# Patient Record
Sex: FEMALE | Race: BLACK OR AFRICAN AMERICAN | Employment: FULL TIME | URBAN - METROPOLITAN AREA
[De-identification: names, ages, dates, MRNs, and addresses within clinical notes are randomized per-mention and may not be internally consistent; named-entity substitution may affect disease eponyms.]

---

## 2017-02-27 ENCOUNTER — TELEPHONE (OUTPATIENT)
Dept: INTERNAL MEDICINE CLINIC | Age: 47
End: 2017-02-27

## 2017-02-27 DIAGNOSIS — S90.32XA CONTUSION OF LEFT FOOT, INITIAL ENCOUNTER: Primary | ICD-10-CM

## 2017-02-27 NOTE — TELEPHONE ENCOUNTER
Pt believes she has a broken toe and needs to know how to treat or should she be seen? Asking for call back today?

## 2017-02-27 NOTE — TELEPHONE ENCOUNTER
Called and spoke with pt, who stated she stubbed her 4th toe on left foot in her house and heard a crack. Pt advised to visit urgent care if pain becomes intolerable, it being close to office losing time and MD in a room with a pt; however she was also advised to elevate foot and apply ice for no more than 20 min at a time. Will relay info to Dr. Michael Bay and have pt call in the morning to update and see what tx/imaging  Dr. Michael Bay deems necessary.

## 2017-02-27 NOTE — TELEPHONE ENCOUNTER
Please advise patient an xray was ordered today. She can go to the office across the [place] for the xray.

## 2017-02-28 DIAGNOSIS — S90.122A CONTUSION OF FOOT INCLUDING TOES, LEFT, INITIAL ENCOUNTER: Primary | ICD-10-CM

## 2017-02-28 DIAGNOSIS — S90.32XA CONTUSION OF FOOT INCLUDING TOES, LEFT, INITIAL ENCOUNTER: Primary | ICD-10-CM

## 2017-02-28 NOTE — TELEPHONE ENCOUNTER
Called and spoke with patient informing an xray was ordered today. She can go to the office across the [place] for the xray per Dr. Jayson Klein; pt verbalized understanding and stated she will do this today.

## 2017-03-03 ENCOUNTER — OFFICE VISIT (OUTPATIENT)
Dept: INTERNAL MEDICINE CLINIC | Age: 47
End: 2017-03-03

## 2017-03-03 VITALS
OXYGEN SATURATION: 99 % | DIASTOLIC BLOOD PRESSURE: 60 MMHG | SYSTOLIC BLOOD PRESSURE: 102 MMHG | BODY MASS INDEX: 25.43 KG/M2 | HEIGHT: 65 IN | WEIGHT: 152.6 LBS | HEART RATE: 59 BPM | TEMPERATURE: 97.8 F

## 2017-03-03 DIAGNOSIS — M25.511 RIGHT SHOULDER PAIN, UNSPECIFIED CHRONICITY: Primary | ICD-10-CM

## 2017-03-03 NOTE — PROGRESS NOTES
HISTORY OF PRESENT ILLNESS  Radha Schmid is a 40 y.o. female. 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  ondansetron (ZOFRAN ODT) 4 mg disintegrating tablet Take 1 Tab by mouth every eight (8) hours as needed for Nausea. 10 Tab 0     No Known Allergies        ROS    Physical Exam   Constitutional: She appears well-developed and well-nourished. Appears stated age   Cardiovascular: Normal rate, regular rhythm, normal heart sounds and intact distal pulses. Exam reveals no gallop and no friction rub. No murmur heard. Pulmonary/Chest: Effort normal and breath sounds normal. No respiratory distress. She has no wheezes. She has no rales. She exhibits no tenderness. Abdominal: Soft. Bowel sounds are normal.   Musculoskeletal: She exhibits no edema. TTP right anterior shoulder. Pain with right shoulder abduction to 90 degress  Normal shoulder girdle strength b/l   Neurological: She is alert. Psychiatric: She has a normal mood and affect. Nursing note and vitals reviewed. ASSESSMENT and PLAN  Elin Hurst was seen today for shoulder pain. Diagnoses and all orders for this visit:    Right shoulder pain, unspecified chronicity  -     REFERRAL TO ORTHOPEDICS  -     REFERRAL TO ORTHOPEDIC SURGERY   C/w bursitis/tendonitis--take otc nsaids, rest ,ice   Needs to see ortho MD for 3 months of pain--r/o tear   Stop exercises until sees ortho MD      Follow-up Disposition:  Return if symptoms worsen or fail to improve.

## 2017-03-03 NOTE — MR AVS SNAPSHOT
Visit Information Date & Time Provider Department Dept. Phone Encounter #  
 3/3/2017 11:30 AM Fletcher Arroyo, [address] [facility] [address] [phone] [id number] Follow-up Instructions Return if symptoms worsen or fail to improve. Upcoming Health Maintenance Date Due DTaP/Tdap/Td series (1 - Tdap) 9/19/1992 INFLUENZA AGE 9 TO ADULT 8/1/2016 PAP AKA CERVICAL CYTOLOGY 5/18/2019 Allergies as of 3/3/2017  Review Complete On: 3/3/2017 By: Jacquelyn Arrington LPN No Known Allergies Current Immunizations  Never Reviewed No immunizations on file. Not reviewed this visit You Were Diagnosed With   
  
 Codes Comments Right shoulder pain, unspecified chronicity    -  Primary ICD-10-CM: M25.511 ICD-9-CM: 719.41 Vitals BP  
  
  
  
  
  
 102/60 (BP 1 Location: Left arm, BP Patient Position: Sitting) BMI and BSA Data Body Mass Index Body Surface Area  
 25.39 kg/m 2 1.78 m 2 Preferred Pharmacy Pharmacy Name Phone [facility] N [address]. [phone] Your Updated Medication List  
  
   
This list is accurate as of: 3/3/17 12:06 PM.  Always use your most recent med list.  
  
  
  
  
 ondansetron 4 mg disintegrating tablet Commonly known as:  ZOFRAN ODT Take 1 Tab by mouth every eight (8) hours as needed for Nausea. TOPICORT 0.25 % ointment Generic drug:  desoximetasone Apply  to affected area two (2) times a day. We Performed the Following REFERRAL TO ORTHOPEDIC SURGERY [REF62 Custom] Comments:  
 Please evaluate patient for shoulder pain REFERRAL TO ORTHOPEDICS [YBZ310 Custom] Comments:  
 Please evaluate patient for right shoulder injury Follow-up Instructions Return if symptoms worsen or fail to improve. Referral Information Referral ID Referred By Referred To  
  
 [id number] [place] FERRARA [address] [facility] [address] Phone: [phone] Visits Status Start Date End Date 1 New Request 3/3/17 3/3/18 If your referral has a status of pending review or denied, additional information will be sent to support the outcome of this decision. Referral ID Referred By Referred To  
 [id number] Nimesh FERR[facility] [id number], [address] [facility] Visits Status Start Date End Date 1 New Request 3/3/17 3/3/18 If your referral has a status of pending review or denied, additional information will be sent to support the outcome of this decision. Introducing [facility]! Dear [facility]: 
Thank you for requesting a Neiron account. Our records indicate that you already have an active Neiron account. You can access your account anytime at [email or web address] Did you know that you can access your hospital and ER discharge instructions at any time in Neiron? You can also review all of your test results from your hospital stay or ER visit. Additional Information If you have questions, please visit the Frequently Asked Questions section of the Neiron website at [email or web address] Remember, Neiron is NOT to be used for urgent needs. For medical emergencies, dial 911. Now available from your iPhone and Android! Please provide this summary of care documentation to your next provider. Your primary care clinician is listed as Gulshan Lr. If you have any questions after today's visit, please call [phone].

## 2017-03-13 ENCOUNTER — HOSPITAL ENCOUNTER (OUTPATIENT)
Dept: PHYSICAL THERAPY | Age: 47
Discharge: HOME | End: 2017-03-13
Payer: COMMERCIAL

## 2017-03-13 PROCEDURE — 97161 PT EVAL LOW COMPLEX 20 MIN: CPT | Performed by: PHYSICAL THERAPIST

## 2017-03-13 PROCEDURE — 97110 THERAPEUTIC EXERCISES: CPT | Performed by: PHYSICAL THERAPIST

## 2017-03-13 NOTE — PROGRESS NOTES
[facility]  [address] (MOB IV), [address] [facility] [address]  [place],  [address]  Phone: [phone] Fax: [phone]    Plan of Care/Statement of Necessity for Physical Therapy Services  2-15    Patient name: Binu Pinedo  :   Provider#: [id number]  Referral source: FRANCA Terrazas      Medical/Treatment Diagnosis: M25.511  M75.41     Prior Hospitalization: see medical history     Comorbidities: HA  Prior Level of Function: 20 min. Of exercise at least 3x/wk  Medications: Verified on Patient Summary List    Start of Care: 3/13/17      Onset Date: chronic       The Plan of Care and following information is based on the information from the initial evaluation. Assessment/ key information: The patient presents with right shoulder pain consistent with recent x-rays that were positive for a bone spur (per patient report) causing shoulder impingement with pushing and overhead activities. 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 She does have pain and weakness with rotator cuff activation and her impingement is exacerbated by tight pec musculature, causing scapular dyskinesia particularly with overhead movement. The patient will benefit from postural education, rotator cuff and scapular musculature strengthening in order to return to an active lifestyle. The patient has been thoroughly educated on no pushing or overhead activities for now, which she understood.     Evaluation Complexity History LOW Complexity : Zero comorbidities / personal factors that will impact the outcome / POC; Examination MEDIUM Complexity : 3 Standardized tests and measures addressing body structure, function, activity limitation and / or participation in recreation  ;Presentation LOW Complexity : Stable, uncomplicated  ;Clinical Decision Making MEDIUM Complexity : FOTO score of 26-74  Overall Complexity Rating: LOW     Problem List: pain affecting function, decrease ROM, decrease strength, edema affecting function, decrease ADL/ functional abilitiies, decrease activity tolerance, decrease flexibility/ joint mobility and decrease transfer abilities   Treatment Plan may include any combination of the following: Therapeutic exercise, Therapeutic activities, Neuromuscular re-education, Physical agent/modality, Manual therapy and Patient education  Patient / Family readiness to learn indicated by: asking questions, trying to perform skills and interest  Persons(s) to be included in education: patient (P)  Barriers to Learning/Limitations: None  Patient Goal (s): to get back to same level of physical activity  Patient Self Reported Health Status: excellent  Rehabilitation Potential: good    Short Term Goals: To be accomplished in 2 treatments:   1.) The patient will be independent with her HEP. Long Term Goals: To be accomplished in 16 treatments:   1.) The patient will have improved shoulder abduction to at least 150 degrees with at most 1/10 pain in order to reach overhead.   2.) The patient will be able to perform typical gym activities with at most 1/10 pain. 3.) The patient will be able to sleep an entire night without waking up from pain. Frequency / Duration: Patient to be seen 2 times per week for 16 treatments. Patient/ Caregiver education and instruction: self care, activity modification and exercises    [x]  Plan of care has been reviewed with RUSS Gleason, PT 3/13/2017 7:26 PM    ________________________________________________________________________    I certify that the above Therapy Services are being furnished while the patient is under my care. I agree with the treatment plan and certify that this therapy is necessary.     [de-identified] Signature:____________________  Date:____________Time: _________

## 2017-03-13 NOTE — PROGRESS NOTES
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 It also hurts running 3-4 miles. Recent X-rays show a bone spur. She mainly has difficulty doing pushups and repetitive overhead movement. She can't sleep on her right side. She had a cortisone shot Wednesday of last week and felt great until reaching for basketball. PLOF: very active; 20min.  Of exercise at least 3x/wk  Mechanism of Injury: chronic  Previous Treatment/Compliance: cortisone injection  PMHx/Surgical Hx: n/a  Work Hx: marketing  Living Situation: lives with family  Pt Goals: to get back to same level of activity  Barriers: pain  Motivation: good  Substance use: n/a   FABQ Score: 18/24  Cognition: A & O x 3        OBJECTIVE/EXAMINATION  Posture:  Scapular protraction bilaterally  Other Observations:  Significant pain with abduction versus flexion  Palpation: tender to palpate right pec and lat    A/PROM Shoulder ROM:  Right   Left   Flexion   160   160   Extension  nt   nt   Abduction  140, p!   155   Adduction  nt   nt   IR   Hand to t10, p!/85 t10/95   ER   Hand to nt/80  nt/95    Joint Mobility Assessment: Glenohumeral: hypomobile on right          Flexibility: tight right pec and lats    UPPER QUARTER   MUSCLE STRENGTH  KEY       R  L  0 - No Contraction   Flexion  4, p!  5  1 - Trace    Extension nt  nt  2 - Poor    Abduction 4, p!  5  3 - Fair     IR  4+  5  4 - Good    ER  4-, p!  5  5 - Normal       Neurological: Reflexes / Sensations: WFL  Special Tests: Neer Impingement: + on right  Helton-Rufino: + on right     Shoulder Abduction: +        30 min Therapeutic Exercise:  [x] See flow sheet :   Rationale: increase ROM, increase strength and improve coordination to improve the patients ability to perform daily activities.           With   [x] TE   [] TA   [] neuro   [] other: Patient Education: [x] Review HEP    [x] Progressed/Changed HEP based on:   [x] positioning   [x] body mechanics   [] transfers   [] heat/ice application    [] other:        Other Objective/Functional Measures: FOTO= 54    Pain Level (0-10 scale) post treatment: 3      ASSESSMENT:      [x]  See Plan of [address] Stephon Pettit, PT 3/13/2017  3:41 PM

## 2017-03-16 ENCOUNTER — HOSPITAL ENCOUNTER (OUTPATIENT)
Dept: PHYSICAL THERAPY | Age: 47
Discharge: HOME | End: 2017-03-16
Payer: COMMERCIAL

## 2017-03-16 PROCEDURE — 97014 ELECTRIC STIMULATION THERAPY: CPT | Performed by: PHYSICAL THERAPIST

## 2017-03-16 PROCEDURE — 97140 MANUAL THERAPY 1/> REGIONS: CPT | Performed by: PHYSICAL THERAPIST

## 2017-03-16 PROCEDURE — 97110 THERAPEUTIC EXERCISES: CPT | Performed by: PHYSICAL THERAPIST

## 2017-03-16 NOTE — PROGRESS NOTES
PT DAILY TREATMENT NOTE 2-15    Patient Name: Jordyn Cosat  WDEH:  : 1970  [x]  Patient  Verified  Payor: Rosalina Valientea / Plan: [id number] [employer] [place] HMO / Product Type: HMO /    In time:8:35am  Out time:9:35am  Total Treatment Time (min): 60  Visit #: 2     Treatment Area: [place] [address]    SUBJECTIVE  Pain Level (0-10 scale): 3  Any medication changes, allergies to medications, adverse drug reactions, diagnosis change, or new procedure performed?: [x] No    [] Yes (see summary sheet for update)  Subjective functional status/changes:   [] No changes reported  The patient reports that she has pain with one home exercise. OBJECTIVE    Modality rationale: decrease edema, decrease inflammation and decrease pain to improve the patients ability to perform daily activities. Min Type Additional Details   15 [x] Estim: []Att   [x]Unatt        []TENS instruct                  [x]IFC  []Premod   []NMES                     []Other:  []w/US   [x]w/ice   []w/heat  Position: supine  Location: shoulder    []  Traction: [] Cervical       []Lumbar                       [] Prone          []Supine                       []Intermittent   []Continuous Lbs:  [] before manual  [] after manual  []w/heat    []  Ultrasound: []Continuous   [] Pulsed at:                            []1MHz   []3MHz Location:  W/cm2:    []  Paraffin         Location:  []w/heat    []  Ice     []  Heat  []  Ice massage Position:  Location:    []  Laser  []  Other: Position:  Location:    []  Vasopneumatic Device Pressure:       [] lo [] med [] hi   Temperature:    [x] Skin assessment post-treatment:  [x]intact []redness- no adverse reaction    []redness - adverse reaction:     35 min Therapeutic Exercise:  [x] See flow sheet :   Rationale: increase ROM, increase strength and improve coordination to improve the patients ability to perform daily activities.     10 min Manual Therapy:  GH inferior mobs (grade 3/4); axillary passive release for subscap and lat; [address] oscillations and distraction; sidelying scapular mobs and subscap release   Rationale: decrease pain, increase ROM, increase tissue extensibility and decrease trigger points  to improve the patients ability to perform daily activities. With   [x] TE   [] TA   [] neuro   [] other: Patient Education: [x] Review HEP    [x] Progressed/Changed HEP based on:   [x] positioning   [x] body mechanics   [] transfers   [] heat/ice application    [] other:      Other Objective/Functional Measures: Pt. Had pain in more adducted position     Pain Level (0-10 scale) post treatment: 2    ASSESSMENT/Changes in Function:     The patient progressed with therex to tolerance today. Patient will continue to benefit from skilled PT services to modify and progress therapeutic interventions, address functional mobility deficits, address ROM deficits, address strength deficits, analyze and address soft tissue restrictions, analyze and cue movement patterns, analyze and modify body mechanics/ergonomics, assess and modify postural abnormalities, address imbalance/dizziness and instruct in home and community integration to attain remaining goals. [x]  See Plan of Care  []  See progress note/recertification  []  See Discharge Summary         Progress towards goals / Updated goals: The patient is progressing towards goals.     PLAN  [x]  Upgrade activities as tolerated     [x]  Continue plan of care  [x]  Update interventions per flow sheet       []  Discharge due to:_  []  Other:_      Gregory Henriquez PT 3/16/2017  8:37 AM

## 2017-03-20 ENCOUNTER — HOSPITAL ENCOUNTER (OUTPATIENT)
Dept: PHYSICAL THERAPY | Age: 47
Discharge: HOME | End: 2017-03-20
Payer: COMMERCIAL

## 2017-03-20 PROCEDURE — 97110 THERAPEUTIC EXERCISES: CPT | Performed by: PHYSICAL THERAPIST

## 2017-03-20 PROCEDURE — 97014 ELECTRIC STIMULATION THERAPY: CPT | Performed by: PHYSICAL THERAPIST

## 2017-03-20 NOTE — PROGRESS NOTES
PT DAILY TREATMENT NOTE 2-15    Patient Name: Jourdan Beebe  MSCA:[id number]  : 1970  [x]  Patient  Verified  Payor: Griselwilber Davonte / Plan: [id number] HMO / Product Type: HMO /    In time:10:00am  Out time:10:58am  Total Treatment Time (min): 62  Visit #: 3     Treatment Area: [place] [address]    SUBJECTIVE  Pain Level (0-10 scale): 2.5  Any medication changes, allergies to medications, adverse drug reactions, diagnosis change, or new procedure performed?: [x] No    [] Yes (see summary sheet for update)  Subjective functional status/changes:   [] No changes reported  The patient reports that it's now more of a constant dull ache, but she tried her exercises over the weekend. OBJECTIVE    Modality rationale: decrease edema, decrease inflammation and decrease pain to improve the patients ability to perform daily activities. Min Type Additional Details   15 [x] Estim: []Att   [x]Unatt        []TENS instruct                  [x]IFC  []Premod   []NMES                     []Other:  []w/US   [x]w/ice   []w/heat  Position: supine  Location: shoulder    []  Traction: [] Cervical       []Lumbar                       [] Prone          []Supine                       []Intermittent   []Continuous Lbs:  [] before manual  [] after manual  []w/heat    []  Ultrasound: []Continuous   [] Pulsed at:                            []1MHz   []3MHz Location:  W/cm2:    []  Paraffin         Location:  []w/heat    []  Ice     []  Heat  []  Ice massage Position:  Location:    []  Laser  []  Other: Position:  Location:    []  Vasopneumatic Device Pressure:       [] lo [] med [] hi   Temperature:    [x] Skin assessment post-treatment:  [x]intact []redness- no adverse reaction    []redness - adverse reaction:     40 min Therapeutic Exercise:  [x] See flow sheet :   Rationale: increase ROM, increase strength and improve coordination to improve the patients ability to perform daily activities.           With   [x] TE   [] TA   [] neuro   [] other: Patient Education: [x] Review HEP    [x] Progressed/Changed HEP based on:   [x] positioning   [x] body mechanics   [] transfers   [] heat/ice application    [] other:      Other Objective/Functional Measures: Pt. Tolerated new therex well. Pain Level (0-10 scale) post treatment: 1    ASSESSMENT/Changes in Function:     The patient is progressing with therex with limited discomfort. Patient will continue to benefit from skilled PT services to modify and progress therapeutic interventions, address functional mobility deficits, address ROM deficits, address strength deficits, analyze and address soft tissue restrictions, analyze and cue movement patterns, analyze and modify body mechanics/ergonomics, assess and modify postural abnormalities and instruct in home and community integration to attain remaining goals. [x]  See Plan of Care  []  See progress note/recertification  []  See Discharge Summary         Progress towards goals / Updated goals: The patient is slowly progressing towards goals.     PLAN  [x]  Upgrade activities as tolerated     [x]  Continue plan of care  [x]  Update interventions per flow sheet       []  Discharge due to:_  []  Other:_      Maryana Morse, PT 3/20/2017  10:05 AM

## 2017-03-23 ENCOUNTER — HOSPITAL ENCOUNTER (OUTPATIENT)
Dept: PHYSICAL THERAPY | Age: 47
Discharge: HOME | End: 2017-03-23
Payer: COMMERCIAL

## 2017-03-23 PROCEDURE — 97014 ELECTRIC STIMULATION THERAPY: CPT | Performed by: PHYSICAL THERAPIST

## 2017-03-23 PROCEDURE — 97110 THERAPEUTIC EXERCISES: CPT | Performed by: PHYSICAL THERAPIST

## 2017-03-23 NOTE — PROGRESS NOTES
PT DAILY TREATMENT NOTE 2-15    Patient Name: Li Chahal  RAIN:[id number]  : 1970  [x]  Patient  Verified  Payor: Richelle Pickett / Plan: [employer] HMO / Product Type: HMO /    In time:8:32am  Out time:9:32am  Total Treatment Time (min): 60  Visit #: 4     Treatment Area: [place] [address].    SUBJECTIVE  Pain Level (0-10 scale): 2  Any medication changes, allergies to medications, adverse drug reactions, diagnosis change, or new procedure performed?: [x] No    [] Yes (see summary sheet for update)  Subjective functional status/changes:   [] No changes reported  The patient reports that she tried to catch a falling tupperware and really flared up her shoulder and needed to take ibuprofen. OBJECTIVE    Modality rationale: decrease edema, decrease inflammation and decrease pain to improve the patients ability to perform daily activities. Min Type Additional Details   15 [x] Estim: []Att   [x]Unatt        []TENS instruct                  [x]IFC  []Premod   []NMES                     []Other:  []w/US   [x]w/ice   []w/heat  Position: supine  Location: shoulder    []  Traction: [] Cervical       []Lumbar                       [] Prone          []Supine                       []Intermittent   []Continuous Lbs:  [] before manual  [] after manual  []w/heat    []  Ultrasound: []Continuous   [] Pulsed at:                            []1MHz   []3MHz Location:  W/cm2:    []  Paraffin         Location:  []w/heat    []  Ice     []  Heat  []  Ice massage Position:  Location:    []  Laser  []  Other: Position:  Location:    []  Vasopneumatic Device Pressure:       [] lo [] med [] hi   Temperature:    [x] Skin assessment post-treatment:  [x]intact []redness- no adverse reaction    []redness - adverse reaction:     40 min Therapeutic Exercise:  [x] See flow sheet :   Rationale: increase ROM, increase strength and improve coordination to improve the patients ability to perform daily activities.           With   [x] TE   [] TA [] neuro   [] other: Patient Education: [x] Review HEP    [x] Progressed/Changed HEP based on:   [x] positioning   [x] body mechanics   [] transfers   [] heat/ice application    [] other:      Other Objective/Functional Measures: Pt. Had decreased pain with palm down. Pain Level (0-10 scale) post treatment: 2    ASSESSMENT/Changes in Function:     The patient progressed with tolerance to new therex and scapular stabilization. Patient will continue to benefit from skilled PT services to modify and progress therapeutic interventions, address functional mobility deficits, address ROM deficits, address strength deficits, analyze and address soft tissue restrictions, analyze and cue movement patterns, analyze and modify body mechanics/ergonomics, assess and modify postural abnormalities, address imbalance/dizziness and instruct in home and community integration to attain remaining goals. [x]  See Plan of Care  []  See progress note/recertification  []  See Discharge Summary         Progress towards goals / Updated goals: The patient is progressing slowly towards goals.     PLAN  [x]  Upgrade activities as tolerated     [x]  Continue plan of care  [x]  Update interventions per flow sheet       []  Discharge due to:_  []  Other:_      Sal Armas, PT 3/23/2017  8:40 AM

## 2017-03-27 ENCOUNTER — HOSPITAL ENCOUNTER (OUTPATIENT)
Dept: PHYSICAL THERAPY | Age: 47
Discharge: HOME | End: 2017-03-27
Payer: COMMERCIAL

## 2017-03-27 PROCEDURE — 97110 THERAPEUTIC EXERCISES: CPT | Performed by: PHYSICAL THERAPIST

## 2017-03-27 NOTE — PROGRESS NOTES
PT DAILY TREATMENT NOTE 2-15    Patient Name: Silvia Garces  HIUQ:  : 1970  [x]  Patient  Verified  Payor: Ruben Mcdonough / Plan: [address] [place] HMO / Product Type: HMO /    In time:10:06am  Out time:10:53am  Total Treatment Time (min): 47  Visit #: 5     Treatment Area: [address]    SUBJECTIVE  Pain Level (0-10 scale): 2  Any medication changes, allergies to medications, adverse drug reactions, diagnosis change, or new procedure performed?: [x] No    [] Yes (see summary sheet for update)  Subjective functional status/changes:   [] No changes reported  The patient reports that she had a really good day Saturday, but hurt again  and is now hyper aware of her posture. OBJECTIVE      45 min Therapeutic Exercise:  [x] See flow sheet :   Rationale: increase ROM, increase strength and improve coordination to improve the patients ability to perform daily activities. With   [x] TE   [] TA   [] neuro   [] other: Patient Education: [x] Review HEP    [x] Progressed/Changed HEP based on:   [x] positioning   [x] body mechanics   [] transfers   [] heat/ice application    [] other:      Other Objective/Functional Measures: The patient had improved scapular control     Pain Level (0-10 scale) post treatment: 1    ASSESSMENT/Changes in Function:     The patient progressed with increased resistance with therex to tolerance today. Patient will continue to benefit from skilled PT services to modify and progress therapeutic interventions, address functional mobility deficits, address ROM deficits, address strength deficits, analyze and address soft tissue restrictions, analyze and cue movement patterns, analyze and modify body mechanics/ergonomics, assess and modify postural abnormalities and instruct in home and community integration to attain remaining goals. [x]  See Plan of Care  []  See progress note/recertification  []  See Discharge Summary         Progress towards goals / Updated goals:   The patient is progressing towards goals.     PLAN  [x]  Upgrade activities as tolerated     [x]  Continue plan of care  [x]  Update interventions per flow sheet       []  Discharge due to:_  []  Other:_      Arthur Crowe PT 3/27/2017  10:19 AM

## 2017-03-30 ENCOUNTER — HOSPITAL ENCOUNTER (OUTPATIENT)
Dept: PHYSICAL THERAPY | Age: 47
Discharge: HOME | End: 2017-03-30
Payer: COMMERCIAL

## 2017-03-30 PROCEDURE — 97110 THERAPEUTIC EXERCISES: CPT | Performed by: PHYSICAL THERAPIST

## 2017-03-30 PROCEDURE — 97140 MANUAL THERAPY 1/> REGIONS: CPT | Performed by: PHYSICAL THERAPIST

## 2017-03-30 PROCEDURE — 97014 ELECTRIC STIMULATION THERAPY: CPT | Performed by: PHYSICAL THERAPIST

## 2017-03-30 NOTE — PROGRESS NOTES
PT DAILY TREATMENT NOTE 2-15    Patient Name: Simeon Barillas  [facility]:  : 1970  [x]  Patient  Verified  Payor: Emiliana Kevks / Plan: [address] [place] HMO / Product Type: HMO /    In time:8:33am  Out time:9:35am  Total Treatment Time (min): 62  Visit #: 6     Treatment Area: [place] [address]    SUBJECTIVE  Pain Level (0-10 scale): 2  Any medication changes, allergies to medications, adverse drug reactions, diagnosis change, or new procedure performed?: [x] No    [] Yes (see summary sheet for update)  Subjective functional status/changes:   [] No changes reported  The patient reports that she is really frusted as she's been achey since Monday and has had a migraine since then. OBJECTIVE    Modality rationale: decrease edema, decrease inflammation and decrease pain to improve the patients ability to perform daily activities. Min Type Additional Details   15 [x] Estim: []Att   [x]Unatt        []TENS instruct                  [x]IFC  []Premod   []NMES                     []Other:  []w/US   [x]w/ice   []w/heat  Position: supine  Location: shoulder    []  Traction: [] Cervical       []Lumbar                       [] Prone          []Supine                       []Intermittent   []Continuous Lbs:  [] before manual  [] after manual  []w/heat    []  Ultrasound: []Continuous   [] Pulsed at:                            []1MHz   []3MHz Location:  W/cm2:    []  Paraffin         Location:  []w/heat    []  Ice     []  Heat  []  Ice massage Position:  Location:    []  Laser  []  Other: Position:  Location:    []  Vasopneumatic Device Pressure:       [] lo [] med [] hi   Temperature:    [x] Skin assessment post-treatment:  [x]intact []redness- no adverse reaction    []redness - adverse reaction:     20 min Therapeutic Exercise:  [x] See flow sheet :   Rationale: increase ROM, increase strength and improve coordination to improve the patients ability to perform daily activities.     15 min Manual Therapy:  STM to UTs/SCMs/right levator scap and scalenes   Rationale: decrease pain, increase ROM, increase tissue extensibility and decrease trigger points  to improve the patients ability to perform daily activities. With   [x] TE   [] TA   [] neuro   [] other: Patient Education: [x] Review HEP    [x] Progressed/Changed HEP based on:   [x] positioning   [x] body mechanics   [] transfers   [] heat/ice application    [] other:      Other Objective/Functional Measures: Pt.'s exercises were modified to tolerance today. Pain Level (0-10 scale) post treatment: 1    ASSESSMENT/Changes in Function:     The patient is progressing slowly with therapy as she has significantly tight lat musculature on her right side and responded well after stretching, along with improved symptoms from her migraine after manual techniques. Patient will continue to benefit from skilled PT services to modify and progress therapeutic interventions, address functional mobility deficits, address ROM deficits, address strength deficits, analyze and address soft tissue restrictions, analyze and cue movement patterns, analyze and modify body mechanics/ergonomics, assess and modify postural abnormalities, address imbalance/dizziness and instruct in home and community integration to attain remaining goals. [x]  See Plan of Care  []  See progress note/recertification  []  See Discharge Summary         Progress towards goals / Updated goals: The patient is progressing towards goals.     PLAN  [x]  Upgrade activities as tolerated     [x]  Continue plan of care  [x]  Update interventions per flow sheet       []  Discharge due to:_  []  Other:_      Jannette Silva, PT 3/30/2017  8:43 AM

## 2017-04-03 ENCOUNTER — HOSPITAL ENCOUNTER (OUTPATIENT)
Dept: PHYSICAL THERAPY | Age: 47
Discharge: HOME | End: 2017-04-03
Payer: COMMERCIAL

## 2017-04-03 PROCEDURE — 97110 THERAPEUTIC EXERCISES: CPT | Performed by: PHYSICAL THERAPIST

## 2017-04-03 PROCEDURE — 97014 ELECTRIC STIMULATION THERAPY: CPT | Performed by: PHYSICAL THERAPIST

## 2017-04-03 PROCEDURE — 97140 MANUAL THERAPY 1/> REGIONS: CPT | Performed by: PHYSICAL THERAPIST

## 2017-04-03 NOTE — PROGRESS NOTES
PT DAILY TREATMENT NOTE 2-15    Patient Name: Tiana Jarquin  RQJF:[id number]  : 1970  [x]  Patient  Verified  Payor: [place] Herberthlizzie / Plan: [address] [place] HMO / Product Type: HMO /    In time:1:39pm  Out time:2:45pm  Total Treatment Time (min): 66  Visit #: 7     Treatment Area: [address]    SUBJECTIVE  Pain Level (0-10 scale): 2  Any medication changes, allergies to medications, adverse drug reactions, diagnosis change, or new procedure performed?: [x] No    [] Yes (see summary sheet for update)  Subjective functional status/changes:   [] No changes reported  The patient reports that she had some sharp pains over the past day and has been extra achy, especially in the right pec under the collar bone. OBJECTIVE    Modality rationale: decrease edema, decrease inflammation and decrease pain to improve the patients ability to perform daily activities. Min Type Additional Details   10 [x] Estim: []Att   [x]Unatt        []TENS instruct                  [x]IFC  []Premod   []NMES                     []Other:  []w/US   [x]w/ice   []w/heat  Position: supine  Location: shoulder    []  Traction: [] Cervical       []Lumbar                       [] Prone          []Supine                       []Intermittent   []Continuous Lbs:  [] before manual  [] after manual  []w/heat    []  Ultrasound: []Continuous   [] Pulsed at:                            []1MHz   []3MHz Location:  W/cm2:    []  Paraffin         Location:  []w/heat    []  Ice     []  Heat  []  Ice massage Position:  Location:    []  Laser  []  Other: Position:  Location:    []  Vasopneumatic Device Pressure:       [] lo [] med [] hi   Temperature:    [x] Skin assessment post-treatment:  [x]intact []redness- no adverse reaction    []redness - adverse reaction:     35 min Therapeutic Exercise:  [x] See flow sheet :   Rationale: increase ROM, increase strength and improve coordination to improve the patients ability to perform daily activities.     10 min Manual Therapy:  Seated right 1st rib mobs; STM to right UT in sitting; TP release to right anterior scalene with active release   Rationale: decrease pain, increase ROM, increase tissue extensibility and decrease trigger points  to improve the patients ability to perform daily activities. With   [x] TE   [] TA   [] neuro   [] other: Patient Education: [x] Review HEP    [x] Progressed/Changed HEP based on:   [x] positioning   [x] body mechanics   [] transfers   [] heat/ice application    [] other:      Other Objective/Functional Measures: Pt. Tolerated increased resistance well     Pain Level (0-10 scale) post treatment: 1    ASSESSMENT/Changes in Function:     The patient progressed with tolerance to strengthening exercises but had noticeable right anterior scalene tightness. Patient will continue to benefit from skilled PT services to modify and progress therapeutic interventions, address functional mobility deficits, address ROM deficits, address strength deficits, analyze and address soft tissue restrictions, analyze and cue movement patterns, analyze and modify body mechanics/ergonomics, assess and modify postural abnormalities and instruct in home and community integration to attain remaining goals. [x]  See Plan of Care  []  See progress note/recertification  []  See Discharge Summary         Progress towards goals / Updated goals: The patient is progressing towards goals.     PLAN  [x]  Upgrade activities as tolerated     [x]  Continue plan of care  [x]  Update interventions per flow sheet       []  Discharge due to:_  []  Other:_      Beatriz Espinal, PT 4/3/2017  2:34 PM

## 2017-04-06 ENCOUNTER — APPOINTMENT (OUTPATIENT)
Dept: PHYSICAL THERAPY | Age: 47
End: 2017-04-06
Payer: COMMERCIAL

## 2017-04-07 ENCOUNTER — HOSPITAL ENCOUNTER (OUTPATIENT)
Dept: PHYSICAL THERAPY | Age: 47
Discharge: HOME | End: 2017-04-07
Payer: COMMERCIAL

## 2017-04-07 PROCEDURE — 97110 THERAPEUTIC EXERCISES: CPT | Performed by: PHYSICAL THERAPIST

## 2017-04-07 PROCEDURE — 97140 MANUAL THERAPY 1/> REGIONS: CPT | Performed by: PHYSICAL THERAPIST

## 2017-04-07 NOTE — PROGRESS NOTES
PT DAILY TREATMENT NOTE 2-15    Patient Name: Dori Alaniz  [id number]  : 1970  [x]  Patient  Verified  Payor: Kaitlynnbob Armando / Plan: [address] [employer] / Product Type: HMO /    In time:1:05pm  Out time: 2:01pm  Total Treatment Time (min): 64  Visit #: 8     Treatment Area: [address]    SUBJECTIVE  Pain Level (0-10 scale): 0  Any medication changes, allergies to medications, adverse drug reactions, diagnosis change, or new procedure performed?: [x] No    [] Yes (see summary sheet for update)  Subjective functional status/changes:   [] No changes reported  The patient reports that she's been doing much better since last visit, just some pec discomfort and occasional lateral shoulder pain with certain/quick movements. OBJECTIVE    45 min Therapeutic Exercise:  [x] See flow sheet :   Rationale: increase ROM, increase strength and improve coordination to improve the patients ability to perform daily activities. 10 min Manual Therapy:  1st rib mob in sitting; STM to right upper trap; active right pec release by SC joint    Rationale: decrease pain, increase ROM, increase tissue extensibility and decrease trigger points  to improve the patients ability to perform daily activities. With   [x] TE   [] TA   [] neuro   [] other: Patient Education: [x] Review HEP    [x] Progressed/Changed HEP based on:   [x] positioning   [x] body mechanics   [] transfers   [] heat/ice application    [] other:      Other Objective/Functional Measures: Pt. Had pec tenderness today     Pain Level (0-10 scale) post treatment: 0    ASSESSMENT/Changes in Function:     The patient progressed with improved tolerance to therex and scapular strengthening today.  Patient will continue to benefit from skilled PT services to modify and progress therapeutic interventions, address functional mobility deficits, address ROM deficits, address strength deficits, analyze and address soft tissue restrictions, analyze and cue movement patterns, analyze and modify body mechanics/ergonomics, assess and modify postural abnormalities, address imbalance/dizziness and instruct in home and community integration to attain remaining goals. [x]  See Plan of Care  []  See progress note/recertification  []  See Discharge Summary         Progress towards goals / Updated goals: The patient is progressing towards goals.     PLAN  [x]  Upgrade activities as tolerated     [x]  Continue plan of care  [x]  Update interventions per flow sheet       []  Discharge due to:_  []  Other:_      Jaqui Carvajal, PT 4/7/2017  1:21 PM

## 2017-04-10 ENCOUNTER — APPOINTMENT (OUTPATIENT)
Dept: PHYSICAL THERAPY | Age: 47
End: 2017-04-10
Payer: COMMERCIAL

## 2017-04-13 ENCOUNTER — APPOINTMENT (OUTPATIENT)
Dept: PHYSICAL THERAPY | Age: 47
End: 2017-04-13
Payer: COMMERCIAL

## 2017-04-17 ENCOUNTER — APPOINTMENT (OUTPATIENT)
Dept: PHYSICAL THERAPY | Age: 47
End: 2017-04-17
Payer: COMMERCIAL

## 2017-04-20 ENCOUNTER — HOSPITAL ENCOUNTER (OUTPATIENT)
Dept: PHYSICAL THERAPY | Age: 47
Discharge: HOME | End: 2017-04-20
Payer: COMMERCIAL

## 2017-04-20 PROCEDURE — 97140 MANUAL THERAPY 1/> REGIONS: CPT | Performed by: PHYSICAL THERAPIST

## 2017-04-20 PROCEDURE — 97110 THERAPEUTIC EXERCISES: CPT | Performed by: PHYSICAL THERAPIST

## 2017-04-20 NOTE — PROGRESS NOTES
PT DAILY TREATMENT NOTE 2-15    Patient Name: Jes Varela  YUMN:[id number]  : 1970  [x]  Patient  Verified  Payor: Amanda Zimmerman / Plan: [address] [employer] [place] HMO / Product Type: HMO /    In time:9:35am  Out time:10:49am  Total Treatment Time (min): 74  Visit #: 10     Treatment Area: Impingement syndrome of right shoulder [M75.41]  Pain in right shoulder [M25.511]    SUBJECTIVE  Pain Level (0-10 scale): 1  Any medication changes, allergies to medications, adverse drug reactions, diagnosis change, or new procedure performed?: [x] No    [] Yes (see summary sheet for update)  Subjective functional status/changes:   [] No changes reported  The patient reports that she's still getting a \"poke\" right under to clavicle with certain movements, and she's waking up with shoulder discomfort due to positioning. OBJECTIVE    55 min Therapeutic Exercise:  [x] See flow sheet :   Rationale: increase ROM, increase strength and improve coordination to improve the patients ability to perform daily activities. 10 min Manual Therapy:  Inferior right clavicle mobs grade 3/4 during shoulder elevation in supine; 1st rib mobs grade 3/4 on right in sitting   Rationale: decrease pain, increase ROM, increase tissue extensibility and decrease trigger points  to improve the patients ability to perform daily activities. With   [x] TE   [] TA   [] neuro   [] other: Patient Education: [x] Review HEP    [x] Progressed/Changed HEP based on:   [x] positioning   [x] body mechanics   [] transfers   [] heat/ice application    [] other:      Other Objective/Functional Measures: Pt.  Has pain proximal clavicle pain with shoulder ER, resisted adduction with bent elbow, and bilateral tricep press in sitting      Pain Level (0-10 scale) post treatment: 1 (muscle soreness)    ASSESSMENT/Changes in Function:     The patient is progressing with increased strength, ROM, and tolerance to exercises, but has pain that is probable of right pec minor/pec major involvement. Patient will continue to benefit from skilled PT services to modify and progress therapeutic interventions, address functional mobility deficits, address ROM deficits, address strength deficits, analyze and address soft tissue restrictions, analyze and cue movement patterns, analyze and modify body mechanics/ergonomics, assess and modify postural abnormalities, address imbalance/dizziness and instruct in home and community integration to attain remaining goals. [x]  See Plan of Care  []  See progress note/recertification  []  See Discharge Summary         Progress towards goals / Updated goals: The patient is progressing towards goals.     PLAN  [x]  Upgrade activities as tolerated     [x]  Continue plan of care  [x]  Update interventions per flow sheet       []  Discharge due to:_  []  Other:_      Rachelle Stinson, PT 4/20/2017  11:26 AM

## 2017-04-24 ENCOUNTER — HOSPITAL ENCOUNTER (OUTPATIENT)
Dept: PHYSICAL THERAPY | Age: 47
Discharge: HOME | End: 2017-04-24
Payer: COMMERCIAL

## 2017-04-24 PROCEDURE — 97140 MANUAL THERAPY 1/> REGIONS: CPT | Performed by: PHYSICAL THERAPIST

## 2017-04-24 PROCEDURE — 97110 THERAPEUTIC EXERCISES: CPT | Performed by: PHYSICAL THERAPIST

## 2017-04-24 NOTE — PROGRESS NOTES
PT DAILY TREATMENT NOTE 2-15    Patient Name: Angela Moore  UKRO:  : 1970  [x]  Patient  Verified  Payor: Nate Champion / Plan: [employer] HMO / Product Type: HMO /    In time:12:05pm  Out time:1:00pm  Total Treatment Time (min): 55  Visit #: 11     Treatment Area: Impingement syndrome of right shoulder [M75.41]  Pain in right shoulder [M25.511]    SUBJECTIVE  Pain Level (0-10 scale): 1  Any medication changes, allergies to medications, adverse drug reactions, diagnosis change, or new procedure performed?: [x] No    [] Yes (see summary sheet for update)  Subjective functional status/changes:   [] No changes reported  The patient reports that she felt okay over the weekend, but she had to catch a falling object quickly and felt a lot of shoulder pain. OBJECTIVE    45 min Therapeutic Exercise:  [x] See flow sheet :   Rationale: increase ROM, increase strength and improve coordination to improve the patients ability to perform daily activities. 10 min Manual Therapy: Inferior right clavicle mobs grade 3/4 during shoulder elevation in supine; 1st rib mobs grade 3/4 on right in sitting             With   [x] TE   [] TA   [] neuro   [] other: Patient Education: [x] Review HEP    [x] Progressed/Changed HEP based on:   [x] positioning   [x] body mechanics   [] transfers   [] heat/ice application    [] other:      Other Objective/Functional Measures: Pt. Had discomfort with serratus punches today     Pain Level (0-10 scale) post treatment: 1    ASSESSMENT/Changes in Function:     The patient is progressing with tolerance to increased resistance but continues to have sub-clavicle and pec discomfort.  Patient will continue to benefit from skilled PT services to modify and progress therapeutic interventions, address functional mobility deficits, address ROM deficits, address strength deficits, analyze and address soft tissue restrictions, analyze and cue movement patterns, analyze and modify body mechanics/ergonomics, assess and modify postural abnormalities, address imbalance/dizziness and instruct in home and community integration to attain remaining goals. [x]  See Plan of Care  []  See progress note/recertification  []  See Discharge Summary         Progress towards goals / Updated goals: The patient is progressing towards goals.     PLAN  [x]  Upgrade activities as tolerated     [x]  Continue plan of care  [x]  Update interventions per flow sheet       []  Discharge due to:_  []  Other:_      Armida Degree, PT 4/24/2017  2:25 PM

## 2017-05-03 ENCOUNTER — HOSPITAL ENCOUNTER (OUTPATIENT)
Dept: PHYSICAL THERAPY | Age: 47
Discharge: HOME | End: 2017-05-03
Payer: COMMERCIAL

## 2017-05-03 PROCEDURE — 97110 THERAPEUTIC EXERCISES: CPT | Performed by: PHYSICAL THERAPIST

## 2017-05-03 PROCEDURE — 97140 MANUAL THERAPY 1/> REGIONS: CPT | Performed by: PHYSICAL THERAPIST

## 2017-05-03 NOTE — PROGRESS NOTES
PT DAILY TREATMENT NOTE 2-15    Patient Name: Celestino Reyes  OVZN:[id number]  : 1970  [x]  Patient  Verified  Payor: Sweetie Mota / Plan: [address] [employer] [place] HMO / Product Type: HMO /    In time:11:00am  Out time: 11:58am  Total Treatment Time (min): 58  Visit #: 12     Treatment Area: Impingement syndrome of right shoulder [M75.41]  Pain in right shoulder [M25.511]    SUBJECTIVE  Pain Level (0-10 scale): 0.25  Any medication changes, allergies to medications, adverse drug reactions, diagnosis change, or new procedure performed?: [x] No    [] Yes (see summary sheet for update)  Subjective functional status/changes:   [] No changes reported  The patient reports that it's doing better, she was able to play basketball with her son with minimal discomfort. OBJECTIVE    55 min Therapeutic Exercise:  [x] See flow sheet :   Rationale: increase ROM, increase strength and improve coordination to improve the patients ability to perform daily activities. With   [x] TE   [] TA   [] neuro   [] other: Patient Education: [x] Review HEP    [x] Progressed/Changed HEP based on:   [x] positioning   [x] body mechanics   [] transfers   [] heat/ice application    [] other:      Other Objective/Functional Measures: Patient only had pec pain with SWB T's, but not Y's     Pain Level (0-10 scale) post treatment: 1    ASSESSMENT/Changes in Function:     The patient progressed with overhead strengthening therex to tolerance and continues to progress with dynamic exercises. 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  Patient will continue to benefit from skilled PT services to modify and progress therapeutic interventions, address functional mobility deficits, address ROM deficits, address strength deficits, analyze and address soft tissue restrictions, analyze and cue movement patterns, analyze and modify body mechanics/ergonomics, assess and modify postural abnormalities, address imbalance/dizziness and instruct in home and community integration to attain remaining goals. [x]  See Plan of Care  []  See progress note/recertification  []  See Discharge Summary         Progress towards goals / Updated goals: The patient is progressing towards goals.     PLAN  [x]  Upgrade activities as tolerated     [x]  Continue plan of care  [x]  Update interventions per flow sheet       []  Discharge due to:_  []  Other:_      Thania Ceron, PT 5/3/2017  11:25 AM

## 2017-05-11 ENCOUNTER — HOSPITAL ENCOUNTER (OUTPATIENT)
Dept: PHYSICAL THERAPY | Age: 47
Discharge: HOME | End: 2017-05-11
Payer: COMMERCIAL

## 2017-05-11 PROCEDURE — 97140 MANUAL THERAPY 1/> REGIONS: CPT | Performed by: PHYSICAL THERAPIST

## 2017-05-11 PROCEDURE — 97110 THERAPEUTIC EXERCISES: CPT | Performed by: PHYSICAL THERAPIST

## 2017-05-11 NOTE — PROGRESS NOTES
[facility]  [address] [place] [address] (MOB IV), [address] [facility] [address]  [place], [address]  Phone: [phone] Fax: [phone]    Discharge Summary  2-15    Patient name: Dori Alaniz  :   Provider#: [id number]  Referral source: FRANCA Bundy      Medical/Treatment Diagnosis: Impingement syndrome of right shoulder [M75.41]  Pain in right shoulder [M25.511]     Prior Hospitalization: see medical history     Comorbidities: See Plan of Care  Prior Level of Function:See Plan of Care  Medications: Verified on Patient Summary List    Start of Care: 3/13/17      Onset Date:chronic   Visits from Start of Care: 13     Missed Visits: 0  Reporting Period : 3/13/17 to 17      ASSESSMENT/SUMMARY OF CARE: The patient initially presented with right rotator cuff impingement from a known subacromial bone spur that 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 She now has full pain free ROM (abduction was limited to 140 degrees with pain at the initial evaluation but now is 165 degrees). She only occasionally has anterior proximal pec discomfort inferior to the clavicle near the [place] joint which is aggravated with over stretching the pecs with shoulder external rotation and shoulder depression in the form of a tricep dip exercise. She is very knowledgeable of particular exercises and muscle to continue to focus on strengthening and has performed dynamic shoulder stability exercises without discomfort. 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 Short Term Goals: To be accomplished in 2 treatments:  1.) The patient will be independent with her HEP.- MET  Long Term Goals:  To be accomplished in 16 treatments:  1.) The patient will have improved shoulder abduction to at least 150 degrees with at most 1/10 pain in order to reach overhead. - MET  2.) The patient will be able to perform typical gym activities with at most 1/10 pain. - MET  3.) The patient will be able to sleep an entire night without waking up from pain. - MET      RECOMMENDATIONS:  [x]Discontinue therapy: [x]Patient has reached or is progressing toward set goals      []Patient is non-compliant or has abdicated      []Due to lack of appreciable progress towards set goals      []Other    Millie Goodman, PT 5/11/2017 11:29 AM

## 2017-05-11 NOTE — PROGRESS NOTES
PT DAILY TREATMENT NOTE 2-15    Patient Name: Tiburcio Nicole  UYYX:  : 1970  [x]  Patient  Verified  Payor: Arsh Gavinain / Plan: [id number] / Product Type: HMO /    In time:9:35am  Out time:10:30am  Total Treatment Time (min): 55  Visit #: 13     Treatment Area: Impingement syndrome of right shoulder [M75.41]  Pain in right shoulder [M25.511]    SUBJECTIVE  Pain Level (0-10 scale): 0  Any medication changes, allergies to medications, adverse drug reactions, diagnosis change, or new procedure performed?: [x] No    [] Yes (see summary sheet for update)  Subjective functional status/changes:   [] No changes reported  The patient reports that she's had a long week of activity, including running 5 miles, performing a workout with a friend, and gardening for 8 hours, and her shoulder feels fine. OBJECTIVE    45 min Therapeutic Exercise:  [x] See flow sheet :   Rationale: increase ROM, increase strength and improve coordination to improve the patients ability to perform daily activities. 10 min Manual Therapy:  Grade 3/4 inferior clavicle mobs with shoulder flexion in supine; grade 3/4 right 1st rib mob in sitting   Rationale: decrease pain, increase ROM and increase tissue extensibility  to improve the patients ability to perform daily activities. With   [x] TE   [] TA   [] neuro   [] other: Patient Education: [x] Review HEP    [x] Progressed/Changed HEP based on:   [x] positioning   [x] body mechanics   [] transfers   [] heat/ice application    [] other:      Other Objective/Functional Measures:  FOTO= 84 (54 at eval)     Pain Level (0-10 scale) post treatment: 0    ASSESSMENT/Changes in Function:     Patient has progressed very well and MET goals and will be discharged today. []  See Plan of Care  []  See progress note/recertification  [x]  See Discharge Summary         Progress towards goals / Updated goals: The patient has MET goals and will be discharged today.     PLAN  [] Upgrade activities as tolerated     []  Continue plan of care  []  Update interventions per flow sheet       [x]  Discharge due to: Pt. Has MET goals.   []  Other:_      [facility], PT 5/11/2017  11:01 AM

## 2017-05-18 ENCOUNTER — APPOINTMENT (OUTPATIENT)
Dept: PHYSICAL THERAPY | Age: 47
End: 2017-05-18
Payer: COMMERCIAL

## 2017-05-25 ENCOUNTER — OFFICE VISIT (OUTPATIENT)
Dept: INTERNAL MEDICINE CLINIC | Age: 47
End: 2017-05-25

## 2017-05-25 VITALS
RESPIRATION RATE: 16 BRPM | OXYGEN SATURATION: 98 % | SYSTOLIC BLOOD PRESSURE: 113 MMHG | WEIGHT: 151.4 LBS | BODY MASS INDEX: 25.22 KG/M2 | DIASTOLIC BLOOD PRESSURE: 72 MMHG | TEMPERATURE: 98 F | HEART RATE: 57 BPM | HEIGHT: 65 IN

## 2017-05-25 DIAGNOSIS — Z00.00 ROUTINE GENERAL MEDICAL EXAMINATION AT A HEALTH CARE FACILITY: Primary | ICD-10-CM

## 2017-05-25 NOTE — PROGRESS NOTES
HISTORY OF PRESENT ILLNESS  Miriam Alvarez is a 40 y.o. female. HPI   Pt here need forms for adoption competed--[facility] services of [facility], [address] [place] [address] well mentally and physically  Right shoulder pain has improved with PT  Has  and one son  No f/c cough , night sweats or weight loss    Patient Active Problem List    Diagnosis Date Noted    Insomnia 11/13/2015    Migraine 11/13/2015     Current Outpatient Prescriptions   Medication Sig Dispense Refill    desoximetasone (TOPICORT) 0.25 % ointment Apply  to affected area two (2) times a day.  ondansetron (ZOFRAN ODT) 4 mg disintegrating tablet Take 1 Tab by mouth every eight (8) hours as needed for Nausea.  10 Tab 0     No Known Allergies  Past Medical History:   Diagnosis Date    Headache     migraine     Past Surgical History:   Procedure Laterality Date    HX ORTHOPAEDIC      ganglion cyst removal     Family History   Problem Relation Age of Onset    Stroke Paternal Grandmother     Alzheimer Paternal Grandmother     Cancer Paternal Grandfather      lung    Hypertension Mother     Glaucoma Mother     Cancer Father     Hypertension Brother     Cancer Paternal Uncle      pancreatic     Social History   Substance Use Topics    Smoking status: Never Smoker    Smokeless tobacco: Never Used    Alcohol use 0.0 oz/week     5 - 6 Glasses of wine per week      Lab Results  Component Value Date/Time   WBC 8.6 10/23/2016 03:26 PM   HGB 12.7 10/23/2016 03:26 PM   HCT 38.4 10/23/2016 03:26 PM   PLATELET 221 28/13/9562 03:26 PM   MCV 85.0 10/23/2016 03:26 PM       Lab Results  Component Value Date/Time   GFR est AA >60 10/23/2016 03:26 PM   GFR est non-AA >60 10/23/2016 03:26 PM   Creatinine 0.70 10/23/2016 03:26 PM   BUN 9 10/23/2016 03:26 PM   Sodium 139 10/23/2016 03:26 PM   Potassium 3.4 10/23/2016 03:26 PM   Chloride 102 10/23/2016 03:26 PM   CO2 28 10/23/2016 03:26 PM         Review of Systems   Constitutional: Negative for chills, fever, malaise/fatigue and weight loss. Eyes: Negative for blurred vision and double vision. Respiratory: Negative for cough and shortness of breath. Cardiovascular: Negative for chest pain and palpitations. Gastrointestinal: Negative for abdominal pain, blood in stool, constipation, diarrhea, melena, nausea and vomiting. Genitourinary: Negative for dysuria, frequency, hematuria and urgency. Musculoskeletal: Negative for back pain, falls, joint pain and myalgias. Neurological: Negative for dizziness, tremors and headaches. Physical Exam   Constitutional: She appears well-developed and well-nourished. Appears stated age   HENT:   Mouth/Throat: Oropharynx is clear and moist.   Eyes: Pupils are equal, round, and reactive to light. Neck: Normal range of motion. Neck supple. No JVD present. No tracheal deviation present. No thyromegaly present. Cardiovascular: Normal rate, regular rhythm, normal heart sounds and intact distal pulses. Exam reveals no gallop and no friction rub. No murmur heard. Pulmonary/Chest: Effort normal and breath sounds normal. No respiratory distress. She has no wheezes. Abdominal: Soft. Bowel sounds are normal. She exhibits no distension and no mass. There is no tenderness. There is no rebound and no guarding. Musculoskeletal: She exhibits no edema. Lymphadenopathy:     She has no cervical adenopathy. Neurological: She is alert. Psychiatric: She has a normal mood and affect. Nursing note and vitals reviewed. ASSESSMENT and PLAN    CPE   Completed adoption physical forms---fit to adopt a child   TB skin test not needed  There are no diagnoses linked to this encounter.   Follow-up Disposition: Not on File

## 2017-05-25 NOTE — MR AVS SNAPSHOT
Visit Information Date & Time Provider Department Dept. Phone Encounter #  
 5/25/2017 11:00 AM Valentino Lozano, [address] [place] [address],[address] [phone] [id number] Upcoming Health Maintenance Date Due DTaP/Tdap/Td series (1 - Tdap) 9/19/1992 INFLUENZA AGE 9 TO ADULT 8/1/2017 PAP AKA CERVICAL CYTOLOGY 5/18/2019 Allergies as of 5/25/2017  Review Complete On: 5/25/2017 By: Maria M Castano No Known Allergies Current Immunizations  Never Reviewed No immunizations on file. Not reviewed this visit Vitals BP Pulse Temp Resp Height(growth percentile) Weight(growth percentile) 113/72 (BP 1 Location: Left arm, BP Patient Position: Sitting) (!) 57 98 °F (36.7 °C) (Oral) 16 5' 5\" (1.651 m) 151 lb 6.4 oz (68.7 kg) LMP SpO2 BMI OB Status Smoking Status 03/15/2017 (Approximate) 98% 25.19 kg/m2 Having regular periods Never Smoker Vitals History BMI and BSA Data Body Mass Index Body Surface Area  
 25.19 kg/m 2 1.78 m 2 Preferred Pharmacy Pharmacy Name Phone [facility] [address], [facility]. [phone] Your Updated Medication List  
  
   
This list is accurate as of: 5/25/17 11:35 AM.  Always use your most recent med list.  
  
  
  
  
 ondansetron 4 mg disintegrating tablet Commonly known as:  ZOFRAN ODT Take 1 Tab by mouth every eight (8) hours as needed for Nausea. TOPICORT 0.25 % ointment Generic drug:  desoximetasone Apply  to affected area two (2) times a day. Introducing [facility]! Dear Jessica Matias: 
Thank you for requesting a [employer]el[employer] account. Our records indicate that you already have an active Cinelan account. You can access your account anytime at [email or web address] Did you know that you can access your hospital and ER discharge instructions at any time in Cinelan?   You can also review all of your test results from your hospital stay or ER visit. Additional Information If you have questions, please visit the Frequently Asked Questions section of the LensAR website at [email or web address] Remember, LensAR is NOT to be used for urgent needs. For medical emergencies, dial 911. Now available from your iPhone and Android! Please provide this summary of care documentation to your next provider. Your primary care clinician is listed as Emiliano Faye. If you have any questions after today's visit, please call [phone].

## 2017-06-23 NOTE — TELEPHONE ENCOUNTER
Patient states she needs refill done thru [facility]. Please call if any questions.  Thank you

## 2017-06-23 NOTE — TELEPHONE ENCOUNTER
I have not seen this patient in over a year and have not prescribed this medication for the patient in the past.

## 2017-06-25 ENCOUNTER — TELEPHONE (OUTPATIENT)
Dept: INTERNAL MEDICINE CLINIC | Age: 47
End: 2017-06-25

## 2017-06-25 NOTE — TELEPHONE ENCOUNTER
Returned page Saturday June 24 at 15:00. Pt needed imitrex. Had called in end of week, and was still waiting for refill to be called in to [phone][phone]  rx called in, sumatriptan 50 mg, #30.

## 2017-06-26 NOTE — TELEPHONE ENCOUNTER
Identified patient 2 identifiers verified. Patient said  That this was a transfer script  When she moved from [place].

## 2017-06-28 RX ORDER — SUMATRIPTAN 50 MG/1
TABLET, FILM COATED ORAL
Qty: 15 TAB | Refills: 0 | Status: SHIPPED | OUTPATIENT
Start: 2017-06-28 | End: 2018-04-10 | Stop reason: SDUPTHER

## 2018-04-10 RX ORDER — SUMATRIPTAN 50 MG/1
TABLET, FILM COATED ORAL
Qty: 9 TAB | Refills: 0 | Status: SHIPPED | OUTPATIENT
Start: 2018-04-10 | End: 2018-08-14

## 2018-08-14 ENCOUNTER — TELEPHONE (OUTPATIENT)
Dept: INTERNAL MEDICINE CLINIC | Age: 48
End: 2018-08-14

## 2018-08-14 ENCOUNTER — OFFICE VISIT (OUTPATIENT)
Dept: URGENT CARE | Age: 48
End: 2018-08-14

## 2018-08-14 VITALS
OXYGEN SATURATION: 100 % | BODY MASS INDEX: 26.49 KG/M2 | HEART RATE: 50 BPM | DIASTOLIC BLOOD PRESSURE: 85 MMHG | WEIGHT: 159 LBS | TEMPERATURE: 97.2 F | SYSTOLIC BLOOD PRESSURE: 151 MMHG | RESPIRATION RATE: 16 BRPM | HEIGHT: 65 IN

## 2018-08-14 DIAGNOSIS — M26.621 ARTHRALGIA OF RIGHT TEMPOROMANDIBULAR JOINT: Primary | ICD-10-CM

## 2018-08-14 RX ORDER — CYCLOBENZAPRINE HCL 10 MG
10 TABLET ORAL
Qty: 30 TAB | Refills: 0 | Status: SHIPPED | OUTPATIENT
Start: 2018-08-14 | End: 2019-03-13 | Stop reason: ALTCHOICE

## 2018-08-14 NOTE — PATIENT INSTRUCTIONS
Temporomandibular Disorder: Care Instructions  Your Care Instructions    Temporomandibular (TM) disorders are a problem with the muscles and joints that connect your jaw to your skull. They cause pain when you open your mouth, chew, or yawn. You may feel this pain on one or both sides. TM disorders are often caused by tight jaw muscles. The tightness can be caused by clenching or grinding your teeth. This may happen when you have a lot of stress in your life. If you lower your stress, you may be able to stop clenching or grinding your teeth. This will help relax your jaw and reduce your pain. You may also be able to do some things at home to feel better. But if none of this works, your doctor may prescribe medicine to help relax your muscles and control the pain. Follow-up care is a key part of your treatment and safety. Be sure to make and go to all appointments, and call your doctor if you are having problems. It's also a good idea to know your test results and keep a list of the medicines you take. How can you care for yourself at home? · Put a warm, moist cloth or heating pad set on low on your jaw. Do this for 10 to 20 minutes at a time. Put a thin cloth between the heating pad and your skin. · Avoid hard or chewy foods that cause your jaws to work very hard. Examples include popcorn, jerky, tough meats, chewy breads, gum, and raw apples and carrots. · Choose softer foods that are easy to chew. These include eggs, yogurt, and soup. · Cut your food into small pieces. Chew slowly. · If your jaw gets too painful to chew, or if it locks, you may need to puree your food for a few days or weeks. · To relax your jaw, repeat this exercise for a few minutes every morning and evening. Watch yourself in a mirror. Gently open and close your mouth. Move your jaw straight up and down. But don't do this if it makes your pain worse.   · Get at least 30 minutes of exercise on most days of the week to relieve stress. Walking is a good choice. You also may want to do other activities, such as running, swimming, cycling, or playing tennis or team sports. · Do not:  ¨ Hold a phone between your shoulder and your jaw. ¨ Open your mouth all the way, like when you sing loudly or yawn. ¨ Clench or grind your teeth, bite your lips, or chew your fingernails. ¨ Clench things such as pens, pipes, or cigars between your teeth. When should you call for help? Call your doctor now or seek immediate medical care if:    · Your jaw is locked open or shut or it is hard to move your jaw.    Watch closely for changes in your health, and be sure to contact your doctor if:    · Your jaw pain gets worse.     · Your face is swollen.     · You do not get better as expected. Where can you learn more? Go to http://kelsey-amy.info/. Enter O529 in the search box to learn more about \"Temporomandibular Disorder: Care Instructions. \"  Current as of: May 12, 2017  Content Version: 11.7  © 5894-8978 [employer]. Care instructions adapted under license by [employer] (which disclaims liability or warranty for this information). If you have questions about a medical condition or this instruction, always ask your healthcare professional. [address] any warranty or liability for your use of this information.

## 2018-08-14 NOTE — TELEPHONE ENCOUNTER
Call completed to patient, two identifiers verified. Patient advised there are no openings in the office today. Patient offered and declined an appointment for Wednesday, August 16, 2018. Patient plans to seek treatment at Urgent Care.

## 2018-08-14 NOTE — TELEPHONE ENCOUNTER
Dr. Ronny Prince patient last seen by Dr. Moe Castrejon on 5/25/17 states she needs a call back in reference to getting an appt today for Jaw Pain. Please call.  Thank you

## 2018-08-14 NOTE — PROGRESS NOTES
Patient is a 56 y.o. female presenting with jaw pain. The history is provided by the patient. Jaw Pain   Episode onset: 8 months ago, right TMJ pain; has seen dentist who rx'ed prednisone without improvement; wears . The problem occurs constantly. The problem has been gradually worsening. Pertinent negatives include no chest pain and no shortness of breath. Treatments tried: ibuprofen, has stopped taking for a few days due to abdominal discomfort. The treatment provided no relief. Past Medical History:   Diagnosis Date    Headache(784.0)     migraine        Past Surgical History:   Procedure Laterality Date    HX ORTHOPAEDIC      ganglion cyst removal         Family History   Problem Relation Age of Onset    Stroke Paternal Grandmother     Alzheimer Paternal Grandmother     Cancer Paternal Grandfather      lung    Hypertension Mother     Glaucoma Mother     Cancer Father     Hypertension Brother     Cancer Paternal Uncle      pancreatic        Social History     Social History    Marital status:      Spouse name: N/A    Number of children: N/A    Years of education: N/A     Occupational History    Not on file. Social History Main Topics    Smoking status: Never Smoker    Smokeless tobacco: Never Used    Alcohol use 0.0 oz/week     5 - 6 Glasses of wine per week    Drug use: No    Sexual activity: Yes     Partners: Male     Birth control/ protection: Rhythm     Other Topics Concern    Not on file     Social History Narrative                ALLERGIES: Review of patient's allergies indicates no known allergies. Review of Systems   Constitutional: Negative for chills and fever. HENT: Negative for congestion, rhinorrhea, sinus pain, sinus pressure and sore throat. Respiratory: Negative for shortness of breath and wheezing. Cardiovascular: Negative for chest pain and palpitations. Musculoskeletal: Negative for myalgias. Skin: Negative for rash. Hematological: Negative for adenopathy. Vitals:    08/14/18 1137   BP: 151/85   Pulse: (!) 50   Resp: 16   Temp: 97.2 °F (36.2 °C)   SpO2: 100%   Weight: 159 lb (72.1 kg)   Height: 5' 5\" (1.651 m)       Physical Exam   Constitutional: She appears well-developed and well-nourished. No distress. HENT:   Head:       Mouth/Throat: Oropharynx is clear and moist and mucous membranes are normal. No oral lesions. No dental abscesses. No oropharyngeal exudate, posterior oropharyngeal edema, posterior oropharyngeal erythema or tonsillar abscesses. Neurological: She is alert. Skin: She is not diaphoretic. Psychiatric: She has a normal mood and affect. Her behavior is normal. Judgment and thought content normal.   Nursing note and vitals reviewed. MDM    ICD-10-CM ICD-9-CM    1. Arthralgia of right temporomandibular joint M26.621 524.62      Medications Ordered Today   Medications    cyclobenzaprine (FLEXERIL) 10 mg tablet     Sig: Take 1 Tab by mouth nightly as needed for Muscle Spasm(s). Dispense:  30 Tab     Refill:  0     TMJ exercises given  Take Aleve in 1 week prn    The patients condition was discussed with the patient and they understand. The patient is to follow up with PCP. If signs and symptoms become worse the pt is to go to the ER. The patient is to take medications as prescribed.              Procedures

## 2018-08-14 NOTE — MR AVS SNAPSHOT
[address] [place] [address] 
[phone] Patient: July Goldsmith MRN: [id number] NPR:[id number] Visit Information Date & Time Provider Department Dept. Phone Encounter #  
 8/14/2018 10:45 AM [address] [facility] [phone] [id number] Upcoming Health Maintenance Date Due DTaP/Tdap/Td series (1 - Tdap) 9/19/1992 Influenza Age 5 to Adult 8/1/2018 PAP AKA CERVICAL CYTOLOGY 5/18/2019 Allergies as of 8/14/2018  Review Complete On: 8/14/2018 By: Mira Polanco MD  
 No Known Allergies Current Immunizations  Never Reviewed No immunizations on file. Not reviewed this visit You Were Diagnosed With   
  
 Codes Comments Arthralgia of right temporomandibular joint    -  Primary ICD-10-CM: I55.755 ICD-9-CM: 524.62 Vitals BP Pulse Temp Resp Height(growth percentile) Weight(growth percentile) 151/85 (!) 50 97.2 °F (36.2 °C) 16 5' 5\" (1.651 m) 159 lb (72.1 kg) LMP SpO2 BMI OB Status Smoking Status 08/13/2018 100% 26.46 kg/m2 Having regular periods Never Smoker BMI and BSA Data Body Mass Index Body Surface Area  
 26.46 kg/m 2 1.82 m 2 Preferred Pharmacy Pharmacy Name Phone [address] Jimenez, [address] [place] [address]. [phone] Your Updated Medication List  
  
   
This list is accurate as of 8/14/18 12:03 PM.  Always use your most recent med list.  
  
  
  
  
 cyclobenzaprine 10 mg tablet Commonly known as:  FLEXERIL Take 1 Tab by mouth nightly as needed for Muscle Spasm(s). Prescriptions Sent to Pharmacy Refills  
 cyclobenzaprine (FLEXERIL) 10 mg tablet 0 Sig: Take 1 Tab by mouth nightly as needed for Muscle Spasm(s). Class: Normal  
 Pharmacy: [address] Jimenez, [address] [place] [address] RD. Ph #: [phone] Route: Oral  
  
Patient Instructions Temporomandibular Disorder: Care Instructions Your Care Instructions Temporomandibular (TM) disorders are a problem with the muscles and joints that connect your jaw to your skull. They cause pain when you open your mouth, chew, or yawn. You may feel this pain on one or both sides. TM disorders are often caused by tight jaw muscles. The tightness can be caused by clenching or grinding your teeth. This may happen when you have a lot of stress in your life. If you lower your stress, you may be able to stop clenching or grinding your teeth. This will help relax your jaw and reduce your pain. You may also be able to do some things at home to feel better. But if none of this works, your doctor may prescribe medicine to help relax your muscles and control the pain. Follow-up care is a key part of your treatment and safety. Be sure to make and go to all appointments, and call your doctor if you are having problems. It's also a good idea to know your test results and keep a list of the medicines you take. How can you care for yourself at home? · Put a warm, moist cloth or heating pad set on low on your jaw. Do this for 10 to 20 minutes at a time. Put a thin cloth between the heating pad and your skin. · Avoid hard or chewy foods that cause your jaws to work very hard. Examples include popcorn, jerky, tough meats, chewy breads, gum, and raw apples and carrots. · Choose softer foods that are easy to chew. These include eggs, yogurt, and soup. · Cut your food into small pieces. Chew slowly. · If your jaw gets too painful to chew, or if it locks, you may need to puree your food for a few days or weeks. · To relax your jaw, repeat this exercise for a few minutes every morning and evening. Watch yourself in a mirror. Gently open and close your mouth. Move your jaw straight up and down. But don't do this if it makes your pain worse.  
· Get at least 30 minutes of exercise on most days of the week to relieve stress. Walking is a good choice. You also may want to do other activities, such as running, swimming, cycling, or playing tennis or team sports. · Do not: 
¨ Hold a phone between your shoulder and your jaw. ¨ Open your mouth all the way, like when you sing loudly or yawn. ¨ Clench or grind your teeth, bite your lips, or chew your fingernails. ¨ Clench things such as pens, pipes, or cigars between your teeth. When should you call for help? Call your doctor now or seek immediate medical care if: 
  · Your jaw is locked open or shut or it is hard to move your jaw.  
 Watch closely for changes in your health, and be sure to contact your doctor if: 
  · Your jaw pain gets worse.  
  · Your face is swollen.  
  · You do not get better as expected. Where can you learn more? Go to http://kelsey-amy.info/. Enter O038 in the search box to learn more about \"Temporomandibular Disorder: Care Instructions. \" Current as of: May 12, 2017 Content Version: 11.7 © 4961-7880 ShopItToMe. Care instructions adapted under license by [employer] (which disclaims liability or warranty for this information). If you have questions about a medical condition or this instruction, always ask your healthcare professional. [address] any warranty or liability for your use of this information. Introducing [facility]! Dear Yassine Child: 
Thank you for requesting a [employer] account. Our records indicate that you already have an active [employer] account. You can access your account anytime at [email or web address] [email or web address]/Malcovery Security Did you know that you can access your hospital and ER discharge instructions at any time in [employer]? You can also review all of your test results from your hospital stay or ER visit. Additional Information If you have questions, please visit the Frequently Asked Questions section of the ScaleXtreme website at [email or web address] Remember, ScaleXtreme is NOT to be used for urgent needs. For medical emergencies, dial 911. Now available from your iPhone and Android! Please provide this summary of care documentation to your next provider. Your primary care clinician is listed as Noel Older. If you have any questions after today's visit, please call [phone].

## 2018-09-18 ENCOUNTER — TELEPHONE (OUTPATIENT)
Dept: INTERNAL MEDICINE CLINIC | Age: 48
End: 2018-09-18

## 2018-09-18 NOTE — TELEPHONE ENCOUNTER
Identified patient 2 identifiers verified.  Patient scheduled an appointment with Dr. Almazan Record on 9/24/18 at 2:15pm

## 2018-09-18 NOTE — TELEPHONE ENCOUNTER
#[phone] pt has had spells of nausea, vomiting and migraines. Needs to be seen as soon as possible. Pt not seen in over a year.   Please call

## 2018-09-24 ENCOUNTER — OFFICE VISIT (OUTPATIENT)
Dept: INTERNAL MEDICINE CLINIC | Age: 48
End: 2018-09-24

## 2018-09-24 VITALS
BODY MASS INDEX: 27.32 KG/M2 | OXYGEN SATURATION: 100 % | SYSTOLIC BLOOD PRESSURE: 125 MMHG | TEMPERATURE: 98 F | RESPIRATION RATE: 18 BRPM | WEIGHT: 164 LBS | DIASTOLIC BLOOD PRESSURE: 78 MMHG | HEIGHT: 65 IN | HEART RATE: 59 BPM

## 2018-09-24 DIAGNOSIS — Z12.31 ENCOUNTER FOR SCREENING MAMMOGRAM FOR BREAST CANCER: ICD-10-CM

## 2018-09-24 DIAGNOSIS — G43.909 MIGRAINE WITHOUT STATUS MIGRAINOSUS, NOT INTRACTABLE, UNSPECIFIED MIGRAINE TYPE: Primary | ICD-10-CM

## 2018-09-24 DIAGNOSIS — R11.2 NAUSEA AND VOMITING, INTRACTABILITY OF VOMITING NOT SPECIFIED, UNSPECIFIED VOMITING TYPE: ICD-10-CM

## 2018-09-24 RX ORDER — ONDANSETRON 4 MG/1
4 TABLET, ORALLY DISINTEGRATING ORAL
Qty: 15 TAB | Refills: 0 | Status: SHIPPED | OUTPATIENT
Start: 2018-09-24 | End: 2019-06-26 | Stop reason: SDUPTHER

## 2018-09-24 RX ORDER — SUMATRIPTAN 50 MG/1
TABLET, FILM COATED ORAL
Qty: 9 TAB | Refills: 1 | Status: SHIPPED | OUTPATIENT
Start: 2018-09-24 | End: 2019-02-22 | Stop reason: SDUPTHER

## 2018-09-24 NOTE — PROGRESS NOTES
SUBJECTIVE:  
Ms. Ivana Galvan is a 53 y.o. female who is here c/o migraines, nausea, vomiting, and mild dizziness. Pt notes a hx of migraines, typically with 3-4/year, but with recent increasing frequency (on 9/18/18, beginning of September, end of August, and one in July). Pt has not been able to associate any causes or patterns. Pt reports her appetite is moderate. Pt reports the latest episode (9/18/18) woke her up at night with occipital pain and feeling nauseous; pt reports she was vomiting throughout the day and took sumatriptan at night, and the HA went away. Pt notes this summer she tried an apple cider vinegar cleanse and experienced vomiting again. Pt also recalls a day of similar sx's after drinking two small glasses of wine. Pt reports she noticed a few years ago when she drank water from the water fountain at the [facility] airport she would experience the same sx's. Pt denies new supplements or vitamins. She notes she tried a new protein mix last week and vomited after, so she has not used it again. Pt denies any muscle pains, acute vision changes, CP, SOB, allergies, changes in her bowel movements, or association with her menstrual cycle. Pt is not using birth control and states she is not pregnant. She notes she has TMJ (R), but that is not a new development. Pt does not check her BP at home. Pt does not have zofran, but has used it in the past.  
 
PREVENTIVE: 
Mammogram: due, last 2 yrs ago Flu: declined At this time, she is otherwise doing well and has brought no other complaints to my attention today. PMH:  
Past Medical History:  
Diagnosis Date  Headache(784.0)   
 migraine  TMJ (dislocation of temporomandibular joint) PSH:  has a past surgical history that includes hx orthopaedic. All: has No Known Allergies. MEDS:  
Current Outpatient Prescriptions Medication Sig  
 ondansetron (ZOFRAN ODT) 4 mg disintegrating tablet Take 1 Tab by mouth every eight (8) hours as needed for Nausea.  SUMAtriptan (IMITREX) 50 mg tablet TAKE 1 TABLET BY MOUTH AT ONSET OF HEADACHE. MAY REPEAT ONCE AFTER 2 HOURS WITH MAX OF 100MG IN 24 HOURS  cyclobenzaprine (FLEXERIL) 10 mg tablet Take 1 Tab by mouth nightly as needed for Muscle Spasm(s). No current facility-administered medications for this visit. FH: family history includes Alzheimer in her paternal grandmother; Cancer in her father, paternal grandfather, and paternal uncle; Glaucoma in her mother; Hypertension in her brother and mother; Stroke in her paternal grandmother. SH:  reports that she has never smoked. She has never used smokeless tobacco. She reports that she drinks alcohol. She reports that she does not use illicit drugs. Review of Systems - History obtained from the patient General ROS: nausea, vomiting Psychological ROS: negative Ophthalmic ROS: negative ENT ROS: negative Respiratory ROS: no cough, shortness of breath, or wheezing Cardiovascular ROS: no chest pain or dyspnea on exertion Gastrointestinal ROS: no abdominal pain, change in bowel habits, or black or bloody stools Genito-Urinary ROS: negative Musculoskeletal ROS: negative Neurological ROS: migraines, dizziness Dermatological ROS: negative OBJECTIVE:  
Vitals:  
Visit Vitals  /78 (BP 1 Location: Left arm, BP Patient Position: Sitting)  Pulse (!) 59  Temp 98 °F (36.7 °C) (Oral)  Resp 18  Ht 5' 5\" (1.651 m)  Wt 164 lb (74.4 kg)  SpO2 100%  BMI 27.29 kg/m2 Gen: Pleasant 53 y.o.  female in NAD. HEENT: NC/AT. HEART: RRR, No M/G/R.  
LUNGS: CTAB No W/R. EXTREMITIES: Warm. No C/C/E. NEURO: Alert and oriented x 3. Cranial nerves grossly intact. No focal sensory or motor deficits noted. SKIN: Warm. Dry. No rashes or other lesions noted.  
 
ASSESSMENT/ PLAN:  
 
Diagnoses and all orders for this visit: 
 
 1. Migraine without status migrainosus, not intractable, unspecified migraine type -     SUMAtriptan (IMITREX) 50 mg tablet; TAKE 1 TABLET BY MOUTH AT ONSET OF HEADACHE. MAY REPEAT ONCE AFTER 2 HOURS WITH MAX OF 100MG  [facility] Neurology ref [id number]. Nausea and vomiting, intractability of vomiting not specified, unspecified vomiting type -     METABOLIC PANEL, COMPREHENSIVE 
-     CBC WITH AUTOMATED DIFF 
-     ondansetron (ZOFRAN ODT) 4 mg disintegrating tablet; Take 1 Tab by mouth every eight (8) hours as needed for Nausea. 3. Encounter for screening mammogram for breast cancer -     [facility] MAMMO BI SCREENING INCL CAD; Future 1. Migraine I refilled pt's sumatriptan. I referred pt to Dr. Lindsay Patel (neuro) for further assessment due to the increasing frequency. I advised pt to stick to her baseline diet, without processed food and low-fat, maintain good hydration levels, and avoid any new substances. If symptoms do not improve or worsen, pt may call back or return to office. 2. Nausea and vomiting I prescribed zofran to help reduce nausea. I ordered labs for CMP and CBC for further assessment. 3. Screening mammogram 
Pt is due for a mammogram (order given). Follow-up Disposition: 
Return if symptoms worsen or fail to improve. I have reviewed the patient's medications and risks/side effects/benefits were discussed. Diagnosis(-es) explained to patient and questions answered. Literature provided where appropriate.   
 
Written by Shayy Hernandez, as dictated by Ho De Souza MD.

## 2018-09-24 NOTE — MR AVS SNAPSHOT
[address] 
[phone] Patient: Mellissa Howell MRN: [id number] Visit Information Date & Time Provider Department Dept. Phone Encounter #  
 9/24/2018  2:15 PM Pura Cox, [address] [id number] Follow-up Instructions Return if symptoms worsen or fail to improve. Upcoming Health Maintenance Date Due DTaP/Tdap/Td series (1 - Tdap) 9/19/1992 Influenza Age 5 to Adult 9/24/2019* PAP AKA CERVICAL CYTOLOGY 5/18/2019 *Topic was postponed. The date shown is not the original due date. Allergies as of 9/24/2018  Review Complete On: 9/24/2018 By: Pura Cox MD  
 No Known Allergies Current Immunizations  Never Reviewed No immunizations on file. Not reviewed this visit You Were Diagnosed With   
  
 Codes Comments Migraine without status migrainosus, not intractable, unspecified migraine type    -  Primary ICD-10-CM: U29.816 ICD-9-CM: 346.90 Nausea and vomiting, intractability of vomiting not specified, unspecified vomiting type     ICD-10-CM: R11.2 ICD-9-CM: 787.01 Encounter for screening mammogram for breast cancer     ICD-10-CM: Z12.31 
ICD-9-CM: V76.12 Vitals BP Pulse Temp Resp Height(growth percentile) Weight(growth percentile) 125/78 (BP 1 Location: Left arm, BP Patient Position: Sitting) (!) 59 98 °F (36.7 °C) (Oral) 18 5' 5\" (1.651 m) 164 lb (74.4 kg) SpO2 BMI OB Status Smoking Status 100% 27.29 kg/m2 Having regular periods Never Smoker Vitals History BMI and BSA Data Body Mass Index Body Surface Area  
 27.29 kg/m 2 1.85 m 2 Preferred Pharmacy Pharmacy Name Phone [facility] Michael [address] [place] [address] Dr Jimenez, [address] [place] [address]. [phone] Your Updated Medication List  
  
   
 This list is accurate as of 9/24/18  2:45 PM.  Always use your most recent med list.  
  
  
  
  
 cyclobenzaprine 10 mg tablet Commonly known as:  FLEXERIL Take 1 Tab by mouth nightly as needed for Muscle Spasm(s). ondansetron 4 mg disintegrating tablet Commonly known as:  ZOFRAN ODT Take 1 Tab by mouth every eight (8) hours as needed for Nausea. SUMAtriptan 50 mg tablet Commonly known as:  IMITREX  
TAKE 1 TABLET BY MOUTH AT ONSET OF HEADACHE. MAY REPEAT ONCE AFTER 2 HOURS WITH MAX OF 100MG IN 24 HOURS Prescriptions Sent to Pharmacy Refills  
 ondansetron (ZOFRAN ODT) 4 mg disintegrating tablet 0 Sig: Take 1 Tab by mouth every eight (8) hours as needed for Nausea. Class: Normal  
 Pharmacy: [address] Dr Jimenez, [address] [place] [address] RD. Ph #: [phone] Route: Oral  
 SUMAtriptan (IMITREX) 50 mg tablet 1 Sig: TAKE 1 TABLET BY MOUTH AT ONSET OF HEADACHE. MAY REPEAT ONCE AFTER 2 HOURS WITH MAX OF 100MG IN 24 HOURS Class: Normal  
 Pharmacy: [address] Dr Jimenez, [address] [place] [address] RD. Ph #: [phone] We Performed the Following CBC WITH AUTOMATED DIFF [23589 CPT(R)] METABOLIC PANEL, COMPREHENSIVE [86171 CPT(R)] REFERRAL TO NEUROLOGY [IYN29 Custom] Follow-up Instructions Return if symptoms worsen or fail to improve. To-Do List   
 09/24/2018 Imaging:  [facility] MAMMO BI SCREENING INCL CAD Referral Information Referral ID Referred By Referred To  
  
 [id number] [address], [place]   
   [address] [place] [address] [address] [place] [address] Phone: [phone] Fax: [phone] Visits Status Start Date End Date 1 New Request 9/24/18 9/24/19 If your referral has a status of pending review or denied, additional information will be sent to support the outcome of this decision. Introducing [facility]!    
 Dear Tangela Yost: 
 Thank you for requesting a eWise account. Our records indicate that you already have an active eWise account. You can access your account anytime at [email or web address] Did you know that you can access your hospital and ER discharge instructions at any time in eWise? You can also review all of your test results from your hospital stay or ER visit. Additional Information If you have questions, please visit the Frequently Asked Questions section of the eWise website at [email or web address] Remember, eWise is NOT to be used for urgent needs. For medical emergencies, dial 911. Now available from your iPhone and Android! Please provide this summary of care documentation to your next provider. Your primary care clinician is listed as [facility]. If you have any questions after today's visit, please call [phone].

## 2018-09-25 LAB
ALBUMIN SERPL-MCNC: 4.3 G/DL (ref 3.5–5.5)
ALBUMIN/GLOB SERPL: 2 {RATIO} (ref 1.2–2.2)
ALP SERPL-CCNC: 58 IU/L (ref 39–117)
ALT SERPL-CCNC: 19 IU/L (ref 0–32)
AST SERPL-CCNC: 25 IU/L (ref 0–40)
BASOPHILS # BLD AUTO: 0 X10E3/UL (ref 0–0.2)
BASOPHILS NFR BLD AUTO: 1 %
BILIRUB SERPL-MCNC: 0.3 MG/DL (ref 0–1.2)
BUN SERPL-MCNC: 13 MG/DL (ref 6–24)
BUN/CREAT SERPL: 14 (ref 9–23)
CALCIUM SERPL-MCNC: 9.3 MG/DL (ref 8.7–10.2)
CHLORIDE SERPL-SCNC: 102 MMOL/L (ref 96–106)
CO2 SERPL-SCNC: 24 MMOL/L (ref 20–29)
CREAT SERPL-MCNC: 0.92 MG/DL (ref 0.57–1)
EOSINOPHIL # BLD AUTO: 0 X10E3/UL (ref 0–0.4)
EOSINOPHIL NFR BLD AUTO: 1 %
ERYTHROCYTE [DISTWIDTH] IN BLOOD BY AUTOMATED COUNT: 13.5 % (ref 12.3–15.4)
GLOBULIN SER CALC-MCNC: 2.1 G/DL (ref 1.5–4.5)
GLUCOSE SERPL-MCNC: 85 MG/DL (ref 65–99)
HCT VFR BLD AUTO: 38.4 % (ref 34–46.6)
HGB BLD-MCNC: 12.6 G/DL (ref 11.1–15.9)
IMM GRANULOCYTES # BLD: 0 X10E3/UL (ref 0–0.1)
IMM GRANULOCYTES NFR BLD: 0 %
LYMPHOCYTES # BLD AUTO: 1.5 X10E3/UL (ref 0.7–3.1)
LYMPHOCYTES NFR BLD AUTO: 28 %
MCH RBC QN AUTO: 27.5 PG (ref 26.6–33)
MCHC RBC AUTO-ENTMCNC: 32.8 G/DL (ref 31.5–35.7)
MCV RBC AUTO: 84 FL (ref 79–97)
MONOCYTES # BLD AUTO: 0.3 X10E3/UL (ref 0.1–0.9)
MONOCYTES NFR BLD AUTO: 6 %
NEUTROPHILS # BLD AUTO: 3.6 X10E3/UL (ref 1.4–7)
NEUTROPHILS NFR BLD AUTO: 64 %
PLATELET # BLD AUTO: 251 X10E3/UL (ref 150–379)
POTASSIUM SERPL-SCNC: 4.2 MMOL/L (ref 3.5–5.2)
PROT SERPL-MCNC: 6.4 G/DL (ref 6–8.5)
RBC # BLD AUTO: 4.58 X10E6/UL (ref 3.77–5.28)
SODIUM SERPL-SCNC: 139 MMOL/L (ref 134–144)
WBC # BLD AUTO: 5.5 X10E3/UL (ref 3.4–10.8)

## 2018-09-28 ENCOUNTER — HOSPITAL ENCOUNTER (OUTPATIENT)
Dept: MAMMOGRAPHY | Age: 48
Discharge: HOME | End: 2018-09-28
Attending: FAMILY MEDICINE
Payer: COMMERCIAL

## 2018-09-28 DIAGNOSIS — Z12.31 ENCOUNTER FOR SCREENING MAMMOGRAM FOR BREAST CANCER: ICD-10-CM

## 2018-09-28 PROCEDURE — 77067 SCR MAMMO BI INCL CAD: CPT

## 2018-10-16 ENCOUNTER — OFFICE VISIT (OUTPATIENT)
Dept: NEUROLOGY | Age: 48
End: 2018-10-16

## 2018-10-16 VITALS
SYSTOLIC BLOOD PRESSURE: 122 MMHG | OXYGEN SATURATION: 98 % | DIASTOLIC BLOOD PRESSURE: 90 MMHG | HEART RATE: 64 BPM | BODY MASS INDEX: 27.32 KG/M2 | WEIGHT: 164 LBS | HEIGHT: 65 IN

## 2018-10-16 DIAGNOSIS — G43.009 MIGRAINE WITHOUT AURA AND WITHOUT STATUS MIGRAINOSUS, NOT INTRACTABLE: Primary | ICD-10-CM

## 2018-10-16 RX ORDER — SUMATRIPTAN 20 MG/1
SPRAY NASAL
Qty: 6 CONTAINER | Refills: 2 | Status: SHIPPED | OUTPATIENT
Start: 2018-10-16 | End: 2019-03-13 | Stop reason: ALTCHOICE

## 2018-10-16 NOTE — LETTER
10/16/2018 9:51 AM 
 
Patient:    Neena Belcher YOB: 1970 Date of Visit:    10/16/2018 Dear Jamaica Delgado MD 
 
Thank you for referring Ms. Neena Belcher to me for evaluation/treatment. Below are the relevant portions of my assessment and plan of care. Neurology Note Chief Complaint Patient presents with  New Patient  
  migraines HPI/Subjective Neena Belcher is a 53 y.o. female who presented to the neurology office for Management of headaches. She has been having headaches since her teenage years and they were infrequent but since beginning of 2018 her headaches have worsened. She had the onset of headache around her right ear and retro-orbital and sometimes it can go to bilateral occipital region as well. It lasts throughout the day and sometimes it can even go into the next day. She has these episodes 2-3 times a month and the headaches are 10 out of 10 in severity. It is associated with nausea and vomiting as well. She has tried Imitrex but she throws up and does not get the medication in her system. She has recently been prescribed Zofran for the nausea. Risk Factors for headaches Smoking: Denies Coffee: 2-3 cups/day Tea: 0 cups/day Soda: 0 cans/day Water: More than 8 glasses/day Sleeps at 11 PM and wakes up at 6 AM. Medications tried Abortive therapy: 
Imitrex Preventative therapy None Current Outpatient Prescriptions Medication Sig  SUMAtriptan (IMITREX) 20 mg/actuation nasal spray 20 mg intranasal in either nostril at the onset of headache.  ondansetron (ZOFRAN ODT) 4 mg disintegrating tablet Take 1 Tab by mouth every eight (8) hours as needed for Nausea.  SUMAtriptan (IMITREX) 50 mg tablet TAKE 1 TABLET BY MOUTH AT ONSET OF HEADACHE. MAY REPEAT ONCE AFTER 2 HOURS WITH MAX OF 100MG IN 24 HOURS  cyclobenzaprine (FLEXERIL) 10 mg tablet Take 1 Tab by mouth nightly as needed for Muscle Spasm(s). No current facility-administered medications for this visit. No Known Allergies Past Medical History:  
Diagnosis Date  Headache   
 Headache(784.0)   
 migraine  Migraine 2018  TMJ (dislocation of temporomandibular joint) Past Surgical History:  
Procedure Laterality Date  HX CYST REMOVAL  2003  HX ORTHOPAEDIC    
 ganglion cyst removal  
 
Family History Problem Relation Age of Onset  Stroke Paternal Grandmother  Alzheimer Paternal Grandmother  Cancer Paternal Grandfather   
  lung  Hypertension Mother  Glaucoma Mother  Cancer Father  Hypertension Brother  Cancer Paternal Uncle   
  pancreatic Social History Substance Use Topics  Smoking status: Never Smoker  Smokeless tobacco: Never Used  Alcohol use 0.0 oz/week 5 - 6 Glasses of wine per week Comment: 1 times a week REVIEW OF SYSTEMS:  
A ten system review of constitutional, cardiovascular, respiratory, musculoskeletal, endocrine, skin, SHEENT, genitourinary, psychiatric and neurologic systems was obtained and is unremarkable with the exception of constipation, depression, frequent headache, joint pain, nausea and vomiting and stomach pain EXAMINATION:  
Visit Vitals  /90  Pulse 64  Ht 5' 5\" (1.651 m)  Wt 164 lb (74.4 kg)  SpO2 98%  BMI 27.29 kg/m2 General:  
General appearance: Pt is in no acute distress Distal pulses are preserved Fundoscopic Exam: Normal 
 
Neurological Examination:  
Mental Status: AAO x3. Speech is fluent. Follows commands, has normal fund of knowledge, attention, short term recall, comprehension and insight. Cranial Nerves: Visual fields are full. PERRL, Extraocular movements are full. Facial sensation intact. Facial movement intact. Hearing intact to conversation. Palate elevates symmetrically. Shoulder shrug symmetric. Tongue midline. Motor: Strength is 5/5 in all 4 ext. No atrophy.   
 
Tone: Normal 
 
 Sensation: Normal to light touch Reflexes: DTRs 2+ throughout. Coordination/Cerebellar: Intact to finger-nose-finger Gait: Casual gait is normal.  
 
Skin: No significant bruising or lacerations. Laboratory review:  
Results for orders placed or performed in visit on 09/24/18 METABOLIC PANEL, COMPREHENSIVE Result Value Ref Range Glucose 85 65 - 99 mg/dL BUN 13 6 - 24 mg/dL Creatinine 0.92 0.57 - 1.00 mg/dL GFR est non-AA 74 >59 mL/min/1.73 GFR est AA 86 >59 mL/min/1.73  
 BUN/Creatinine ratio 14 9 - 23 Sodium 139 134 - 144 mmol/L Potassium 4.2 3.5 - 5.2 mmol/L Chloride 102 96 - 106 mmol/L  
 CO2 24 20 - 29 mmol/L Calcium 9.3 8.7 - 10.2 mg/dL Protein, total 6.4 6.0 - 8.5 g/dL Albumin 4.3 3.5 - 5.5 g/dL GLOBULIN, TOTAL 2.1 1.5 - 4.5 g/dL A-G Ratio 2.0 1.2 - 2.2 Bilirubin, total 0.3 0.0 - 1.2 mg/dL Alk. phosphatase 58 39 - 117 IU/L  
 AST (SGOT) 25 0 - 40 IU/L  
 ALT (SGPT) 19 0 - 32 IU/L  
CBC WITH AUTOMATED DIFF Result Value Ref Range WBC 5.5 3.4 - 10.8 x10E3/uL  
 RBC 4.58 3.77 - 5.28 x10E6/uL HGB 12.6 11.1 - 15.9 g/dL HCT 38.4 34.0 - 46.6 % MCV 84 79 - 97 fL  
 MCH 27.5 26.6 - 33.0 pg  
 MCHC 32.8 31.5 - 35.7 g/dL  
 RDW 13.5 12.3 - 15.4 % PLATELET 795 476 - 129 x10E3/uL NEUTROPHILS 64 Not Estab. % Lymphocytes 28 Not Estab. % MONOCYTES 6 Not Estab. % EOSINOPHILS 1 Not Estab. % BASOPHILS 1 Not Estab. %  
 ABS. NEUTROPHILS 3.6 1.4 - 7.0 x10E3/uL Abs Lymphocytes 1.5 0.7 - 3.1 x10E3/uL  
 ABS. MONOCYTES 0.3 0.1 - 0.9 x10E3/uL  
 ABS. EOSINOPHILS 0.0 0.0 - 0.4 x10E3/uL  
 ABS. BASOPHILS 0.0 0.0 - 0.2 x10E3/uL IMMATURE GRANULOCYTES 0 Not Estab. %  
 ABS. IMM. GRANS. 0.0 0.0 - 0.1 x10E3/uL Imaging review: 
None Documentation review: 
None Assessment/Plan: Abdulkadir Rivera is a 53 y.o. female who presented to the neurology office for management of episodic migraines.   She is taking Imitrex oral but does have vomiting and throws up her medication as well. I am going to prescribe Imitrex intranasal spray. I also provided her Zomig intranasal spray to try. The patient has nonfocal examination and there is no need for imaging at this time. Follow-up in 2 months PHQ over the last two weeks 9/24/2018 Little interest or pleasure in doing things Not at all Feeling down, depressed, irritable, or hopeless Several days Total Score PHQ 2 1 Primary care to address possible depression if PHQ-9 score is more than 9. ICD-10-CM ICD-9-CM 1. Migraine without aura and without status migrainosus, not intractable G43.009 346.10 SUMAtriptan (IMITREX) 20 mg/actuation nasal spray Thank you for allowing me to participate in the care of Ms. Yusef Willis. Please feel free to contact me if you have any questions. Aliyah Samuels MD 
Neurologist 
 
CC: Fan Khan MD 
Fax: [phone] This note was created using voice recognition software. Despite editing, there may be syntax errors. If you have questions, please do not hesitate to call me. I look forward to following Ms. Yusef Willis along with you. Sincerely, Aliyah Samuels MD

## 2018-10-16 NOTE — PROGRESS NOTES
Neurology Note    Chief Complaint   Patient presents with    New Patient     migraines       HPI/Subjective  Adam Rain is a 53 y.o. female who presented to the neurology office for Management of headaches. She has been having headaches since her teenage years and they were infrequent but since beginning of 2018 her headaches have worsened. She had the onset of headache around her right ear and retro-orbital and sometimes it can go to bilateral occipital region as well. It lasts throughout the day and sometimes it can even go into the next day. She has these episodes 2-3 times a month and the headaches are 10 out of 10 in severity. It is associated with nausea and vomiting as well. She has tried Imitrex but she throws up and does not get the medication in her system. She has recently been prescribed Zofran for the nausea. Risk Factors for headaches  Smoking: Denies  Coffee: 2-3 cups/day  Tea: 0 cups/day  Soda: 0 cans/day  Water: More than 8 glasses/day  Sleeps at 11 PM and wakes up at 6 AM.     Medications tried  Abortive therapy:  Imitrex    Preventative therapy  None      Current Outpatient Prescriptions   Medication Sig    SUMAtriptan (IMITREX) 20 mg/actuation nasal spray 20 mg intranasal in either nostril at the onset of headache.  ondansetron (ZOFRAN ODT) 4 mg disintegrating tablet Take 1 Tab by mouth every eight (8) hours as needed for Nausea.  SUMAtriptan (IMITREX) 50 mg tablet TAKE 1 TABLET BY MOUTH AT ONSET OF HEADACHE. MAY REPEAT ONCE AFTER 2 HOURS WITH MAX OF 100MG IN 24 HOURS    cyclobenzaprine (FLEXERIL) 10 mg tablet Take 1 Tab by mouth nightly as needed for Muscle Spasm(s). No current facility-administered medications for this visit.       No Known Allergies  Past Medical History:   Diagnosis Date    Headache     Headache(784.0)     migraine    Migraine 2018    TMJ (dislocation of temporomandibular joint)      Past Surgical History:   Procedure Laterality Date    HX CYST REMOVAL  2003    HX ORTHOPAEDIC      ganglion cyst removal     Family History   Problem Relation Age of Onset    Stroke Paternal Grandmother     Alzheimer Paternal Grandmother     Cancer Paternal Grandfather      lung    Hypertension Mother     Glaucoma Mother     Cancer Father     Hypertension Brother     Cancer Paternal Uncle      pancreatic     Social History   Substance Use Topics    Smoking status: Never Smoker    Smokeless tobacco: Never Used    Alcohol use 0.0 oz/week     5 - 6 Glasses of wine per week      Comment: 1 times a week       REVIEW OF SYSTEMS:   A ten system review of constitutional, cardiovascular, respiratory, musculoskeletal, endocrine, skin, SHEENT, genitourinary, psychiatric and neurologic systems was obtained and is unremarkable with the exception of constipation, depression, frequent headache, joint pain, nausea and vomiting and stomach pain    EXAMINATION:   Visit Vitals    /90    Pulse 64    Ht 5' 5\" (1.651 m)    Wt 164 lb (74.4 kg)    SpO2 98%    BMI 27.29 kg/m2        General:   General appearance: Pt is in no acute distress   Distal pulses are preserved  Fundoscopic Exam: Normal    Neurological Examination:   Mental Status: AAO x3. Speech is fluent. Follows commands, has normal fund of knowledge, attention, short term recall, comprehension and insight. Cranial Nerves: Visual fields are full. PERRL, Extraocular movements are full. Facial sensation intact. Facial movement intact. Hearing intact to conversation. Palate elevates symmetrically. Shoulder shrug symmetric. Tongue midline. Motor: Strength is 5/5 in all 4 ext. No atrophy. Tone: Normal    Sensation: Normal to light touch    Reflexes: DTRs 2+ throughout. Coordination/Cerebellar: Intact to finger-nose-finger     Gait: Casual gait is normal.     Skin: No significant bruising or lacerations.     Laboratory review:   Results for orders placed or performed in visit on 06/95/88   METABOLIC PANEL, COMPREHENSIVE   Result Value Ref Range    Glucose 85 65 - 99 mg/dL    BUN 13 6 - 24 mg/dL    Creatinine 0.92 0.57 - 1.00 mg/dL    GFR est non-AA 74 >59 mL/min/1.73    GFR est AA 86 >59 mL/min/1.73    BUN/Creatinine ratio 14 9 - 23    Sodium 139 134 - 144 mmol/L    Potassium 4.2 3.5 - 5.2 mmol/L    Chloride 102 96 - 106 mmol/L    CO2 24 20 - 29 mmol/L    Calcium 9.3 8.7 - 10.2 mg/dL    Protein, total 6.4 6.0 - 8.5 g/dL    Albumin 4.3 3.5 - 5.5 g/dL    GLOBULIN, TOTAL 2.1 1.5 - 4.5 g/dL    A-G Ratio 2.0 1.2 - 2.2    Bilirubin, total 0.3 0.0 - 1.2 mg/dL    Alk. phosphatase 58 39 - 117 IU/L    AST (SGOT) 25 0 - 40 IU/L    ALT (SGPT) 19 0 - 32 IU/L   CBC WITH AUTOMATED DIFF   Result Value Ref Range    WBC 5.5 3.4 - 10.8 x10E3/uL    RBC 4.58 3.77 - 5.28 x10E6/uL    HGB 12.6 11.1 - 15.9 g/dL    HCT 38.4 34.0 - 46.6 %    MCV 84 79 - 97 fL    MCH 27.5 26.6 - 33.0 pg    MCHC 32.8 31.5 - 35.7 g/dL    RDW 13.5 12.3 - 15.4 %    PLATELET 875 482 - 950 x10E3/uL    NEUTROPHILS 64 Not Estab. %    Lymphocytes 28 Not Estab. %    MONOCYTES 6 Not Estab. %    EOSINOPHILS 1 Not Estab. %    BASOPHILS 1 Not Estab. %    ABS. NEUTROPHILS 3.6 1.4 - 7.0 x10E3/uL    Abs Lymphocytes 1.5 0.7 - 3.1 x10E3/uL    ABS. MONOCYTES 0.3 0.1 - 0.9 x10E3/uL    ABS. EOSINOPHILS 0.0 0.0 - 0.4 x10E3/uL    ABS. BASOPHILS 0.0 0.0 - 0.2 x10E3/uL    IMMATURE GRANULOCYTES 0 Not Estab. %    ABS. IMM. GRANS. 0.0 0.0 - 0.1 x10E3/uL       Imaging review:  None    Documentation review:  None    Assessment/Plan:   Jennifer Chaves is a 53 y.o. female who presented to the neurology office for management of episodic migraines. She is taking Imitrex oral but does have vomiting and throws up her medication as well. I am going to prescribe Imitrex intranasal spray. I also provided her Zomig intranasal spray to try. The patient has nonfocal examination and there is no need for imaging at this time.     Follow-up in 2 months    [facility] over the last two weeks 9/24/2018   Little interest or pleasure in doing things Not at all   Feeling down, depressed, irritable, or hopeless Several days   Total Score PHQ 2 1     Primary care to address possible depression if PHQ-9 score is more than 9. ICD-10-CM ICD-9-CM    1. Migraine without aura and without status migrainosus, not intractable G43.009 346.10 SUMAtriptan (IMITREX) 20 mg/actuation nasal spray      Thank you for allowing me to participate in the care of Ms. Hailee Champion. Please feel free to contact me if you have any questions. Jannie Neves MD  Neurologist    CC: Brit Kaiser MD  Fax: [phone]    This note was created using voice recognition software. Despite editing, there may be syntax errors.

## 2018-10-16 NOTE — PATIENT INSTRUCTIONS
1.  Sumatriptan 20 mg intranasal spray to be used in either nostril at the onset of headache  2. Follow-up in 2 months    Office Policies  · Phone calls/patient messages:  Please allow up to 24 hours for someone in the office to contact you about your call or message. Be mindful your provider may be out of the office or your message may require further review. We encourage you to use [employer] for your messages as this is a faster, more efficient way to communicate with our office  · Medication Refills:  Prescription medications require up to 48 business hours to process. We encourage you to use [employer] for your refills. For controlled medications: Please allow up to 72 business hours to process. Certain medications may require you to  a written prescription at our office. NO narcotic/controlled medications will be prescribed after 4pm Monday through Friday or on weekends  · Form/Paperwork Completion:  Please note there is a $25 fee for all paperwork completed by our providers. We ask that you allow 7-14 business days. Pre-payment is due prior to picking up/faxing the completed form. You may also download your forms to [employer] to have your doctor print off.

## 2018-10-16 NOTE — MR AVS SNAPSHOT
[address], 
[id number], [address] [facility] 
[phone] Patient: Estephania Tran MRN: [id number] XRI:[id number] Visit Information Date & Time Provider Department Dept. Phone Encounter #  
 10/16/2018  9:00 AM Griffin Mullen MD [facility] [phone] [id number] Your Appointments 11/27/2018 10:00 AM  
Follow Up with Griffin Mullen MD  
[facility]) Appt Note: follow up headache 10/16/18 [address], [address] [address]  
[facility], [address] [place] [address], [address] [place] [address] [address] [address] Upcoming Health Maintenance Date Due DTaP/Tdap/Td series (1 - Tdap) 9/19/1992 Influenza Age 5 to Adult 9/24/2019* PAP AKA CERVICAL CYTOLOGY 5/18/2019 *Topic was postponed. The date shown is not the original due date. Allergies as of 10/16/2018  Review Complete On: 9/24/2018 By: Cain Monsivais MD  
 No Known Allergies Current Immunizations  Never Reviewed No immunizations on file. Not reviewed this visit You Were Diagnosed With   
  
 Codes Comments Migraine without aura and without status migrainosus, not intractable    -  Primary ICD-10-CM: G43.009 ICD-9-CM: 346.10 Vitals BP Pulse Height(growth percentile) Weight(growth percentile) SpO2 BMI  
 122/90 64 5' 5\" (1.651 m) 164 lb (74.4 kg) 98% 27.29 kg/m2 OB Status Smoking Status Having regular periods Never Smoker Vitals History BMI and BSA Data Body Mass Index Body Surface Area  
 27.29 kg/m 2 1.85 m 2 Preferred Pharmacy Pharmacy Name Phone [address], [address] [place] [address]. [phone] Your Updated Medication List  
  
   
This list is accurate as of 10/16/18  9:47 AM.  Always use your most recent med list.  
  
  
  
  
 cyclobenzaprine 10 mg tablet Commonly known as:  FLEXERIL Take 1 Tab by mouth nightly as needed for Muscle Spasm(s). ondansetron 4 mg disintegrating tablet Commonly known as:  ZOFRAN ODT Take 1 Tab by mouth every eight (8) hours as needed for Nausea. SUMAtriptan 20 mg/actuation nasal spray Commonly known as:  IMITREX  
20 mg intranasal in either nostril at the onset of headache. SUMAtriptan 50 mg tablet Commonly known as:  IMITREX  
TAKE 1 TABLET BY MOUTH AT ONSET OF HEADACHE. MAY REPEAT ONCE AFTER 2 HOURS WITH MAX OF 100MG IN 24 HOURS Prescriptions Sent to Pharmacy Refills SUMAtriptan (IMITREX) 20 mg/actuation nasal spray 2 Si mg intranasal in either nostril at the onset of headache. Class: Normal  
 Pharmacy: [facility] [address] [place] [address], [address] [place] [address].  #: [phone] Patient Instructions 1. Sumatriptan 20 mg intranasal spray to be used in either nostril at the onset of headache 2. Follow-up in 2 months Office Policies · Phone calls/patient messages: Please allow up to 24 hours for someone in the office to contact you about your call or message. Be mindful your provider may be out of the office or your message may require further review. We encourage you to use [employer] for your messages as this is a faster, more efficient way to communicate with our office· Medication Refills: 
Prescription medications require up to 48 business hours to process. We encourage you to use [employer] for your refills. For controlled medications: Please allow up to 72 business hours to process. Certain medications may require you to  a written prescription at our office.  
NO narcotic/controlled medications will be prescribed after 4pm Monday through Friday or on weekends· Form/Paperwork Completion: 
Please note there is a $25 fee for all paperwork completed by our providers. We ask that you allow 7-14 business days. Pre-payment is due prior to picking up/faxing the completed form. You may also download your forms to [employer] to have your doctor print off. Introducing [facility]! Dear Collin Cannon: 
Thank you for requesting a [employer] account. Our records indicate that you already have an active [employer] account. You can access your account anytime at [email or web address] Did you know that you can access your hospital and ER discharge instructions at any time in [employer]? You can also review all of your test results from your hospital stay or ER visit. Additional Information If you have questions, please visit the Frequently Asked Questions section of the [employer] website at [email or web address] Remember, [employer] is NOT to be used for urgent needs. For medical emergencies, dial 911. Now available from your iPhone and Android! Please provide this summary of care documentation to your next provider. Your primary care clinician is listed as Surjit Farias. If you have any questions after today's visit, please call [phone].

## 2018-10-18 ENCOUNTER — HOSPITAL ENCOUNTER (EMERGENCY)
Age: 48
Discharge: HOME | End: 2018-10-18
Attending: EMERGENCY MEDICINE
Payer: COMMERCIAL

## 2018-10-18 ENCOUNTER — APPOINTMENT (OUTPATIENT)
Dept: GENERAL RADIOLOGY | Age: 48
End: 2018-10-18
Attending: EMERGENCY MEDICINE
Payer: COMMERCIAL

## 2018-10-18 VITALS
HEART RATE: 68 BPM | WEIGHT: 163.58 LBS | OXYGEN SATURATION: 99 % | TEMPERATURE: 97.9 F | HEIGHT: 65 IN | BODY MASS INDEX: 27.25 KG/M2 | DIASTOLIC BLOOD PRESSURE: 72 MMHG | SYSTOLIC BLOOD PRESSURE: 119 MMHG | RESPIRATION RATE: 16 BRPM

## 2018-10-18 DIAGNOSIS — K59.00 CONSTIPATION, UNSPECIFIED CONSTIPATION TYPE: ICD-10-CM

## 2018-10-18 DIAGNOSIS — R10.84 ABDOMINAL PAIN, GENERALIZED: Primary | ICD-10-CM

## 2018-10-18 LAB
ALBUMIN SERPL-MCNC: 3.7 G/DL (ref 3.5–5)
ALBUMIN/GLOB SERPL: 1 {RATIO} (ref 1.1–2.2)
ALP SERPL-CCNC: 72 U/L (ref 45–117)
ALT SERPL-CCNC: 20 U/L (ref 12–78)
ANION GAP SERPL CALC-SCNC: 4 MMOL/L (ref 5–15)
APPEARANCE UR: CLEAR
AST SERPL-CCNC: 27 U/L (ref 15–37)
BASOPHILS # BLD: 0 K/UL (ref 0–0.1)
BASOPHILS NFR BLD: 0 % (ref 0–1)
BILIRUB SERPL-MCNC: 0.5 MG/DL (ref 0.2–1)
BILIRUB UR QL: NEGATIVE
BUN SERPL-MCNC: 9 MG/DL (ref 6–20)
BUN/CREAT SERPL: 10 (ref 12–20)
CALCIUM SERPL-MCNC: 8.9 MG/DL (ref 8.5–10.1)
CHLORIDE SERPL-SCNC: 103 MMOL/L (ref 97–108)
CO2 SERPL-SCNC: 30 MMOL/L (ref 21–32)
COLOR UR: NORMAL
CREAT SERPL-MCNC: 0.86 MG/DL (ref 0.55–1.02)
DIFFERENTIAL METHOD BLD: NORMAL
EOSINOPHIL # BLD: 0.1 K/UL (ref 0–0.4)
EOSINOPHIL NFR BLD: 1 % (ref 0–7)
ERYTHROCYTE [DISTWIDTH] IN BLOOD BY AUTOMATED COUNT: 12.5 % (ref 11.5–14.5)
GLOBULIN SER CALC-MCNC: 3.6 G/DL (ref 2–4)
GLUCOSE SERPL-MCNC: 105 MG/DL (ref 65–100)
GLUCOSE UR STRIP.AUTO-MCNC: NEGATIVE MG/DL
HCT VFR BLD AUTO: 41.5 % (ref 35–47)
HGB BLD-MCNC: 13.6 G/DL (ref 11.5–16)
HGB UR QL STRIP: NEGATIVE
IMM GRANULOCYTES # BLD: 0 K/UL (ref 0–0.04)
IMM GRANULOCYTES NFR BLD AUTO: 0 % (ref 0–0.5)
KETONES UR QL STRIP.AUTO: NEGATIVE MG/DL
LEUKOCYTE ESTERASE UR QL STRIP.AUTO: NEGATIVE
LIPASE SERPL-CCNC: 127 U/L (ref 73–393)
LYMPHOCYTES # BLD: 1 K/UL (ref 0.8–3.5)
LYMPHOCYTES NFR BLD: 17 % (ref 12–49)
MCH RBC QN AUTO: 27.8 PG (ref 26–34)
MCHC RBC AUTO-ENTMCNC: 32.8 G/DL (ref 30–36.5)
MCV RBC AUTO: 84.7 FL (ref 80–99)
MONOCYTES # BLD: 0.4 K/UL (ref 0–1)
MONOCYTES NFR BLD: 6 % (ref 5–13)
NEUTS SEG # BLD: 4.4 K/UL (ref 1.8–8)
NEUTS SEG NFR BLD: 75 % (ref 32–75)
NITRITE UR QL STRIP.AUTO: NEGATIVE
NRBC # BLD: 0 K/UL (ref 0–0.01)
NRBC BLD-RTO: 0 PER 100 WBC
PH UR STRIP: 6 [PH] (ref 5–8)
PLATELET # BLD AUTO: 257 K/UL (ref 150–400)
PMV BLD AUTO: 10 FL (ref 8.9–12.9)
POTASSIUM SERPL-SCNC: 4.1 MMOL/L (ref 3.5–5.1)
PROT SERPL-MCNC: 7.3 G/DL (ref 6.4–8.2)
PROT UR STRIP-MCNC: NEGATIVE MG/DL
RBC # BLD AUTO: 4.9 M/UL (ref 3.8–5.2)
SODIUM SERPL-SCNC: 137 MMOL/L (ref 136–145)
SP GR UR REFRACTOMETRY: 1 (ref 1–1.03)
UROBILINOGEN UR QL STRIP.AUTO: 0.2 EU/DL (ref 0.2–1)
WBC # BLD AUTO: 5.9 K/UL (ref 3.6–11)

## 2018-10-18 PROCEDURE — 99283 EMERGENCY DEPT VISIT LOW MDM: CPT

## 2018-10-18 PROCEDURE — 85025 COMPLETE CBC W/AUTO DIFF WBC: CPT

## 2018-10-18 PROCEDURE — 83690 ASSAY OF LIPASE: CPT

## 2018-10-18 PROCEDURE — 80053 COMPREHEN METABOLIC PANEL: CPT

## 2018-10-18 PROCEDURE — 74018 RADEX ABDOMEN 1 VIEW: CPT

## 2018-10-18 PROCEDURE — 36415 COLL VENOUS BLD VENIPUNCTURE: CPT

## 2018-10-18 PROCEDURE — 81003 URINALYSIS AUTO W/O SCOPE: CPT

## 2018-10-18 RX ORDER — POLYETHYLENE GLYCOL 3350 17 G/17G
17 POWDER, FOR SOLUTION ORAL DAILY
Qty: 119 G | Refills: 0 | Status: SHIPPED | OUTPATIENT
Start: 2018-10-18 | End: 2019-03-13 | Stop reason: ALTCHOICE

## 2018-10-18 RX ORDER — SENNOSIDES 8.6 MG/1
1 TABLET ORAL DAILY
Qty: 15 TAB | Refills: 0 | Status: SHIPPED | OUTPATIENT
Start: 2018-10-18 | End: 2022-05-12

## 2018-10-18 NOTE — ED NOTES
Dr. Becca Garcia reviewed discharge instructions with the patient. The patient verbalized understanding. All questions and concerns were addressed. The patient declined a wheelchair and is discharged ambulatory in the care of family members with instructions and prescriptions in hand. Pt is alert and oriented x 4. Respirations are clear and unlabored.

## 2018-10-18 NOTE — ED PROVIDER NOTES
EMERGENCY DEPARTMENT HISTORY AND PHYSICAL EXAM 
 
 
Date: 10/18/2018 Patient Name: Bradford Fischer History of Presenting Illness Chief Complaint Patient presents with  Abdominal Pain  
  severe abd pain x 2 days, no n/v/d, VSS History Provided By: Patient HPI: Bradford Fischer, 53 y.o. female with no pertinent PMHx, presents ambulatory to the ED with cc of gradual onset, intermittent left side abd pain x 2 days with associated constipation. Pain is described as a sharp, cramping sensation that lasts for a few seconds at a time and occurs every few minutes. Pain is unchanged by any position or activity. Her last normal BM was last week. Pt states she was referred to the ED from the [address] [place] [address] at [facility] for evaluation due to concern for constipation and a request for an XR. Pt specifically denies a fever or chills. There are no other complaints, changes, or physical findings at this time. PCP: Karma Bridges MD 
 
Current Outpatient Medications Medication Sig Dispense Refill  polyethylene glycol (MIRALAX) 17 gram/dose powder Take 17 g by mouth daily. 1 tablespoon with 8 oz of water daily 119 g 0  
 senna (SENNA) 8.6 mg tablet Take 1 Tab by mouth daily. 15 Tab 0  
 SUMAtriptan (IMITREX) 20 mg/actuation nasal spray 20 mg intranasal in either nostril at the onset of headache. 6 Container 2  
 ondansetron (ZOFRAN ODT) 4 mg disintegrating tablet Take 1 Tab by mouth every eight (8) hours as needed for Nausea. 15 Tab 0  
 SUMAtriptan (IMITREX) 50 mg tablet TAKE 1 TABLET BY MOUTH AT ONSET OF HEADACHE. MAY REPEAT ONCE AFTER 2 HOURS WITH MAX OF 100MG IN 24 HOURS 9 Tab 1  cyclobenzaprine (FLEXERIL) 10 mg tablet Take 1 Tab by mouth nightly as needed for Muscle Spasm(s). 30 Tab 0 Past History Past Medical History: 
Past Medical History:  
Diagnosis Date  Headache   
 Headache(784.0)   
 migraine  Migraine 2018  TMJ (dislocation of temporomandibular joint) Past Surgical History: 
Past Surgical History:  
Procedure Laterality Date  HX CYST REMOVAL  2003  HX ORTHOPAEDIC    
 ganglion cyst removal  
 
 
Family History: 
Family History Problem Relation Age of Onset  Stroke Paternal Grandmother  Alzheimer Paternal Grandmother  Cancer Paternal Grandfather   
     lung  Hypertension Mother  Glaucoma Mother  Cancer Father  Hypertension Brother  Cancer Paternal Uncle   
     pancreatic Social History: 
Social History Tobacco Use  Smoking status: Never Smoker  Smokeless tobacco: Never Used Substance Use Topics  Alcohol use: Yes Alcohol/week: 0.0 oz Types: 5 - 6 Glasses of wine per week Comment: 1 times a week  Drug use: No  
 
 
Allergies: 
No Known Allergies Review of Systems Review of Systems Constitutional: Negative for chills, fatigue and fever. HENT: Negative for congestion, ear pain and rhinorrhea. Eyes: Negative for pain and visual disturbance. Respiratory: Negative for cough and shortness of breath. Cardiovascular: Negative for chest pain and leg swelling. Gastrointestinal: Positive for abdominal pain and constipation. Negative for diarrhea, nausea and vomiting. Genitourinary: Negative for dysuria and flank pain. Musculoskeletal: Negative for back pain and neck pain. Skin: Negative for rash and wound. Neurological: Negative for dizziness, syncope and headaches. Psychiatric/Behavioral: Negative for self-injury and suicidal ideas. Physical Exam  
Physical Exam  
 
GENERAL: alert and oriented, no acute distress EYES: PEERL, No injection, discharge or icterus. HENT: Mucous membranes pink and moist. 
NECK: Supple LUNGS: Airway patent. Non-labored respirations. Breath sounds clear with good air entry bilaterally. HEART: Regular rate and rhythm. No peripheral edema ABDOMEN: Non-distended and non-tender, without guarding or rebound. SKIN:  warm, dry MSK/ EXTREMITIES: Without swelling, tenderness or deformity, symmetric with normal ROM 
NEUROLOGICAL: Alert, oriented Diagnostic Study Results Labs - Recent Results (from the past 12 hour(s)) URINALYSIS W/ RFLX MICROSCOPIC Collection Time: 10/18/18 10:30 AM  
Result Value Ref Range Color YELLOW/STRAW Appearance CLEAR CLEAR Specific gravity 1.005 1.003 - 1.030    
 pH (UA) 6.0 5.0 - 8.0 Protein NEGATIVE  NEG mg/dL Glucose NEGATIVE  NEG mg/dL Ketone NEGATIVE  NEG mg/dL Bilirubin NEGATIVE  NEG Blood NEGATIVE  NEG Urobilinogen 0.2 0.2 - 1.0 EU/dL Nitrites NEGATIVE  NEG Leukocyte Esterase NEGATIVE  NEG    
CBC WITH AUTOMATED DIFF Collection Time: 10/18/18 11:02 AM  
Result Value Ref Range WBC 5.9 3.6 - 11.0 K/uL  
 RBC 4.90 3.80 - 5.20 M/uL  
 HGB 13.6 11.5 - 16.0 g/dL HCT 41.5 35.0 - 47.0 % MCV 84.7 80.0 - 99.0 FL  
 MCH 27.8 26.0 - 34.0 PG  
 MCHC 32.8 30.0 - 36.5 g/dL  
 RDW 12.5 11.5 - 14.5 % PLATELET 284 542 - 826 K/uL MPV 10.0 8.9 - 12.9 FL  
 NRBC 0.0 0  WBC ABSOLUTE NRBC 0.00 0.00 - 0.01 K/uL NEUTROPHILS 75 32 - 75 % LYMPHOCYTES 17 12 - 49 % MONOCYTES 6 5 - 13 % EOSINOPHILS 1 0 - 7 % BASOPHILS 0 0 - 1 % IMMATURE GRANULOCYTES 0 0.0 - 0.5 % ABS. NEUTROPHILS 4.4 1.8 - 8.0 K/UL  
 ABS. LYMPHOCYTES 1.0 0.8 - 3.5 K/UL  
 ABS. MONOCYTES 0.4 0.0 - 1.0 K/UL  
 ABS. EOSINOPHILS 0.1 0.0 - 0.4 K/UL  
 ABS. BASOPHILS 0.0 0.0 - 0.1 K/UL  
 ABS. IMM. GRANS. 0.0 0.00 - 0.04 K/UL  
 DF AUTOMATED METABOLIC PANEL, COMPREHENSIVE Collection Time: 10/18/18 11:02 AM  
Result Value Ref Range Sodium 137 136 - 145 mmol/L Potassium 4.1 3.5 - 5.1 mmol/L Chloride 103 97 - 108 mmol/L  
 CO2 30 21 - 32 mmol/L Anion gap 4 (L) 5 - 15 mmol/L Glucose 105 (H) 65 - 100 mg/dL BUN 9 6 - 20 MG/DL Creatinine 0.86 0.55 - 1.02 MG/DL  
 BUN/Creatinine ratio 10 (L) 12 - 20 GFR est AA >60 >60 ml/min/1.73m2 GFR est non-AA >60 >60 ml/min/1.73m2 Calcium 8.9 8.5 - 10.1 MG/DL Bilirubin, total 0.5 0.2 - 1.0 MG/DL  
 ALT (SGPT) 20 12 - 78 U/L  
 AST (SGOT) 27 15 - 37 U/L Alk. phosphatase 72 45 - 117 U/L Protein, total 7.3 6.4 - 8.2 g/dL Albumin 3.7 3.5 - 5.0 g/dL Globulin 3.6 2.0 - 4.0 g/dL A-G Ratio 1.0 (L) 1.1 - 2.2 LIPASE Collection Time: 10/18/18 11:02 AM  
Result Value Ref Range Lipase 127 73 - 393 U/L Radiologic Studies -  
XR ABD (KUB) Final Result History: Abdominal pain. An AP radiograph of the abdomen demonstrates an unremarkable bowel gas pattern. There are phleboliths in the pelvis. There are no abnormal calcifications. The  
osseous structures appear unremarkable. IMPRESSION: No acute findings. Medical Decision Making I am the first provider for this patient. I reviewed the vital signs, available nursing notes, past medical history, past surgical history, family history and social history. Vital Signs-Reviewed the patient's vital signs. Patient Vitals for the past 12 hrs: 
 Temp Pulse Resp BP SpO2  
10/18/18 1016 97.9 °F (36.6 °C) 68 16 137/86 100 % Pulse Oximetry Analysis - 100% on RA Cardiac Monitor:  
Rate: 68 bpm 
Rhythm: Normal Sinus Rhythm Records Reviewed: Nursing Notes and Old Medical Records Provider Notes (Medical Decision Making): The patient presents with a chief complaint of abdominal pain. Differential diagnosis considered includes acute appendicitis, acute cholecystitis, pancreatitis, gastritis, PUD, diverticulitis, mesenteric ischemia, abdominal aortic aneurysm, bowel obstruction, enteritis, colitis, fecal impaction, volvulus, IBS, inflammatory bowel disease, specific food intolerance, peritonitis, perforated viscous, malignancy, UTI, abscess, and abdominal pain NOS.   Pelvic source of pain was also considered including endometritis, dysmenorrhea, ovarian cyst, ovarian torsion, PID, TOA, cervicitis, vaginitis, or uterine fibroid. All labs and diagnostic studies were reviewed as above and negative. Clinical examination, history, and work-up exclude some of the more serious diagnoses as listed above. Cause of the abdominal pain was not clearly identified but may be due to constipation given her hx. Although the cause of the patients abdominal pain was not clearly identified, the examination was non-surgical and remained benign on repeat exam.  Pt is tolerating po. The patient states that Her symptoms have resolved and she feels much better. There are no other new complaints at this time There were no concerns for any acute life-threatening or surgical pathology that would warrant admission at this time. Vital signs were within acceptable limits at the time of discharge. Patient was in stable condition and felt to be reliable for outpatient management. Patient was given IV hydration and pain medications during the ED course] and had symptomatic improvement. There were no lab findings of immediate concern. The patient was advised to return to the emergency room for acutely worsening pain, persistence of pain, fevers, bloody stools, intractable vomiting or bloody emesis, inability to tolerate food/liquids, syncope, or change in mental status. Otherwise, the patient is encouraged to follow-up with a primary care physician within the week if possible. The patient understood the work-up and assessment and had no further questions. The patient was discharged home in stable clinical condition. ED Course:  
Initial assessment performed. The patients presenting problems have been discussed, and they are in agreement with the care plan formulated and outlined with them. I have encouraged them to ask questions as they arise throughout their visit. Critical Care Time:  
0 Disposition: 
DISCHARGE NOTE: 
12:40 PM 
 The patient is ready for discharge. The patients signs, symptoms, diagnosis, and instructions for discharge have been discussed and the pt has conveyed their understanding. The patient is to follow up as recommended or return to the ER should their symptoms worsen. Plan has been discussed and patient has conveyed their agreement. PLAN: 
1. Discharge Current Discharge Medication List  
  
START taking these medications Details  
polyethylene glycol (MIRALAX) 17 gram/dose powder Take 17 g by mouth daily. 1 tablespoon with 8 oz of water daily 
Qty: 119 g, Refills: 0  
  
senna (SENNA) 8.6 mg tablet Take 1 Tab by mouth daily. Qty: 15 Tab, Refills: 0  
  
  
 
2. Follow-up Information Follow up With Specialties Details Why Contact Info Jaiden Lund MD Family Practice Schedule an appointment as soon as possible for a visit in 2 days  [address] [facility]. 
[phone] Return to ED if worse Diagnosis Clinical Impression: 1. Abdominal pain, generalized 2. Constipation, unspecified constipation type Attestations: This note is prepared by Edmundo Castle, acting as Scribe for [employer]. Annabelle Neal MD. Andre Neal MD: The scribe's documentation has been prepared under my direction and personally reviewed by me in its entirety. I confirm that the note above accurately reflects all work, treatment, procedures, and medical decision making performed by me.

## 2018-11-19 ENCOUNTER — HOSPITAL ENCOUNTER (OUTPATIENT)
Dept: GENERAL RADIOLOGY | Age: 48
Discharge: HOME | End: 2018-11-19
Payer: COMMERCIAL

## 2018-11-19 DIAGNOSIS — M79.10 MUSCLE PAIN: ICD-10-CM

## 2018-11-19 PROCEDURE — 72040 X-RAY EXAM NECK SPINE 2-3 VW: CPT

## 2019-02-22 DIAGNOSIS — G43.909 MIGRAINE WITHOUT STATUS MIGRAINOSUS, NOT INTRACTABLE, UNSPECIFIED MIGRAINE TYPE: ICD-10-CM

## 2019-02-25 RX ORDER — SUMATRIPTAN 50 MG/1
TABLET, FILM COATED ORAL
Qty: 9 TAB | Refills: 0 | Status: SHIPPED | OUTPATIENT
Start: 2019-02-25 | End: 2019-06-26 | Stop reason: SDUPTHER

## 2019-03-12 ENCOUNTER — TELEPHONE (OUTPATIENT)
Dept: INTERNAL MEDICINE CLINIC | Age: 49
End: 2019-03-12

## 2019-03-12 NOTE — TELEPHONE ENCOUNTER
Identified patient 2 identifiers verified. Patient called requesting an appointment for depression because of the death of a cousin she was close to. Patient denies being suicidal or wanting to hurt anyone. Patient declined going to [facility] for evaluation,  Accepted an appointment  With Dr. Bolivar Crowe on 3/13/18 . Patient encouraged to go to ED if  Symptoms worsen. Patient agreed. Patient last seen by Dr. Bolivar Crowe 9/18. No appointments with other providers were available.

## 2019-03-13 ENCOUNTER — OFFICE VISIT (OUTPATIENT)
Dept: INTERNAL MEDICINE CLINIC | Age: 49
End: 2019-03-13

## 2019-03-13 VITALS
HEIGHT: 65 IN | WEIGHT: 165 LBS | HEART RATE: 62 BPM | TEMPERATURE: 98.1 F | OXYGEN SATURATION: 98 % | RESPIRATION RATE: 18 BRPM | BODY MASS INDEX: 27.49 KG/M2 | DIASTOLIC BLOOD PRESSURE: 72 MMHG | SYSTOLIC BLOOD PRESSURE: 117 MMHG

## 2019-03-13 DIAGNOSIS — F32.9 REACTIVE DEPRESSION: Primary | ICD-10-CM

## 2019-03-13 RX ORDER — BUPROPION HYDROCHLORIDE 150 MG/1
150 TABLET ORAL
Qty: 30 TAB | Refills: 2 | Status: SHIPPED | OUTPATIENT
Start: 2019-03-13 | End: 2019-04-12 | Stop reason: SDUPTHER

## 2019-03-13 RX ORDER — VILAZODONE HYDROCHLORIDE 20 MG/1
20 TABLET ORAL DAILY
Qty: 30 TAB | Refills: 3 | Status: SHIPPED | OUTPATIENT
Start: 2019-03-13 | End: 2020-03-16

## 2019-03-13 NOTE — PROGRESS NOTES
SUBJECTIVE:   Ms. Milan House is a 53 y.o. female who is here for follow up of routine medical issues. Mood: Pt reports she is under a lot of stress lately. She notes the recent, sudden passing of her cousin, whom she was close with growing up. Pt reports issues within her marriage. She states her and her  have been going to counseling, but she has not noticed a difference. Pt reports her  works out of [place] and spends 10 days/month at home. This schedule has placed limitations of their ability to be foster parents, which has been disappointing to her. Pt is unsure what the best path forward is. 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 At this time, she is otherwise doing well and has brought no other complaints to my attention today. For a list of the medical issues addressed today, see the assessment and plan below. PMH:   Past Medical History:   Diagnosis Date    Headache     Headache(784.0)     migraine    Migraine 2018    TMJ (dislocation of temporomandibular joint)      PSH:  has a past surgical history that includes hx orthopaedic and hx cyst removal (2003). All: has No Known Allergies. MEDS:   Current Outpatient Medications   Medication Sig    vilazodone (VIIBRYD) 20 mg tab tablet Take 1 Tab by mouth daily.  vilazodone (VIIBRYD) 10 mg (7)- 20 mg (23) DsPk Take one tablet daily.  buPROPion XL (WELLBUTRIN XL) 150 mg tablet Take 1 Tab by mouth every morning.  SUMAtriptan (IMITREX) 50 mg tablet TAKE ONE TABLET BY MOUTH AT ONSET OF HEADACHE; MAY REPEAT ONE TABLET IN 2 HOURS IF NEEDED.  MG IN 24 HOURS    senna (SENNA) 8.6 mg tablet Take 1 Tab by mouth daily.  ondansetron (ZOFRAN ODT) 4 mg disintegrating tablet Take 1 Tab by mouth every eight (8) hours as needed for Nausea. No current facility-administered medications for this visit.         FH: family history includes Alzheimer in her paternal grandmother; Cancer in her father, paternal grandfather, and paternal uncle; Glaucoma in her mother; Hypertension in her brother and mother; Stroke in her paternal grandmother. SH:  reports that  has never smoked. she has never used smokeless tobacco. She reports that she drinks alcohol. She reports that she does not use drugs. Review of Systems - History obtained from the patient  General ROS: no fever, chills, fatigue, body aches  Psychological ROS: depression no change in anxiety, SI/HI  Ophthalmic ROS: no blurred vision, myopia, double vision  ENT ROS: no dysphagia, otalgia, otorrhea, rhinorrhea, post nasal drip  Respiratory ROS: no cough, shortness of breath, or wheezing  Cardiovascular ROS: no chest pain or dyspnea on exertion  Gastrointestinal ROS: no abdominal pain, change in bowel habits, or black or bloody stools  Genito-Urinary ROS: no frequency, urgency, incontinence, dysuria, hematuria  Musculoskeletal ROS: no arthralgia, myalgia  Neurological ROS: no headaches, dizziness, lightheadedness, tremors, seizures  Dermatological ROS: no rash or lesions    OBJECTIVE:   Vitals:   Visit Vitals  /72 (BP 1 Location: Left arm, BP Patient Position: Sitting)   Pulse 62   Temp 98.1 °F (36.7 °C) (Oral)   Resp 18   Ht 5' 5\" (1.651 m)   Wt 165 lb (74.8 kg)   LMP 02/13/2019 (Within Days)   SpO2 98%   BMI 27.46 kg/m²      Gen: + stress reaction Pleasant 53 y.o.  female   HEENT: PERRLA. EOMI. OP moist and pink. Neck: Supple. No LAD. HEART: RRR, No M/G/R.      LUNGS: CTAB No W/R. EXTREMITIES: Warm. No C/C/E.    MUSCULOSKELETAL: Normal ROM, muscle strength 5/5 all groups. NEURO: Alert and oriented x 3. Cranial nerves grossly intact. No focal sensory or motor deficits noted. SKIN: Warm. Dry. No rashes or other lesions noted. ASSESSMENT/ PLAN: Diagnoses and all orders for this visit:    1. Reactive depression  -     vilazodone (VIIBRYD) 20 mg tab tablet; Take 1 Tab by mouth daily.   -     vilazodone (VIIBRYD) 10 mg (7)- 20 mg (23) DsPk; Take one tablet daily.  -     REFERRAL TO PSYCHOLOGY  -     buPROPion XL (WELLBUTRIN XL) 150 mg tablet; Take 1 Tab by mouth every morning. ICD-10-CM ICD-9-CM    1. Reactive depression F32.9 300.4 vilazodone (VIIBRYD) 20 mg tab tablet      vilazodone (VIIBRYD) 10 mg (7)- 20 mg (23) DsPk      REFERRAL TO PSYCHOLOGY      buPROPion XL (WELLBUTRIN XL) 150 mg tablet      1. Reactive depression  I prescribed viibryd. If the Wanda Mayberry is not covered by her insurance she will take the wellbutrin xl to treat pt's depression and advised her to follow up with any effects of the medications. I referred pt to psychology for further counseling and provided pt information to select a counselor from. Follow-up Disposition:  Return in about 1 month (around 4/13/2019) for follow up depression. . At least 25 minutes was spent with this patient . Greater than 50% of this  25 minute visit was spent in counseling and coordinating care regarding all of her medical problems listed in this assessment and plan. I have reviewed the patient's medications and risks/side effects/benefits were discussed. Diagnosis(-es) explained to patient and questions answered. Literature provided where appropriate.      Written by Giluia Gusman, as dictated by Racheal Westbrook MD.

## 2019-03-13 NOTE — PROGRESS NOTES
Reviewed record in preparation for visit and have obtained necessary documentation. Identified pt with two pt identifiers(name and ). Chief Complaint   Patient presents with   [facility] Depression       Health Maintenance Due   Topic Date Due    DTaP/Tdap/Td  (1 - Tdap) 1992    Pap Test  2019       Ms. Maru Randhawa has a reminder for a \"due or due soon\" health maintenance. I have asked that she discuss this further with her primary care provider for follow-up on this health maintenance. Coordination of Care Questionnaire:  :     1) Have you been to an emergency room, urgent care clinic since your last visit? Yes, [facility] Dx: Flu in 2018    Hospitalized since your last visit? no             2) Have you seen or consulted any other health care providers outside of [address] [place] [address] since your last visit? yes  (Include any pap smears or colon screenings in this section.)    3) In the event something were to happen to you and you were unable to speak on your behalf, do you have an Advance Directive/ Living Will in place stating your wishes? No     Do you have an Advance Directive on file? No    4) Are you interested in receiving information on Advance Directives? Yes     Patient is accompanied by self I have received verbal consent from Hitesh Peck to discuss any/all medical information while they are present in the room.

## 2019-04-12 ENCOUNTER — OFFICE VISIT (OUTPATIENT)
Dept: INTERNAL MEDICINE CLINIC | Age: 49
End: 2019-04-12

## 2019-04-12 VITALS
RESPIRATION RATE: 18 BRPM | SYSTOLIC BLOOD PRESSURE: 145 MMHG | DIASTOLIC BLOOD PRESSURE: 90 MMHG | WEIGHT: 162 LBS | BODY MASS INDEX: 26.03 KG/M2 | HEART RATE: 78 BPM | HEIGHT: 66 IN | TEMPERATURE: 98.2 F | OXYGEN SATURATION: 99 %

## 2019-04-12 DIAGNOSIS — F32.9 REACTIVE DEPRESSION: ICD-10-CM

## 2019-04-12 RX ORDER — BUPROPION HYDROCHLORIDE 150 MG/1
150 TABLET ORAL
Qty: 30 TAB | Refills: 2 | Status: SHIPPED | OUTPATIENT
Start: 2019-04-12 | End: 2019-06-17 | Stop reason: SDUPTHER

## 2019-04-12 NOTE — PROGRESS NOTES
SUBJECTIVE:   Ms. Radha Schmid is a 53 y.o. female who is here for follow up of routine medical issues. Mood: Pt reports since 3/13 she has been taking wellbutrin xl 150mg for reactive depression, since viibryd was not covered by her insurance. Pt reports she is feeling much better as far as control of her emotions since starting the medication. She reports she is still in a stressful situation at home as far as her relationship and finances. Pt is still searching for a counselor and is not established with one currently. At this time, she is otherwise doing well and has brought no other complaints to my attention today. For a list of the medical issues addressed today, see the assessment and plan below. PMH:   Past Medical History:   Diagnosis Date    Headache     Headache(784.0)     migraine    Migraine 2018    TMJ (dislocation of temporomandibular joint)      PSH:  has a past surgical history that includes hx orthopaedic and hx cyst removal (2003). All: has No Known Allergies. MEDS:   Current Outpatient Medications   Medication Sig    buPROPion XL (WELLBUTRIN XL) 150 mg tablet Take 1 Tab by mouth every morning.  SUMAtriptan (IMITREX) 50 mg tablet TAKE ONE TABLET BY MOUTH AT ONSET OF HEADACHE; MAY REPEAT ONE TABLET IN 2 HOURS IF NEEDED.  MG IN 24 HOURS    senna (SENNA) 8.6 mg tablet Take 1 Tab by mouth daily.  ondansetron (ZOFRAN ODT) 4 mg disintegrating tablet Take 1 Tab by mouth every eight (8) hours as needed for Nausea.  vilazodone (VIIBRYD) 20 mg tab tablet Take 1 Tab by mouth daily. No current facility-administered medications for this visit. FH: family history includes Alzheimer in her paternal grandmother; Cancer in her father, paternal grandfather, and paternal uncle; Glaucoma in her mother; Hypertension in her brother and mother; Stroke in her paternal grandmother. SH:  reports that she has never smoked.  She has never used smokeless tobacco. She reports that she drinks alcohol. She reports that she has current or past drug history. Drug: Prescription. Review of Systems - History obtained from the patient  General ROS: no fever, chills, fatigue, body aches  Psychological ROS: depression no change in anxiety, SI/HI  Ophthalmic ROS: no blurred vision, myopia, double vision  ENT ROS: no dysphagia, otalgia, otorrhea, rhinorrhea, post nasal drip  Respiratory ROS: no cough, shortness of breath, or wheezing  Cardiovascular ROS: no chest pain or dyspnea on exertion  Gastrointestinal ROS: no abdominal pain, change in bowel habits, or black or bloody stools  Genito-Urinary ROS: no frequency, urgency, incontinence, dysuria, hematuria  Musculoskeletal ROS: no arthralgia, myalgia  Neurological ROS: no headaches, dizziness, lightheadedness, tremors, seizures  Dermatological ROS: no rash or lesions    OBJECTIVE:   Vitals:   Visit Vitals  /90 (BP 1 Location: Left arm, BP Patient Position: Sitting)   Pulse 78   Temp 98.2 °F (36.8 °C)   Resp 18   Ht 5' 6\" (1.676 m)   Wt 162 lb (73.5 kg)   LMP 03/12/2019   SpO2 99%   BMI 26.15 kg/m²      Gen: Pleasant 53 y.o.  female in NAD. HEENT: PERRLA. EOMI. OP moist and pink. Neck: Supple. No LAD. HEART: RRR, No M/G/R.      LUNGS: CTAB No W/R. EXTREMITIES: Warm. No C/C/E.    MUSCULOSKELETAL: Normal ROM, muscle strength 5/5 all groups. NEURO: Alert and oriented x 3. Cranial nerves grossly intact. No focal sensory or motor deficits noted. SKIN: Warm. Dry. No rashes or other lesions noted. ASSESSMENT/ PLAN: Diagnoses and all orders for this visit:    1. Reactive depression  -     buPROPion XL (WELLBUTRIN XL) 150 mg tablet; Take 1 Tab by mouth every morning. ICD-10-CM ICD-9-CM    1. Reactive depression F32.9 300.4 buPROPion XL (WELLBUTRIN XL) 150 mg tablet      1. Reactive depression  I advised pt to continue with wellbutrin xl 150mg.  Pt's current priority is to establish care with a psychologist. Follow-up and Dispositions    · Return in about 3 months (around 7/12/2019) for folow up. I have reviewed the patient's medications and risks/side effects/benefits were discussed. Diagnosis(-es) explained to patient and questions answered. Literature provided where appropriate.      Written by Roxann Charles, as dictated by Dawna Baumann MD.

## 2019-04-12 NOTE — PROGRESS NOTES
1. Have you been to the ER,No urgent care clinic since your last visit? No  Hospitalized since your last visit? No    2. Have you seen or consulted any other health care providers outside of the [address] [place] [address] since your last visit? Include any pap smears or colon screening.    Last mammogram 12/18  Pap last year 5/18/16

## 2019-06-13 NOTE — PROGRESS NOTES
Chief Complaint   Patient presents with    Documentation     foster adotion paperwork    Medication Refill     migraine, sumatriptan Ashleigh Venegas MD

## 2019-06-17 DIAGNOSIS — F32.9 REACTIVE DEPRESSION: ICD-10-CM

## 2019-06-17 RX ORDER — BUPROPION HYDROCHLORIDE 150 MG/1
TABLET ORAL
Qty: 30 TAB | Refills: 1 | Status: SHIPPED | OUTPATIENT
Start: 2019-06-17 | End: 2019-10-08 | Stop reason: SDUPTHER

## 2019-06-26 DIAGNOSIS — R11.2 NAUSEA AND VOMITING, INTRACTABILITY OF VOMITING NOT SPECIFIED, UNSPECIFIED VOMITING TYPE: ICD-10-CM

## 2019-06-26 DIAGNOSIS — G43.909 MIGRAINE WITHOUT STATUS MIGRAINOSUS, NOT INTRACTABLE, UNSPECIFIED MIGRAINE TYPE: ICD-10-CM

## 2019-06-26 RX ORDER — ONDANSETRON 4 MG/1
TABLET, ORALLY DISINTEGRATING ORAL
Qty: 9 TAB | Refills: 0 | Status: SHIPPED | OUTPATIENT
Start: 2019-06-26 | End: 2019-12-31

## 2019-06-26 RX ORDER — SUMATRIPTAN 50 MG/1
TABLET, FILM COATED ORAL
Qty: 9 TAB | Refills: 0 | Status: SHIPPED | OUTPATIENT
Start: 2019-06-26 | End: 2019-09-03 | Stop reason: SDUPTHER

## 2019-07-26 ENCOUNTER — TELEPHONE (OUTPATIENT)
Dept: INTERNAL MEDICINE CLINIC | Age: 49
End: 2019-07-26

## 2019-07-26 NOTE — TELEPHONE ENCOUNTER
Monday, July 29, 2019 02:00 PMscheduled for right side pain. Mail box was full unable to leave a message.  [employer] message was sent to patient

## 2019-07-26 NOTE — TELEPHONE ENCOUNTER
----- Message from Chavo Arora sent at 7/26/2019 10:56 AM EDT -----  Regarding: Dr. [place]: [phone]  Pt experiencing pain in(r) side. Pt requesting to be seen next week, however, there are no available appts.     Copy/paste [place]

## 2019-07-29 ENCOUNTER — OFFICE VISIT (OUTPATIENT)
Dept: INTERNAL MEDICINE CLINIC | Age: 49
End: 2019-07-29

## 2019-07-29 VITALS
SYSTOLIC BLOOD PRESSURE: 146 MMHG | WEIGHT: 179 LBS | TEMPERATURE: 97.8 F | HEIGHT: 66 IN | HEART RATE: 69 BPM | BODY MASS INDEX: 28.77 KG/M2 | RESPIRATION RATE: 18 BRPM | DIASTOLIC BLOOD PRESSURE: 76 MMHG | OXYGEN SATURATION: 99 %

## 2019-07-29 DIAGNOSIS — M25.551 RIGHT HIP PAIN: Primary | ICD-10-CM

## 2019-07-29 DIAGNOSIS — K62.89 CHRONIC IDIOPATHIC ANAL PAIN: ICD-10-CM

## 2019-07-29 DIAGNOSIS — G89.29 CHRONIC RIGHT-SIDED LOW BACK PAIN WITH RIGHT-SIDED SCIATICA: ICD-10-CM

## 2019-07-29 DIAGNOSIS — G89.29 CHRONIC IDIOPATHIC ANAL PAIN: ICD-10-CM

## 2019-07-29 DIAGNOSIS — M54.41 ACUTE BACK PAIN WITH SCIATICA, RIGHT: ICD-10-CM

## 2019-07-29 DIAGNOSIS — M54.41 CHRONIC RIGHT-SIDED LOW BACK PAIN WITH RIGHT-SIDED SCIATICA: ICD-10-CM

## 2019-07-29 NOTE — PATIENT INSTRUCTIONS
Office Policies    Phone calls/patient messages:            Please allow up to 24 hours for someone in the office to contact you about your call or message. Be mindful your provider may be out of the office or your message may require further review. We encourage you to use [employer] for your messages as this is a faster, more efficient way to communicate with our office                         Medication Refills:            Prescription medications require 48-72 business hours to process. We encourage you to use [employer] for your refills. For controlled medications: Please allow 72 business hours to process. Certain medications may require you to  a written prescription at our office. NO narcotic/controlled medications will be prescribed after 4pm Monday through Friday or on weekends              Form/Paperwork Completion:            Please note a $25 fee may incur for all paperwork for completed by our providers. We ask that you allow 7-10 business days. Pre-payment is due prior to picking up/faxing the completed form. You may also download your forms to [employer] to have your doctor print off. Sciatica: Exercises  Introduction  Here are some examples of typical rehabilitation exercises for your condition. Start each exercise slowly. Ease off the exercise if you start to have pain. Your doctor or physical therapist will tell you when you can start these exercises and which ones will work best for you. When you are not being active, find a comfortable position for rest. Some people are comfortable on the floor or a medium-firm bed with a small pillow under their head and another under their knees. Some people prefer to lie on their side with a pillow between their knees. Don't stay in one position for too long. Take short walks (10 to 20 minutes) every 2 to 3 hours. Avoid slopes, hills, and stairs until you feel better.  Walk only distances you can manage without pain, especially leg pain.  How to do the exercises  Back stretches    1. Get down on your hands and knees on the floor. 2. Relax your head and allow it to droop. Round your back up toward the ceiling until you feel a nice stretch in your upper, middle, and lower back. Hold this stretch for as long as it feels comfortable, or about 15 to 30 seconds. 3. Return to the starting position with a flat back while you are on your hands and knees. 4. Let your back sway by pressing your stomach toward the floor. Lift your buttocks toward the ceiling. 5. Hold this position for 15 to 30 seconds. 6. Repeat 2 to 4 times. Follow-up care is a key part of your treatment and safety. Be sure to make and go to all appointments, and call your doctor if you are having problems. It's also a good idea to know your test results and keep a list of the medicines you take. Where can you learn more? Go to http://kelseyGigaFin Networksamy.info/. Enter I051 in the search box to learn more about \"Sciatica: Exercises. \"  Current as of: September 20, 2018  Content Version: 12.1  © 4943-1250 [employer]. Care instructions adapted under license by [employer] (which disclaims liability or warranty for this information). If you have questions about a medical condition or this instruction, always ask your healthcare professional. [address] any warranty or liability for your use of this information. Sciatica: Exercises  Introduction  Here are some examples of typical rehabilitation exercises for your condition. Start each exercise slowly. Ease off the exercise if you start to have pain. Your doctor or physical therapist will tell you when you can start these exercises and which ones will work best for you. When you are not being active, find a comfortable position for rest. Some people are comfortable on the floor or a medium-firm bed with a small pillow under their head and another under their knees. Some people prefer to lie on their side with a pillow between their knees. Don't stay in one position for too long. Take short walks (10 to 20 minutes) every 2 to 3 hours. Avoid slopes, hills, and stairs until you feel better. Walk only distances you can manage without pain, especially leg pain. How to do the exercises  Back stretches    7. Get down on your hands and knees on the floor. 8. Relax your head and allow it to droop. Round your back up toward the ceiling until you feel a nice stretch in your upper, middle, and lower back. Hold this stretch for as long as it feels comfortable, or about 15 to 30 seconds. 9. Return to the starting position with a flat back while you are on your hands and knees. 10. Let your back sway by pressing your stomach toward the floor. Lift your buttocks toward the ceiling. 11. Hold this position for 15 to 30 seconds. 12. Repeat 2 to 4 times. Follow-up care is a key part of your treatment and safety. Be sure to make and go to all appointments, and call your doctor if you are having problems. It's also a good idea to know your test results and keep a list of the medicines you take. Where can you learn more? Go to http://kelsey-amy.info/. Enter L656 in the search box to learn more about \"Sciatica: Exercises. \"  Current as of: September 20, 2018  Content Version: 12.1  © 1031-0027 [employer]. Care instructions adapted under license by [employer] (which disclaims liability or warranty for this information). If you have questions about a medical condition or this instruction, always ask your healthcare professional. [place] [address] any warranty or liability for your use of this information. Hip Arthritis: Exercises  Introduction  Here are some examples of exercises for you to try. The exercises may be suggested for a condition or for rehabilitation. Start each exercise slowly.  Ease off the exercises if you start to have pain. You will be told when to start these exercises and which ones will work best for you. How to do the exercises  Straight-leg raises to the outside    13. Lie on your side, with your affected hip on top. 14. Tighten the front thigh muscles of your top leg to keep your knee straight. 15. Keep your hip and your leg straight in line with the rest of your body, and keep your knee pointing forward. Do not drop your hip back. 16. Lift your top leg straight up toward the ceiling, about 12 inches off the floor. Hold for about 6 seconds, then slowly lower your leg. 17. Repeat 8 to 12 times. 18. Switch legs and repeat steps 1 through 5, even if only one hip is sore. Straight-leg raises to the inside    1. Lie on your side with your affected hip on the floor. 2. You can either prop up your other leg on a chair, or you can bend that knee and put that foot in front of your other knee. Do not drop your hip back. 3. Tighten the muscles on the front thigh of your bottom leg to straighten that knee. 4. Keep your kneecap pointing forward and your leg straight, and lift your bottom leg up toward the ceiling about 6 inches. Hold for about 6 seconds, then lower slowly. 5. Repeat 8 to 12 times. 6. Switch legs and repeat steps 1 through 5, even if only one hip is sore. Hip hike    1. Stand sideways on the bottom step of a staircase, and hold on to the banister or wall. 2. Keeping both knees straight, lift your good leg off the step and let it hang down. Then hike your good hip up to the same level as your affected hip or a little higher. 3. Repeat 8 to 12 times. 4. Switch legs and repeat steps 1 through 3, even if only one hip is sore. Bridging    1. Lie on your back with both knees bent. Your knees should be bent about 90 degrees.   2. Then push your feet into the floor, squeeze your buttocks, and lift your hips off the floor until your shoulders, hips, and knees are all in a straight line.  3. Hold for about 6 seconds as you continue to breathe normally, and then slowly lower your hips back down to the floor and rest for up to 10 seconds. 4. Repeat 8 to 12 times. Hamstring stretch (lying down)    1. Lie flat on your back with your legs straight. If you feel discomfort in your back, place a small towel roll under your lower back. 2. Holding the back of your affected leg, lift your leg straight up and toward your body until you feel a stretch at the back of your thigh. 3. Hold the stretch for at least 30 seconds. 4. Repeat 2 to 4 times. 5. Switch legs and repeat steps 1 through 4, even if only one hip is sore. Standing quadriceps stretch    1. If you are not steady on your feet, hold on to a chair, counter, or wall. You can also lie on your stomach or your side to do this exercise. 2. Bend the knee of the leg you want to stretch, and reach behind you to grab the front of your foot or ankle with the hand on the same side. For example, if you are stretching your right leg, use your right hand. 3. Keeping your knees next to each other, pull your foot toward your buttock until you feel a gentle stretch across the front of your hip and down the front of your thigh. Your knee should be pointed directly to the ground, and not out to the side. 4. Hold the stretch for at least 15 to 30 seconds. 5. Repeat 2 to 4 times. 6. Switch legs and repeat steps 1 through 5, even if only one hip is sore. Hip rotator stretch    1. Lie on your back with both knees bent and your feet flat on the floor. 2. Put the ankle of your affected leg on your opposite thigh near your knee. 3. Use your hand to gently push your knee away from your body until you feel a gentle stretch around your hip. 4. Hold the stretch for 15 to 30 seconds. 5. Repeat 2 to 4 times. 6. Repeat steps 1 through 5, but this time use your hand to gently pull your knee toward your opposite shoulder.   7. Switch legs and repeat steps 1 through 6, even if only one hip is sore. Knee-to-chest    1. Lie on your back with your knees bent and your feet flat on the floor. 2. Bring your affected leg to your chest, keeping the other foot flat on the floor (or keeping the other leg straight, whichever feels better on your lower back). 3. Keep your lower back pressed to the floor. Hold for at least 15 to 30 seconds. 4. Relax, and lower the knee to the starting position. 5. Repeat 2 to 4 times. 6. Switch legs and repeat steps 1 through 5, even if only one hip is sore. 7. To get more stretch, put your other leg flat on the floor while pulling your knee to your chest.    Clamshell    1. Lie on your side, with your affected hip on top. Keep your feet and knees together and your knees bent. 2. Raise your top knee, but keep your feet together. Do not let your hips roll back. Your legs should open up like a clamshell. 3. Hold for 6 seconds. 4. Slowly lower your knee back down. Rest for 10 seconds. 5. Repeat 8 to 12 times. 6. Switch legs and repeat steps 1 through 5, even if only one hip is sore. Follow-up care is a key part of your treatment and safety. Be sure to make and go to all appointments, and call your doctor if you are having problems. It's also a good idea to know your test results and keep a list of the medicines you take. Where can you learn more? Go to http://kelsey-amy.info/. Enter Q901 in the search box to learn more about \"Hip Arthritis: Exercises. \"  Current as of: September 20, 2018  Content Version: 12.1  © 6584-4259 Healthwise, Incorporated. Care instructions adapted under license by [employer] (which disclaims liability or warranty for this information). If you have questions about a medical condition or this instruction, always ask your healthcare professional. [address] any warranty or liability for your use of this information.

## 2019-07-29 NOTE — PROGRESS NOTES
CC: Hip Pain      HPI:    She is a 53 y.o. female who presents for evaluation of right side pain lower right back at [facility] joint area. Sometimes hip flexors are tight  Onset 2 years ago   Pain is dull  Cannot run anymore  When getting up having difficulty straightening all the way. Has seen chiropractor which helps for a little while and the pain comes back     No weakness    ROS:  Constitutional: negative for fevers, chills, anorexia and weight loss      Past Medical History:   Diagnosis Date    Headache     Headache(784.0)     migraine    Migraine 2018    TMJ (dislocation of temporomandibular joint)        Current Outpatient Medications on File Prior to Visit   Medication Sig Dispense Refill    SUMAtriptan (IMITREX) 50 mg tablet TAKE ONE TABLET BY MOUTH AT ONSET OF HEADACHE; MAY REPEAT ONE TABLET IN 2 HOURS IF NEEDED.  MG IN 24 HOURS 9 Tab 0    ondansetron (ZOFRAN ODT) 4 mg disintegrating tablet DISSOLVE ONE TABLET BY MOUTH EVERY 8 HOURS AS NEEDED FOR NAUSEA 9 Tab 0    buPROPion XL (WELLBUTRIN XL) 150 mg tablet TAKE ONE TABLET BY MOUTH EVERY MORNING 30 Tab 1    senna (SENNA) 8.6 mg tablet Take 1 Tab by mouth daily. 15 Tab 0    vilazodone (VIIBRYD) 20 mg tab tablet Take 1 Tab by mouth daily. 30 Tab 3     No current facility-administered medications on file prior to visit.         Past Surgical History:   Procedure Laterality Date    HX CYST REMOVAL  2003    HX ORTHOPAEDIC      ganglion cyst removal       Family History   Problem Relation Age of Onset    Stroke Paternal Grandmother     Alzheimer Paternal Grandmother     Cancer Paternal Grandfather         lung    Hypertension Mother     Glaucoma Mother     Cancer Father     Hypertension Brother     Cancer Paternal Uncle         pancreatic     Reviewed and no changes     Social History     Socioeconomic History    Marital status:      Spouse name: Not on file    Number of children: Not on file    Years of education: Not on file   [facility] Highest education level: Not on file   Occupational History    Occupation: marketing   Social Needs    Financial resource strain: Not on file    Food insecurity:     Worry: Not on file     Inability: Not on file    Transportation needs:     Medical: Not on file     Non-medical: Not on file   Tobacco Use    Smoking status: Never Smoker    Smokeless tobacco: Never Used   Substance and Sexual Activity    Alcohol use: Yes     Alcohol/week: 0.0 standard drinks     Types: 5 - 6 Glasses of wine per week     Comment: 1 times a week    Drug use: Yes     Types: Prescription    Sexual activity: Yes     Partners: Male     Birth control/protection: Rhythm   Lifestyle    Physical activity:     Days per week: 2 days     Minutes per session: 30 min    Stress:  To some extent   Relationships    Social connections:     Talks on phone: Not on file     Gets together: Not on file     Attends Congregation service: Not on file     Active member of club or organization: Not on file     Attends meetings of clubs or organizations: Not on file     Relationship status: Not on file    Intimate partner violence:     Fear of current or ex partner: Not on file     Emotionally abused: Not on file     Physically abused: Not on file     Forced sexual activity: Not on file   Other Topics Concern     Service Not Asked    Blood Transfusions Not Asked    Caffeine Concern Not Asked    Occupational Exposure Not Asked    Hobby Hazards Not Asked    Sleep Concern No    Stress Concern Yes    Weight Concern No    Special Diet Yes    Back Care Not Asked    Exercise Yes    Bike Helmet Not Asked    Seat Belt Yes    Self-Exams Yes   Social History Narrative    Not on file            Visit Vitals  /76 (BP 1 Location: Right arm, BP Patient Position: Sitting)   Pulse 69   Temp 97.8 °F (36.6 °C) (Oral)   Resp 18   Ht 5' 6\" (1.676 m)   Wt 179 lb (81.2 kg)   SpO2 99%   BMI 28.89 kg/m²       Physical Examination:   General - Well appearing female  HEENT - PERRL, TM no erythema/opacification, normal nasal turbinates, oropharynx no erythema or exudate, MMM  Neck - supple, no bruits, no TMG, no LAD  Pulm - clear to auscultation bilaterally  Cardio - RRR, normal S1 S2, no murmur gallops or rubs  Abd - soft, nontender, no masses, no HSM  Extrem - no edema, +2 distal pulses  Psych - normal affect, appropriate mood    Lab Results   Component Value Date/Time    WBC 5.9 10/18/2018 11:02 AM    HGB 13.6 10/18/2018 11:02 AM    HCT 41.5 10/18/2018 11:02 AM    PLATELET 793 48/98/1258 11:02 AM    MCV 84.7 10/18/2018 11:02 AM     Lab Results   Component Value Date/Time    Sodium 137 10/18/2018 11:02 AM    Potassium 4.1 10/18/2018 11:02 AM    Chloride 103 10/18/2018 11:02 AM    CO2 30 10/18/2018 11:02 AM    Anion gap 4 (L) 10/18/2018 11:02 AM    Glucose 105 (H) 10/18/2018 11:02 AM    BUN 9 10/18/2018 11:02 AM    Creatinine 0.86 10/18/2018 11:02 AM    BUN/Creatinine ratio 10 (L) 10/18/2018 11:02 AM    GFR est AA >60 10/18/2018 11:02 AM    GFR est non-AA >60 10/18/2018 11:02 AM    Calcium 8.9 10/18/2018 11:02 AM     Lab Results   Component Value Date/Time    Cholesterol, total 160 05/20/2016 08:07 AM    HDL Cholesterol 105 05/20/2016 08:07 AM    LDL, calculated 43 05/20/2016 08:07 AM    VLDL, calculated 12 05/20/2016 08:07 AM    Triglyceride 59 05/20/2016 08:07 AM     Lab Results   Component Value Date/Time    TSH 2.430 05/20/2016 08:07 AM     No results found for: PSA, Lucrecia Wong, [id number], [id number], PSALT  Lab Results   Component Value Date/Time    Hemoglobin A1c 5.6 05/20/2016 08:07 AM     No results found for: Karlos Saravia VD3RIA    Lab Results   Component Value Date/Time    ALT (SGPT) 20 10/18/2018 11:02 AM    AST (SGOT) 27 10/18/2018 11:02 AM    Alk. phosphatase 72 10/18/2018 11:02 AM    Bilirubin, total 0.5 10/18/2018 11:02 AM           Assessment/Plan:    1. Right hip pain  2.  Chronic right-sided low back pain with right-sided sciatica  Suspect symptoms are due to sciatic nerve, will obtain x rays since symptoms are present for 2 years. Referral to PT and in no improvement will do further imaging and ortho referral  - discussed weight loss also  - XR HIP RT W WO PELV MIN 4 VWS; Future  - XR SPINE LUMB 2 OR 3 V; Future  - REFERRAL TO PHYSICAL THERAPY          Follow-up and Dispositions    · Return if symptoms worsen or fail to improve.          Nelson De Leon MD

## 2019-07-29 NOTE — PROGRESS NOTES
Reviewed record in preparation for visit and have obtained necessary documentation. Identified pt with two pt identifiers(name and ). Chief Complaint   Patient presents with    Hip Pain       Health Maintenance Due   Topic Date Due    DTaP/Tdap/Td  (1 - Tdap) 1992    Pap Test  2019       Ms. Lila Blanca has a reminder for a \"due or due soon\" health maintenance. I have asked that she discuss this further with her primary care provider for follow-up on this health maintenance. Coordination of Care Questionnaire:  :     1) Have you been to an emergency room, urgent care clinic since your last visit? no   Hospitalized since your last visit? no             2) Have you seen or consulted any other health care providers outside of [address] [place] [address] since your last visit? no  (Include any pap smears or colon screenings in this section.)    3) In the event something were to happen to you and you were unable to speak on your behalf, do you have an Advance Directive/ Living Will in place stating your wishes? NO    Do you have an Advance Directive on file? no    4) Are you interested in receiving information on Advance Directives? NO    Patient is accompanied by self I have received verbal consent from Gideon Riley to discuss any/all medical information while they are present in the room.

## 2019-08-12 ENCOUNTER — HOSPITAL ENCOUNTER (OUTPATIENT)
Dept: PHYSICAL THERAPY | Age: 49
Discharge: HOME | End: 2019-08-12
Payer: COMMERCIAL

## 2019-08-12 PROCEDURE — 97162 PT EVAL MOD COMPLEX 30 MIN: CPT | Performed by: PHYSICAL THERAPIST

## 2019-08-12 PROCEDURE — 97110 THERAPEUTIC EXERCISES: CPT | Performed by: PHYSICAL THERAPIST

## 2019-08-12 NOTE — PROGRESS NOTES
[address] [address] (MOB IV), [address] [facility]  [place],  [address]  Phone: [phone] Fax: [phone]    Plan of Care/Statement of Necessity for Physical Therapy Services  2-15    Patient name: Katie Gray  : 3/27/1885  Provider#: [id number]  Referral source: Kenna John MD      Medical/Treatment Diagnosis: Lower back pain [M54.5]  Right hip joint effusion [M25.451]     Prior Hospitalization: see medical history     Comorbidities: back pain, depression, headaches  Prior Level of Function: 20min of exercise seldom or never  Medications: Verified on Patient Summary List    Start of Care: 20      Onset Date: chronic       The Plan of Care and following information is based on the information from the initial evaluation. Assessment/ key information: The patient presents with chronic right lumbar and lateral hip pain that is consistent with hip bursitis (due to significant tenderness along the greater trochanter) and SI joint/lumbar DDD. She has decreased glute and core strength that exacerbate her symptoms. Recent xrays were negative for any significant bony abnormalities in the hip or the lumbar spine (very minimal arthritis at L5/S1). The patient will benefit from guided therapeutic interventions such as therex for strengthening and neuromuscular re-eduction, manual techniques for joint mobility and soft tissue extensibility, and modalities for pain management in order to improve functional mobility with daily activities. Evaluation Complexity History MEDIUM  Complexity : 1-2 comorbidities / personal factors will impact the outcome/ POC ; Examination MEDIUM Complexity : 3 Standardized tests and measures addressing body structure, function, activity limitation and / or participation in recreation  ;Presentation MEDIUM Complexity : Evolving with changing characteristics  ; Clinical Decision Making MEDIUM Complexity : FOTO score of 26-74  Overall Complexity Rating: MEDIUM    Problem List: pain affecting function, decrease ROM, decrease strength, edema affecting function, impaired gait/ balance, decrease ADL/ functional abilitiies, decrease activity tolerance, decrease flexibility/ joint mobility and decrease transfer abilities   Treatment Plan may include any combination of the following: Therapeutic exercise, Therapeutic activities, Neuromuscular re-education, Physical agent/modality, Gait/balance training, Manual therapy, Patient education, Self Care training, Functional mobility training, Home safety training and Stair training  Patient / Family readiness to learn indicated by: asking questions, trying to perform skills and interest  Persons(s) to be included in education: patient (P)  Barriers to Learning/Limitations: None  Patient Goal (s): Marbella Morelia relief  Patient Self Reported Health Status: good  Rehabilitation Potential: good    Short Term Goals: To be accomplished in 2 treatments:                         1.) The patient will be independent with their HEP consistently for at least one week. Long Term Goals: To be accomplished in 16 treatments:                         1.) The patient will have at most 2/10 pain with daily activities for at least one week. 2.) The patient will be able to ambulate for at least 2 miles without having to change positions due to pain. 3.) The patient will improve their FOTO score from 61 to at least 66 to show improvements in functional mobility. Frequency / Duration: Patient to be seen 2 times per week for 16 treatments. Patient/ Caregiver education and instruction: self care, activity modification and exercises    [x]  Plan of care has been reviewed with RUSS Copeland, PT 8/12/2019     ________________________________________________________________________    I certify that the above Therapy Services are being furnished while the patient is under my care.  I agree with the treatment plan and certify that this therapy is necessary.     [de-identified] Signature:____________________  Date:____________Time: _________

## 2019-08-12 NOTE — PROGRESS NOTES
PT INITIAL EVALUATION NOTE 2-15    Patient Name: Teresa Canada  ORPL:[id number]  : 1970  [x]  Patient  Verified  Payor: Sergio Raygoza / Plan: [employer] / Product Type: HMO /    In time: 10:40am  Out time: 11:35am  Total Treatment Time (min): 55  Visit #: 1     Treatment Area: Lower back pain [M54.5]  Right hip joint effusion [M25.451]    SUBJECTIVE  Pain Level (0-10 scale): 2 (2-8/10)  Any medication changes, allergies to medications, adverse drug reactions, diagnosis change, or new procedure performed?: [] No    [x] Yes (see summary sheet for update)  Subjective: The patient reports that she's been having right lumbar/SI joint pain since 2017, but it's been getting worse. It always hurts on the right side, whether it's her head/shoulder/neck/hip/back. She goes for 2 mile walks in the morning and it will usually hurt midway through. The chiropractor says she's out of alignment and corrects her, which helps short term, and yoga helps short term. It will sometimes hurt in the morning. OBJECTIVE    Posture:  Scapular protraction bilaterally  Other Observations:  Possible left anteriorly rotated innominate; possible right elevated illiac crest in standing  Gait and Functional Mobility:  Slight overpronation bilaterally  Palpation: tender to palpate right SI joint/upper glute and right lateral hip/greater trochanter        Lumbar AROM:          R  L    Flexion    90%      Extension   Limited, p!  On right SI      Side Bending   Limited bilaterally      Rotation   WFL          LOWER QUARTER   MUSCLE STRENGTH  KEY       R  L  0 - No Contraction  L1, L2 Psoas  5  5  1 - Trace   L3 Quads  5  5  2 - Poor   L4 Tib Ant  5  5  3 - Fair    L5 EHL  5  5  4 - Good   S1 Peroneals  5  5  5 - Normal   S2 Hams  5  5    Flexibility: tight hip flexors bilaterally  Mobility Assessment: hypomobile lumbar      MMT:               HIP Abd: 3+/5 on right; 4-/5 on left  Neurological: Reflexes / Sensations: nt  Special Tests: H.S. SLR: + for hip/lumbar pain   Long Sit: even supine; left longer with long sitting    25 min Therapeutic Exercise:  [x] See flow sheet :   Rationale: increase ROM, increase strength and improve coordination to improve the patients ability to perform daily activities. With   [x] TE   [] TA   [] neuro   [] other: Patient Education: [x] Review HEP    [x] Progressed/Changed HEP based on:   [x] positioning   [x] body mechanics   [] transfers   [] heat/ice application    [] other:        Other Objective/Functional Measures:  FOTO: 61    Pain Level (0-10 scale) post treatment: 2      ASSESSMENT:      [x]  See Plan of [facility], PT 8/12/2019

## 2019-08-14 ENCOUNTER — HOSPITAL ENCOUNTER (OUTPATIENT)
Dept: PHYSICAL THERAPY | Age: 49
Discharge: HOME | End: 2019-08-14
Payer: COMMERCIAL

## 2019-08-14 PROCEDURE — 97110 THERAPEUTIC EXERCISES: CPT | Performed by: PHYSICAL THERAPIST

## 2019-08-14 NOTE — PROGRESS NOTES
PT DAILY TREATMENT NOTE 2-15    Patient Name: Katie Gray  LSUB:  : 1970  [x]  Patient  Verified  Payor: David Garcia / Plan: [employer]MO / Product Type: HMO /    In time: 6:10pm  Out time: 7:10pm  Total Treatment Time (min): 60  Visit #:  2    Treatment Area: Lower back pain [M54.5]  Right hip joint effusion [M25.451]    SUBJECTIVE  Pain Level (0-10 scale): 2  Any medication changes, allergies to medications, adverse drug reactions, diagnosis change, or new procedure performed?: [x] No    [] Yes (see summary sheet for update)  Subjective functional status/changes:   [] No changes reported  The patient reports that it feels about the same. OBJECTIVE    60 min Therapeutic Exercise:  [x] See flow sheet :   Rationale: increase ROM, increase strength and improve coordination to improve the patients ability to perform daily activities. With   [x] TE   [] TA   [] neuro   [] other: Patient Education: [x] Review HEP    [x] Progressed/Changed HEP based on:   [x] positioning   [x] body mechanics   [] transfers   [] heat/ice application    [] other:      Other Objective/Functional Measures: Pt. Tolerated therex well     Pain Level (0-10 scale) post treatment: 4    ASSESSMENT/Changes in Function:   The patient progressed with new dynamic strengthening exercises as tolerated. Patient will continue to benefit from skilled PT services to modify and progress therapeutic interventions, address functional mobility deficits, address ROM deficits, address strength deficits, analyze and address soft tissue restrictions, analyze and cue movement patterns, analyze and modify body mechanics/ergonomics, assess and modify postural abnormalities, address imbalance/dizziness and instruct in home and community integration to attain remaining goals. [x]  See Plan of Care  []  See progress note/recertification  []  See Discharge Summary         Progress towards goals / Updated goals:   The patient is progressing towards goals.      PLAN  [x]  Upgrade activities as tolerated     [x]  Continue plan of care  [x]  Update interventions per flow sheet       []  Discharge due to:_  []  Other:_      Earnest Copeland, PT 8/14/2019

## 2019-08-20 ENCOUNTER — HOSPITAL ENCOUNTER (OUTPATIENT)
Dept: PHYSICAL THERAPY | Age: 49
Discharge: HOME | End: 2019-08-20
Payer: COMMERCIAL

## 2019-08-20 PROCEDURE — 97110 THERAPEUTIC EXERCISES: CPT | Performed by: PHYSICAL THERAPIST

## 2019-08-20 NOTE — PROGRESS NOTES
PT DAILY TREATMENT NOTE 2-15    Patient Name: Masood Valadez  VLBC:  : 1970  [x]  Patient  Verified  Payor: Efren Urrutia / Plan: [employer] HMO / Product Type: HMO /    In time: 7:04am  Out time: 8:06am  Total Treatment Time (min): 62  Visit #:  3    Treatment Area: Lower back pain [M54.5]  Right hip joint effusion [M25.451]    SUBJECTIVE  Pain Level (0-10 scale): 2  Any medication changes, allergies to medications, adverse drug reactions, diagnosis change, or new procedure performed?: [x] No    [] Yes (see summary sheet for update)  Subjective functional status/changes:   [] No changes reported  The patient reports that she felt okay over the weekend. OBJECTIVE    62 min Therapeutic Exercise:  [x] See flow sheet :   Rationale: increase ROM, increase strength and improve coordination to improve the patients ability to perform daily activities. With   [x] TE   [] TA   [] neuro   [] other: Patient Education: [x] Review HEP    [x] Progressed/Changed HEP based on:   [x] positioning   [x] body mechanics   [] transfers   [] heat/ice application    [] other:      Other Objective/Functional Measures: Pt. Tolerated therex well     Pain Level (0-10 scale) post treatment: 2    ASSESSMENT/Changes in Function:   The patient progressed with new dynamic strengthening as tolerated. Patient will continue to benefit from skilled PT services to modify and progress therapeutic interventions, address functional mobility deficits, address ROM deficits, address strength deficits, analyze and address soft tissue restrictions, analyze and cue movement patterns, analyze and modify body mechanics/ergonomics, assess and modify postural abnormalities, address imbalance/dizziness and instruct in home and community integration to attain remaining goals. [x]  See Plan of Care  []  See progress note/recertification  []  See Discharge Summary         Progress towards goals / Updated goals:   The patient is progressing towards goals.      PLAN  [x]  Upgrade activities as tolerated     [x]  Continue plan of care  [x]  Update interventions per flow sheet       []  Discharge due to:_  []  Other:_      Jeovany Chan, PT 8/20/2019

## 2019-08-22 ENCOUNTER — HOSPITAL ENCOUNTER (OUTPATIENT)
Dept: PHYSICAL THERAPY | Age: 49
Discharge: HOME | End: 2019-08-22
Payer: COMMERCIAL

## 2019-08-22 PROCEDURE — 97110 THERAPEUTIC EXERCISES: CPT | Performed by: PHYSICAL THERAPIST

## 2019-08-22 NOTE — PROGRESS NOTES
PT DAILY TREATMENT NOTE 2-15    Patient Name: Peyman Brock  OJKC:  : 1970  [x]  Patient  Verified  Payor: Heather Muñoz / Plan: [id number] / Product Type: HMO /    In time: 10:05am  Out time: 11:02am  Total Treatment Time (min): 62  Visit #:  4    Treatment Area: Lower back pain [M54.5]  Right hip joint effusion [M25.451]    SUBJECTIVE  Pain Level (0-10 scale): 3  Any medication changes, allergies to medications, adverse drug reactions, diagnosis change, or new procedure performed?: [x] No    [] Yes (see summary sheet for update)  Subjective functional status/changes:   [] No changes reported  The patient reports that she had left hip flexor discomfort after last visit. OBJECTIVE    57 min Therapeutic Exercise:  [x] See flow sheet :   Rationale: increase ROM, increase strength and improve coordination to improve the patients ability to perform daily activities. With   [x] TE   [] TA   [] neuro   [] other: Patient Education: [x] Review HEP    [x] Progressed/Changed HEP based on:   [x] positioning   [x] body mechanics   [] transfers   [] heat/ice application    [] other:      Other Objective/Functional Measures: Pt. Tolerated therex well     Pain Level (0-10 scale) post treatment: 3    ASSESSMENT/Changes in Function:   The patient is progressing well with dynamic hip strengthening as tolerated. Patient will continue to benefit from skilled PT services to modify and progress therapeutic interventions, address functional mobility deficits, address ROM deficits, address strength deficits, analyze and address soft tissue restrictions, analyze and cue movement patterns, analyze and modify body mechanics/ergonomics, assess and modify postural abnormalities, address imbalance/dizziness and instruct in home and community integration to attain remaining goals.      [x]  See Plan of Care  []  See progress note/recertification  []  See Discharge Summary         Progress towards goals / Updated goals:  The patient is progressing towards goals.      PLAN  [x]  Upgrade activities as tolerated     [x]  Continue plan of care  [x]  Update interventions per flow sheet       []  Discharge due to:_  []  Other:_      João Chandler, PT 8/22/2019

## 2019-08-29 ENCOUNTER — HOSPITAL ENCOUNTER (OUTPATIENT)
Dept: PHYSICAL THERAPY | Age: 49
Discharge: HOME | End: 2019-08-29
Payer: COMMERCIAL

## 2019-08-29 PROCEDURE — 97110 THERAPEUTIC EXERCISES: CPT | Performed by: PHYSICAL THERAPIST

## 2019-08-29 NOTE — PROGRESS NOTES
PT DAILY TREATMENT NOTE 2-15    Patient Name: Shena Muñoz  FOIM:[id number]  : 1970  [x]  Patient  Verified  Payor: Shawna Section / Plan: Harjit [employer] HMO / Product Type: HMO /    In time: 10:02am  Out time: 10:57am  Total Treatment Time (min): 55  Visit #:  5    Treatment Area: Lower back pain [M54.5]  Right hip joint effusion [M25.451]    SUBJECTIVE  Pain Level (0-10 scale): 1  Any medication changes, allergies to medications, adverse drug reactions, diagnosis change, or new procedure performed?: [x] No    [] Yes (see summary sheet for update)  Subjective functional status/changes:   [] No changes reported  The patient reports that she had to run for a few minutes yesterday due to it raining while she was walking, and it didn't feel bad; she can also lay on her right side now. OBJECTIVE    55 min Therapeutic Exercise:  [x] See flow sheet :   Rationale: increase ROM, increase strength and improve coordination to improve the patients ability to perform daily activities. With   [x] TE   [] TA   [] neuro   [] other: Patient Education: [x] Review HEP    [x] Progressed/Changed HEP based on:   [x] positioning   [x] body mechanics   [] transfers   [] heat/ice application    [] other:      Other Objective/Functional Measures: Pt. Tolerated therex well     Pain Level (0-10 scale) post treatment: 1    ASSESSMENT/Changes in Function:   The patient is progressed with more dynamic functional strengthening. Patient will continue to benefit from skilled PT services to modify and progress therapeutic interventions, address functional mobility deficits, address ROM deficits, address strength deficits, analyze and address soft tissue restrictions, analyze and cue movement patterns, analyze and modify body mechanics/ergonomics, assess and modify postural abnormalities, address imbalance/dizziness and instruct in home and community integration to attain remaining goals.      [x]  See Plan of Care  []  See progress note/recertification  []  See Discharge Summary         Progress towards goals / Updated goals: The patient is progressing towards goals.      PLAN  [x]  Upgrade activities as tolerated     [x]  Continue plan of care  [x]  Update interventions per flow sheet       []  Discharge due to:_  []  Other:_      Lucian Scott, PT 8/29/2019

## 2019-09-03 DIAGNOSIS — G43.909 MIGRAINE WITHOUT STATUS MIGRAINOSUS, NOT INTRACTABLE, UNSPECIFIED MIGRAINE TYPE: ICD-10-CM

## 2019-09-03 RX ORDER — SUMATRIPTAN 50 MG/1
TABLET, FILM COATED ORAL
Qty: 9 TAB | Refills: 0 | Status: SHIPPED | OUTPATIENT
Start: 2019-09-03 | End: 2019-11-21 | Stop reason: SDUPTHER

## 2019-09-05 ENCOUNTER — HOSPITAL ENCOUNTER (OUTPATIENT)
Dept: PHYSICAL THERAPY | Age: 49
Discharge: HOME | End: 2019-09-05
Payer: COMMERCIAL

## 2019-09-05 PROCEDURE — 97110 THERAPEUTIC EXERCISES: CPT | Performed by: PHYSICAL THERAPIST

## 2019-09-05 NOTE — PROGRESS NOTES
PT DAILY TREATMENT NOTE 2-15    Patient Name: Ephraim Hernandez  QAIP:[id number]  : 1970  [x]  Patient  Verified  Payor: Jossie Curry / Plan: [id number] [employer] / Product Type: HMO /    In time: 10:08am  Out time: 11:06am   Total Treatment Time (min): 58   Visit #:  6    Treatment Area: Lower back pain [M54.5]  Right hip joint effusion [M25.451]    SUBJECTIVE  Pain Level (0-10 scale): 3   Any medication changes, allergies to medications, adverse drug reactions, diagnosis change, or new procedure performed?: [x] No    [] Yes (see summary sheet for update)  Subjective functional status/changes:   [] No changes reported  The patient reports that her right hip has been tingling in the front for a few days and her neck/shoulder blades have been knotted since last time. OBJECTIVE    58  min Therapeutic Exercise:  [x] See flow sheet :   Rationale: increase ROM, increase strength and improve coordination to improve the patients ability to perform daily activities. With   [x] TE   [] TA   [] neuro   [] other: Patient Education: [x] Review HEP    [x] Progressed/Changed HEP based on:   [x] positioning   [x] body mechanics   [] transfers   [] heat/ice application    [] other:      Other Objective/Functional Measures: Pt. Tolerated therex well      Pain Level (0-10 scale) post treatment: 0     ASSESSMENT/Changes in Function:   The patient progressed with modified exercises to limited hip flexor activation and SL weightbearing. Patient will continue to benefit from skilled PT services to modify and progress therapeutic interventions, address functional mobility deficits, address ROM deficits, address strength deficits, analyze and address soft tissue restrictions, analyze and cue movement patterns, analyze and modify body mechanics/ergonomics, assess and modify postural abnormalities, address imbalance/dizziness and instruct in home and community integration to attain remaining goals.      [x]  See Plan of Care  [] See progress note/recertification  []  See Discharge Summary         Progress towards goals / Updated goals: The patient is progressing towards goals.      PLAN  [x]  Upgrade activities as tolerated     [x]  Continue plan of care  [x]  Update interventions per flow sheet       []  Discharge due to:_  []  Other:_      Troy Adams, PT 9/5/2019

## 2019-09-12 ENCOUNTER — HOSPITAL ENCOUNTER (OUTPATIENT)
Dept: PHYSICAL THERAPY | Age: 49
Discharge: HOME | End: 2019-09-12
Payer: COMMERCIAL

## 2019-09-12 PROCEDURE — 97110 THERAPEUTIC EXERCISES: CPT | Performed by: PHYSICAL THERAPIST

## 2019-09-12 NOTE — ANCILLARY DISCHARGE INSTRUCTIONS
[facility]  [address] ([place] [place]), [address] [facility],  [address]  Phone: [phone] Fax: [phone]    Discharge Summary  2-15    Patient name: Mayela Garcia  :   Provider#: [id number]  Referral source: Himanshu Segovia MD      Medical/Treatment Diagnosis: Lower back pain [M54.5]  Right hip joint effusion [M25.451]     Prior Hospitalization: see medical history     Comorbidities: See Plan of Care  Prior Level of Function:See Plan of Care  Medications: Verified on Patient Summary List    Start of Care: 19      Onset Date:chronic   Visits from Start of Care: 7     Missed Visits: 0  Reporting Period : 19 to 19      ASSESSMENT/SUMMARY OF CARE: The patient has progressed well with improved glute, lumbar, and core strength and mobility. She continues to have occasional right hip flexor discomfort on the right, but decreases pain with glute activation. She has a very dynamic and advanced strengthening HEP that she will continue to utilize to progress and maintain improvements made thus far. If she plateaus in 2-3 months, she will need a new script to return to therapy, which was discussed with the patient. Short Term Goals: To be accomplished in 2 treatments:                         0.) The patient will be independent with their HEP consistently for at least one week. - MET  Long Term Goals: To be accomplished in 16 treatments:                         6.) The patient will have at most 2/10 pain with daily activities for at least one week. - MET                         8.) The patient will be able to ambulate for at least 2 miles without having to change positions due to pain. - MET                         9.) The patient will improve their FOTO score from 61 to at least 66 to show improvements in functional mobility.- MET (70 today)        RECOMMENDATIONS:  [x]Discontinue therapy: [x]Patient has reached or is progressing toward set goals      []Patient is non-compliant or has abdicated      []Due to lack of appreciable progress towards set goals      []Other    Burgess Worrell, PT 9/12/2019

## 2019-09-12 NOTE — PROGRESS NOTES
PT DAILY TREATMENT NOTE 2-15    Patient Name: Sanjeev Vaughan  RVPJ:[id number]  : 1970  [x]  Patient  Verified  Payor: Michael Tatiana / Plan: [id number] / Product Type: HMO /    In time: 10:02am  Out time: 11:10am   Total Treatment Time (min): 68   Visit #:  7    Treatment Area: Lower back pain [M54.5]  Right hip joint effusion [M25.451]    SUBJECTIVE  Pain Level (0-10 scale): 2 (hip flexor)  Any medication changes, allergies to medications, adverse drug reactions, diagnosis change, or new procedure performed?: [x] No    [] Yes (see summary sheet for update)  Subjective functional status/changes:   [] No changes reported  The patient reports that her pain and discomfort isn't like it used to be; she doesn't feel like she constantly needs a correction/adjustment, but her hip flexor will get irritated still; in general it will loosen up quicker in the mornings. OBJECTIVE    68  min Therapeutic Exercise:  [x] See flow sheet :   Rationale: increase ROM, increase strength and improve coordination to improve the patients ability to perform daily activities. With   [x] TE   [] TA   [] neuro   [] other: Patient Education: [x] Review HEP    [x] Progressed/Changed HEP based on:   [x] positioning   [x] body mechanics   [] transfers   [] heat/ice application    [] other:      Other Objective/Functional Measures: FOTO= 70 (61 at eval)     Pain Level (0-10 scale) post treatment: 1 (hip flexor)    ASSESSMENT/Changes in Function:   The patient progressed with more advanced TRX exercises for home and will be discharged to progress independently. []  See Plan of Care  []  See progress note/recertification  [x]  See Discharge Summary          Progress towards goals / Updated goals: The patient has MET or is progressing towards most goals. PLAN  []  Upgrade activities as tolerated     []  Continue plan of care  []  Update interventions per flow sheet       [x]  Discharge due to: Pt.  Is independent with her HEP.   []  Other:_      Joie Milan, PT 9/12/2019

## 2019-09-19 ENCOUNTER — APPOINTMENT (OUTPATIENT)
Dept: PHYSICAL THERAPY | Age: 49
End: 2019-09-19
Payer: COMMERCIAL

## 2019-09-26 ENCOUNTER — APPOINTMENT (OUTPATIENT)
Dept: PHYSICAL THERAPY | Age: 49
End: 2019-09-26
Payer: COMMERCIAL

## 2019-11-21 DIAGNOSIS — G43.909 MIGRAINE WITHOUT STATUS MIGRAINOSUS, NOT INTRACTABLE, UNSPECIFIED MIGRAINE TYPE: ICD-10-CM

## 2019-11-23 RX ORDER — SUMATRIPTAN 50 MG/1
TABLET, FILM COATED ORAL
Qty: 9 TAB | Refills: 0 | Status: SHIPPED | OUTPATIENT
Start: 2019-11-23 | End: 2019-12-31

## 2019-12-31 DIAGNOSIS — R11.2 NAUSEA AND VOMITING, INTRACTABILITY OF VOMITING NOT SPECIFIED, UNSPECIFIED VOMITING TYPE: ICD-10-CM

## 2019-12-31 DIAGNOSIS — G43.909 MIGRAINE WITHOUT STATUS MIGRAINOSUS, NOT INTRACTABLE, UNSPECIFIED MIGRAINE TYPE: ICD-10-CM

## 2019-12-31 RX ORDER — SUMATRIPTAN 50 MG/1
TABLET, FILM COATED ORAL
Qty: 9 TAB | Refills: 0 | Status: SHIPPED | OUTPATIENT
Start: 2019-12-31 | End: 2020-02-21

## 2019-12-31 RX ORDER — ONDANSETRON 4 MG/1
TABLET, ORALLY DISINTEGRATING ORAL
Qty: 9 TAB | Refills: 0 | Status: SHIPPED | OUTPATIENT
Start: 2019-12-31 | End: 2020-07-30

## 2020-02-21 DIAGNOSIS — G43.909 MIGRAINE WITHOUT STATUS MIGRAINOSUS, NOT INTRACTABLE, UNSPECIFIED MIGRAINE TYPE: ICD-10-CM

## 2020-02-21 RX ORDER — SUMATRIPTAN 50 MG/1
TABLET, FILM COATED ORAL
Qty: 9 TAB | Refills: 0 | Status: SHIPPED | OUTPATIENT
Start: 2020-02-21 | End: 2020-04-07

## 2020-03-16 ENCOUNTER — TELEPHONE (OUTPATIENT)
Dept: INTERNAL MEDICINE CLINIC | Age: 50
End: 2020-03-16

## 2020-03-16 NOTE — TELEPHONE ENCOUNTER
Called, spoke with Pt. Two pt identifiers confirmed. Pt informed she is only taking Wellbutrin and not Viibryd. Pt verbalized understanding of information discussed w/ no further questions at this time.

## 2020-03-16 NOTE — TELEPHONE ENCOUNTER
----- Message from Michelle Isbell sent at 3/16/2020 11:26 AM EDT -----  Regarding: Dr. Carmen Givens Message/Vendor Calls    Caller's first and last name: Patient      Reason for call: Returning a phone call from [facility] required yes/no and why:Yes       Best contact number(s): [phone]      Details to clarify the request:      Michelle sIbell      Copy/paste envera

## 2020-04-07 DIAGNOSIS — G43.909 MIGRAINE WITHOUT STATUS MIGRAINOSUS, NOT INTRACTABLE, UNSPECIFIED MIGRAINE TYPE: ICD-10-CM

## 2020-04-07 RX ORDER — SUMATRIPTAN 50 MG/1
TABLET, FILM COATED ORAL
Qty: 9 TAB | Refills: 0 | Status: SHIPPED | OUTPATIENT
Start: 2020-04-07 | End: 2020-05-29

## 2020-05-29 DIAGNOSIS — G43.909 MIGRAINE WITHOUT STATUS MIGRAINOSUS, NOT INTRACTABLE, UNSPECIFIED MIGRAINE TYPE: ICD-10-CM

## 2020-05-29 RX ORDER — SUMATRIPTAN 50 MG/1
TABLET, FILM COATED ORAL
Qty: 9 TAB | Refills: 0 | Status: SHIPPED | OUTPATIENT
Start: 2020-05-29 | End: 2020-07-01

## 2020-07-01 ENCOUNTER — TELEPHONE (OUTPATIENT)
Dept: INTERNAL MEDICINE CLINIC | Age: 50
End: 2020-07-01

## 2020-07-01 DIAGNOSIS — G43.909 MIGRAINE WITHOUT STATUS MIGRAINOSUS, NOT INTRACTABLE, UNSPECIFIED MIGRAINE TYPE: ICD-10-CM

## 2020-07-01 RX ORDER — SUMATRIPTAN 50 MG/1
TABLET, FILM COATED ORAL
Qty: 9 TAB | Refills: 0 | Status: SHIPPED | OUTPATIENT
Start: 2020-07-01 | End: 2020-07-30

## 2020-07-01 NOTE — LETTER
7/1/2020 Riccardo Chambers [address] [address] Dear Riccardo Chambers My records indicate that you are overdue for a follow up appointment. It is important to maintain your appointments so that I can monitor your health. Dr. Bobby Galindo is doing only Virtual visits only until further notice. Please call our office at [phone] to schedule an appointment. Sincerely, Rosalina Zazueta MD 
[address] 
[phone]

## 2020-07-28 DIAGNOSIS — G43.909 MIGRAINE WITHOUT STATUS MIGRAINOSUS, NOT INTRACTABLE, UNSPECIFIED MIGRAINE TYPE: ICD-10-CM

## 2020-07-28 DIAGNOSIS — R11.2 NAUSEA AND VOMITING, INTRACTABILITY OF VOMITING NOT SPECIFIED, UNSPECIFIED VOMITING TYPE: ICD-10-CM

## 2020-07-29 NOTE — TELEPHONE ENCOUNTER
Called, spoke with Pt. Two pt identifiers confirmed. Pt informed she's overdue for an appt with Dr. Mac Méndez.   Pt offered and accepted virtual appt for Wednesday, July 29, 2020 02:30 PM with Dr. Mac Méndez.   Pt verbalized understanding of information discussed w/ no further questions at this time.

## 2020-07-30 RX ORDER — ONDANSETRON 4 MG/1
TABLET, ORALLY DISINTEGRATING ORAL
Qty: 9 TAB | Refills: 0 | Status: SHIPPED | OUTPATIENT
Start: 2020-07-30 | End: 2020-12-23

## 2020-07-30 RX ORDER — SUMATRIPTAN 50 MG/1
TABLET, FILM COATED ORAL
Qty: 9 TAB | Refills: 0 | Status: SHIPPED | OUTPATIENT
Start: 2020-07-30 | End: 2021-02-01 | Stop reason: SDUPTHER

## 2020-07-31 ENCOUNTER — VIRTUAL VISIT (OUTPATIENT)
Dept: INTERNAL MEDICINE CLINIC | Age: 50
End: 2020-07-31

## 2020-07-31 DIAGNOSIS — Z00.00 ANNUAL PHYSICAL EXAM: Primary | ICD-10-CM

## 2020-07-31 NOTE — PROGRESS NOTES
Tacos Yip is a 51 y.o. female who was seen by synchronous (real-time) audio-video technology on 7/31/2020 for Complete Physical (annual physical); Hip Pain (bilateral hip pain. Discomfort & trouble sleeping); and Migraine (becoming more intense over the past year)      She reports that she has been under stress with work and her child at home. She is trying to work out at home. She has bilateral hip pain with left> right. She is icing the area, but not using NSAIDS. Migraines are getting worse She had a virtual visit with neurologist on Monday. She was given maxalt. Assessment & Plan:   Diagnoses and all orders for this visit:    1. Annual physical exam  -     ANTHONY MAMMO BI SCREENING INCL CAD; Future  -     METABOLIC PANEL, COMPREHENSIVE  -     CBC WITH AUTOMATED DIFF  -     LIPID PANEL  -     VITAMIN D, 25 HYDROXY    Mrs. Cassie Ortiz was encouraged to contact the office sooner if feeling ill for stress increased next few months. For her hip pain she was advised to begin taking Aleve for ibuprofen. She was advised to continue icing and resting the areas. She appears to have bursitis. If she does not show improvement in the next 2 weeks she will be referred to orthopedics. She has not started the Maxalt yet to see if that will help her migraines. This is currently being managed by her new neurologist.  [facility] she is due for mammogram and Pap smear. Order for mammogram will be mailed to her home along with other lab tests for her physical.  She was scheduled for Pap smear. Subjective:       Prior to Admission medications    Medication Sig Start Date End Date Taking?  Authorizing Provider   SUMAtriptan (IMITREX) 50 mg tablet TAKE ONE TABLET BY MOUTH AT ONSET OF HEADACHE; MAY REPEAT ONE TABLET IN 2 HOURS IF NEEDED. 7/30/20  Yes Florance Aschoff, MD   ondansetron (ZOFRAN ODT) 4 mg disintegrating tablet DISSOLVE ONE TABLET BY MOUTH EVERY 8 HOURS AS NEEDED FOR NAUSEA 7/30/20  Yes Corey Ferguson MD YADIRA   buPROPion XL (WELLBUTRIN XL) 150 mg tablet TAKE ONE TABLET BY MOUTH EVERY MORNING 3/16/20  Yes Edna Pabon MD   senna (SENNA) 8.6 mg tablet Take 1 Tab by mouth daily. 10/18/18   Martha Cuevas MD         Review of Systems   Constitutional: Negative. HENT: Negative. Eyes: Negative. Respiratory: Negative. Cardiovascular: Negative. Gastrointestinal: Negative. Genitourinary: Negative. Musculoskeletal: Positive for joint pain. Hip pain   Skin: Negative. Neurological: Positive for headaches.    Psychiatric/Behavioral:        Increase stress       Objective:     Patient-Reported Vitals 7/31/2020   Patient-Reported Weight 165 lb   Patient-Reported Height 5ft 5   Patient-Reported LMP 7/17/20        [INSTRUCTIONS:  \"[x]\" Indicates a positive item  \"[]\" Indicates a negative item  -- DELETE ALL ITEMS NOT EXAMINED]    Constitutional: [x] Appears well-developed and well-nourished [x] No apparent distress      [] Abnormal -     Mental status: [x] Alert and awake  [x] Oriented to person/place/time [x] Able to follow commands    [] Abnormal -     Eyes:   EOM    [x]  Normal    [] Abnormal -   Sclera  [x]  Normal    [] Abnormal -          Discharge [x]  None visible   [] Abnormal -     HENT: [x] Normocephalic, atraumatic  [] Abnormal -   [x] Mouth/Throat: Mucous membranes are moist    External Ears [x] Normal  [] Abnormal -    Neck: [x] No visualized mass [] Abnormal -     Pulmonary/Chest: [x] Respiratory effort normal   [x] No visualized signs of difficulty breathing or respiratory distress        [] Abnormal -      Musculoskeletal:   [x] Normal gait with no signs of ataxia         [x] Normal range of motion of neck        [] Abnormal -     Neurological:        [x] No Facial Asymmetry (Cranial nerve 7 motor function) (limited exam due to video visit)          [x] No gaze palsy        [] Abnormal -          Skin:        [x] No significant exanthematous lesions or discoloration noted on facial skin         [] Abnormal -            Psychiatric:       [x] Normal Affect [] Abnormal -        [x] No Hallucinations    Other pertinent observable physical exam findings:-        We discussed the expected course, resolution and complications of the diagnosis(es) in detail. Medication risks, benefits, costs, interactions, and alternatives were discussed as indicated. I advised her to contact the office if her condition worsens, changes or fails to improve as anticipated. She expressed understanding with the diagnosis(es) and plan. Beckymirta Samuel, who was evaluated through a patient-initiated, synchronous (real-time) audio-video encounter, and/or her healthcare decision maker, is aware that it is a billable service, with coverage as determined by her insurance carrier. She provided verbal consent to proceed: Yes, and patient identification was verified. It was conducted pursuant to the emergency declaration under the [address] [place] [address], [address] [place] [address] authority and the [employer] and [employer] General Act. A caregiver was present when appropriate. Ability to conduct physical exam was limited. I was at home. The patient was at home.       Sumi Brady MD

## 2020-07-31 NOTE — PROGRESS NOTES
Reviewed record in preparation for visit and have obtained necessary documentation. Identified pt with two pt identifiers(name and ). Chief Complaint   Patient presents with    Complete Physical     annual physical    Hip Pain     bilateral hip pain. Discomfort & trouble sleeping    Migraine     becoming more intense over the past year       Health Maintenance Due   Topic Date Due    DTaP/Tdap/Td  (1 - Tdap) 1992    Pap Test  2019       Ms. Karen Juan has a reminder for a \"due or due soon\" health maintenance. I have asked that she discuss this further with her primary care provider for follow-up on this health maintenance. Coordination of Care Questionnaire:  :     1) Have you been to an emergency room, urgent care clinic since your last visit? no   Hospitalized since your last visit? no             2) Have you seen or consulted any other health care providers outside of [address] [place] [address] since your last visit?  Yes, Dr. Danilo Griffith, neurology (Include any pap smears or colon screenings in this section.)

## 2020-09-02 DIAGNOSIS — F32.9 REACTIVE DEPRESSION: ICD-10-CM

## 2020-09-02 RX ORDER — BUPROPION HYDROCHLORIDE 150 MG/1
TABLET ORAL
Qty: 30 TAB | Refills: 2 | Status: SHIPPED | OUTPATIENT
Start: 2020-09-02 | End: 2020-12-23

## 2020-09-14 ENCOUNTER — HOSPITAL ENCOUNTER (OUTPATIENT)
Dept: MAMMOGRAPHY | Age: 50
Discharge: HOME | End: 2020-09-14
Attending: FAMILY MEDICINE
Payer: COMMERCIAL

## 2020-09-14 DIAGNOSIS — Z00.00 ANNUAL PHYSICAL EXAM: ICD-10-CM

## 2020-09-14 PROCEDURE — 77067 SCR MAMMO BI INCL CAD: CPT

## 2021-02-01 DIAGNOSIS — G43.909 MIGRAINE WITHOUT STATUS MIGRAINOSUS, NOT INTRACTABLE, UNSPECIFIED MIGRAINE TYPE: ICD-10-CM

## 2021-02-01 NOTE — TELEPHONE ENCOUNTER
Future Appointments:  Future Appointments   Date Time Provider Jen Augustin   8/2/2021  8:15 AM Agus Sellers MD [facility] AMB        Last Appointment With Me:  Visit date not found     Requested Prescriptions     Pending Prescriptions Disp Refills    SUMAtriptan (IMITREX) 50 mg tablet 9 Tab 0     Sig: Take 1 Tab by mouth once as needed for Migraine for up to 1 dose.

## 2021-02-01 NOTE — TELEPHONE ENCOUNTER
----- Message from Liang Botello sent at 2/1/2021 12:49 PM EST -----  Regarding: Dr. Dayron Arriaza  Medication Refill    Caller (if not patient): N/A      Relationship of caller (if not patient): N/A      Best contact number(s): [phone]      Name of medication and dosage if known: \"rizatriptan\" - 10 mg      Is patient out of this medication (yes/no): Yes      Pharmacy name: [facility] listed in chart? (yes/no): Yes  Pharmacy phone number: [phone]        Message from [facility]

## 2021-02-03 RX ORDER — SUMATRIPTAN 50 MG/1
50 TABLET, FILM COATED ORAL
Qty: 9 TAB | Refills: 0 | Status: SHIPPED | OUTPATIENT
Start: 2021-02-03 | End: 2021-02-03

## 2021-03-27 ENCOUNTER — HOSPITAL ENCOUNTER (EMERGENCY)
Age: 51
Discharge: HOME | End: 2021-03-28
Attending: EMERGENCY MEDICINE
Payer: COMMERCIAL

## 2021-03-27 DIAGNOSIS — R51.9 NONINTRACTABLE HEADACHE, UNSPECIFIED CHRONICITY PATTERN, UNSPECIFIED HEADACHE TYPE: Primary | ICD-10-CM

## 2021-03-27 DIAGNOSIS — R11.2 NAUSEA AND VOMITING, INTRACTABILITY OF VOMITING NOT SPECIFIED, UNSPECIFIED VOMITING TYPE: ICD-10-CM

## 2021-03-27 LAB
ANION GAP SERPL CALC-SCNC: 8 MMOL/L (ref 5–15)
BASOPHILS # BLD: 0 K/UL (ref 0–0.1)
BASOPHILS NFR BLD: 1 % (ref 0–1)
BUN SERPL-MCNC: 9 MG/DL (ref 6–20)
BUN/CREAT SERPL: 11 (ref 12–20)
CALCIUM SERPL-MCNC: 9.1 MG/DL (ref 8.5–10.1)
CHLORIDE SERPL-SCNC: 104 MMOL/L (ref 97–108)
CO2 SERPL-SCNC: 25 MMOL/L (ref 21–32)
CREAT SERPL-MCNC: 0.84 MG/DL (ref 0.55–1.02)
DIFFERENTIAL METHOD BLD: ABNORMAL
EOSINOPHIL # BLD: 0 K/UL (ref 0–0.4)
EOSINOPHIL NFR BLD: 0 % (ref 0–7)
ERYTHROCYTE [DISTWIDTH] IN BLOOD BY AUTOMATED COUNT: 13.1 % (ref 11.5–14.5)
GLUCOSE SERPL-MCNC: 90 MG/DL (ref 65–100)
HCT VFR BLD AUTO: 39.5 % (ref 35–47)
HGB BLD-MCNC: 13 G/DL (ref 11.5–16)
IMM GRANULOCYTES # BLD AUTO: 0 K/UL (ref 0–0.04)
IMM GRANULOCYTES NFR BLD AUTO: 0 % (ref 0–0.5)
LIPASE SERPL-CCNC: 62 U/L (ref 73–393)
LYMPHOCYTES # BLD: 0.9 K/UL (ref 0.8–3.5)
LYMPHOCYTES NFR BLD: 10 % (ref 12–49)
MCH RBC QN AUTO: 28.1 PG (ref 26–34)
MCHC RBC AUTO-ENTMCNC: 32.9 G/DL (ref 30–36.5)
MCV RBC AUTO: 85.3 FL (ref 80–99)
MONOCYTES # BLD: 0.4 K/UL (ref 0–1)
MONOCYTES NFR BLD: 5 % (ref 5–13)
NEUTS SEG # BLD: 7.3 K/UL (ref 1.8–8)
NEUTS SEG NFR BLD: 84 % (ref 32–75)
NRBC # BLD: 0 K/UL (ref 0–0.01)
NRBC BLD-RTO: 0 PER 100 WBC
PLATELET # BLD AUTO: 265 K/UL (ref 150–400)
PMV BLD AUTO: 10.9 FL (ref 8.9–12.9)
POTASSIUM SERPL-SCNC: 3.7 MMOL/L (ref 3.5–5.1)
RBC # BLD AUTO: 4.63 M/UL (ref 3.8–5.2)
SODIUM SERPL-SCNC: 137 MMOL/L (ref 136–145)
WBC # BLD AUTO: 8.6 K/UL (ref 3.6–11)

## 2021-03-27 PROCEDURE — 83690 ASSAY OF LIPASE: CPT

## 2021-03-27 PROCEDURE — 85025 COMPLETE CBC W/AUTO DIFF WBC: CPT

## 2021-03-27 PROCEDURE — 96375 TX/PRO/DX INJ NEW DRUG ADDON: CPT

## 2021-03-27 PROCEDURE — 80048 BASIC METABOLIC PNL TOTAL CA: CPT

## 2021-03-27 PROCEDURE — 36415 COLL VENOUS BLD VENIPUNCTURE: CPT

## 2021-03-27 PROCEDURE — 99284 EMERGENCY DEPT VISIT MOD MDM: CPT

## 2021-03-27 PROCEDURE — 96374 THER/PROPH/DIAG INJ IV PUSH: CPT

## 2021-03-27 RX ORDER — FAMOTIDINE 20 MG/1
20 TABLET, FILM COATED ORAL
Status: COMPLETED | OUTPATIENT
Start: 2021-03-27 | End: 2021-03-28

## 2021-03-27 RX ORDER — DIPHENHYDRAMINE HYDROCHLORIDE 50 MG/ML
25 INJECTION, SOLUTION INTRAMUSCULAR; INTRAVENOUS
Status: COMPLETED | OUTPATIENT
Start: 2021-03-27 | End: 2021-03-28

## 2021-03-27 RX ORDER — KETOROLAC TROMETHAMINE 30 MG/ML
15 INJECTION, SOLUTION INTRAMUSCULAR; INTRAVENOUS
Status: COMPLETED | OUTPATIENT
Start: 2021-03-27 | End: 2021-03-28

## 2021-03-28 VITALS
BODY MASS INDEX: 26.85 KG/M2 | HEIGHT: 65 IN | TEMPERATURE: 98.4 F | WEIGHT: 161.16 LBS | RESPIRATION RATE: 18 BRPM | OXYGEN SATURATION: 96 % | DIASTOLIC BLOOD PRESSURE: 65 MMHG | SYSTOLIC BLOOD PRESSURE: 119 MMHG | HEART RATE: 57 BPM

## 2021-03-28 PROCEDURE — 74011000250 HC RX REV CODE- 250: Performed by: EMERGENCY MEDICINE

## 2021-03-28 PROCEDURE — 74011250637 HC RX REV CODE- 250/637: Performed by: EMERGENCY MEDICINE

## 2021-03-28 PROCEDURE — 74011250636 HC RX REV CODE- 250/636: Performed by: EMERGENCY MEDICINE

## 2021-03-28 RX ADMIN — PROCHLORPERAZINE EDISYLATE 10 MG: 5 INJECTION INTRAMUSCULAR; INTRAVENOUS at 00:21

## 2021-03-28 RX ADMIN — KETOROLAC TROMETHAMINE 15 MG: 30 INJECTION, SOLUTION INTRAMUSCULAR at 00:23

## 2021-03-28 RX ADMIN — SODIUM CHLORIDE 1000 ML: 9 INJECTION, SOLUTION INTRAVENOUS at 00:33

## 2021-03-28 RX ADMIN — DIPHENHYDRAMINE HYDROCHLORIDE 25 MG: 50 INJECTION, SOLUTION INTRAMUSCULAR; INTRAVENOUS at 00:24

## 2021-03-28 RX ADMIN — FAMOTIDINE 20 MG: 20 TABLET, FILM COATED ORAL at 00:24

## 2021-03-28 NOTE — ED PROVIDER NOTES
EMERGENCY DEPARTMENT HISTORY AND PHYSICAL EXAM      Date: 3/27/2021  Patient Name: Elroy Wood    Please note that this dictation was completed with [employer], the computer voice recognition software. Quite often unanticipated grammatical, syntax, homophones, and other interpretive errors are inadvertently transcribed by the computer software. Please disregard these errors. Please excuse any errors that have escaped final proofreading. History of Presenting Illness     Chief Complaint   Patient presents with    Vomiting     Patient states that she had first dose of Moderna vaccine yesterday and since has had a migraine, chills, and vomiting. She states that she has vomited three times today and the last time looked like there was blood in it. She denies diarrhea but feels very weak and has not had an appetite,     Headache       History Provided By: Patient     HPI: Elroy Wood, 53 y.o. female, presenting the emergency department complaining of general fatigue, malaise, headache, nausea and vomiting. Symptoms started several hours after getting her second dose of the [place] vaccine for COVID-19. Patient states she developed a headache that was frontal, feels similar to prior migraines, but worse, took her triptan at home with no relief. She had nausea and vomiting initially nonbloody nonbilious, took ondansetron with no relief, vomiting continued and she did vomit up what looked like blood. No hematochezia or melena. Patient denies any fever, but admits to chills. Reports general fatigue, body aches. No other exacerbating or relieving factors or associated symptoms. Rates her symptoms as severe    PCP: Moises Wylie MD    No current facility-administered medications on file prior to encounter.       Current Outpatient Medications on File Prior to Encounter   Medication Sig Dispense Refill    ondansetron (ZOFRAN ODT) 4 mg disintegrating tablet DISSOLVE ONE TABLET BY MOUTH EVERY 8 HOURS AS NEEDED FOR NAUSEA 9 Tab 0    buPROPion XL (WELLBUTRIN XL) 150 mg tablet TAKE ONE TABLET BY MOUTH EVERY MORNING 30 Tab 3    senna (SENNA) 8.6 mg tablet Take 1 Tab by mouth daily. 15 Tab 0       Past History     Past Medical History:  Past Medical History:   Diagnosis Date    Headache     Headache(784.0)     migraine    Migraine 2018    TMJ (dislocation of temporomandibular joint)        Past Surgical History:  Past Surgical History:   Procedure Laterality Date    HX CYST REMOVAL  2003    HX ORTHOPAEDIC      ganglion cyst removal       Family History:  Family History   Problem Relation Age of Onset    Stroke Paternal Grandmother     Alzheimer Paternal Grandmother     Cancer Paternal Grandfather         lung    Hypertension Mother     Glaucoma Mother     Cancer Father     Hypertension Brother     Cancer Paternal Uncle         pancreatic       Social History:  Social History     Tobacco Use    Smoking status: Never Smoker    Smokeless tobacco: Never Used   Substance Use Topics    Alcohol use: Yes     Alcohol/week: 0.0 standard drinks     Types: 5 - 6 Glasses of wine per week     Comment: few drinks weekly     Drug use: Never       Allergies:  No Known Allergies      Review of Systems   Review of Systems   Constitutional: Positive for chills. Negative for fever. Eyes: Positive for photophobia. Gastrointestinal: Positive for nausea and vomiting. Musculoskeletal: Positive for myalgias. Neurological: Positive for headaches. All other systems reviewed and are negative. Physical Exam   Physical Exam  Vitals signs and nursing note reviewed. Constitutional:       Appearance: She is well-developed. HENT:      Head: Normocephalic and atraumatic. Eyes:      General:         Right eye: No discharge. Left eye: No discharge. Conjunctiva/sclera: Conjunctivae normal.      Pupils: Pupils are equal, round, and reactive to light.    Neck:      Musculoskeletal: Normal range of motion and neck supple. Trachea: No tracheal deviation. Cardiovascular:      Rate and Rhythm: Normal rate and regular rhythm. Heart sounds: Normal heart sounds. No murmur. Pulmonary:      Effort: Pulmonary effort is normal. No respiratory distress. Breath sounds: Normal breath sounds. No wheezing or rales. Abdominal:      General: Bowel sounds are normal.      Palpations: Abdomen is soft. Tenderness: There is no abdominal tenderness. There is no guarding or rebound. Musculoskeletal: Normal range of motion. General: No tenderness or deformity. Skin:     General: Skin is warm and dry. Findings: No erythema or rash. Neurological:      Mental Status: She is alert and oriented to person, place, and time. Psychiatric:         Behavior: Behavior normal.         Diagnostic Study Results     Labs -     Recent Results (from the past 12 hour(s))   CBC WITH AUTOMATED DIFF    Collection Time: 03/27/21 10:27 PM   Result Value Ref Range    WBC 8.6 3.6 - 11.0 K/uL    RBC 4.63 3.80 - 5.20 M/uL    HGB 13.0 11.5 - 16.0 g/dL    HCT 39.5 35.0 - 47.0 %    MCV 85.3 80.0 - 99.0 FL    MCH 28.1 26.0 - 34.0 PG    MCHC 32.9 30.0 - 36.5 g/dL    RDW 13.1 11.5 - 14.5 %    PLATELET 040 311 - 110 K/uL    MPV 10.9 8.9 - 12.9 FL    NRBC 0.0 0  WBC    ABSOLUTE NRBC 0.00 0.00 - 0.01 K/uL    NEUTROPHILS 84 (H) 32 - 75 %    LYMPHOCYTES 10 (L) 12 - 49 %    MONOCYTES 5 5 - 13 %    EOSINOPHILS 0 0 - 7 %    BASOPHILS 1 0 - 1 %    IMMATURE GRANULOCYTES 0 0.0 - 0.5 %    ABS. NEUTROPHILS 7.3 1.8 - 8.0 K/UL    ABS. LYMPHOCYTES 0.9 0.8 - 3.5 K/UL    ABS. MONOCYTES 0.4 0.0 - 1.0 K/UL    ABS. EOSINOPHILS 0.0 0.0 - 0.4 K/UL    ABS. BASOPHILS 0.0 0.0 - 0.1 K/UL    ABS. IMM.  GRANS. 0.0 0.00 - 0.04 K/UL    DF AUTOMATED     METABOLIC PANEL, BASIC    Collection Time: 03/27/21 10:27 PM   Result Value Ref Range    Sodium 137 136 - 145 mmol/L    Potassium 3.7 3.5 - 5.1 mmol/L    Chloride 104 97 - 108 mmol/L    CO2 25 21 - 32 mmol/L Anion gap 8 5 - 15 mmol/L    Glucose 90 65 - 100 mg/dL    BUN 9 6 - 20 MG/DL    Creatinine 0.84 0.55 - 1.02 MG/DL    BUN/Creatinine ratio 11 (L) 12 - 20      GFR est AA >60 >60 ml/min/1.73m2    GFR est non-AA >60 >60 ml/min/1.73m2    Calcium 9.1 8.5 - 10.1 MG/DL   LIPASE    Collection Time: 03/27/21 10:27 PM   Result Value Ref Range    Lipase 62 (L) 73 - 393 U/L       Radiologic Studies -   No orders to display     CT Results  (Last 48 hours)    None        CXR Results  (Last 48 hours)    None            Medical Decision Making   I am the first provider for this patient. I reviewed the vital signs, available nursing notes, past medical history, past surgical history, family history and social history. Vital Signs-Reviewed the patient's vital signs. Patient Vitals for the past 12 hrs:   Temp Pulse Resp BP SpO2   03/28/21 0100    119/65 96 %   03/28/21 0053 98.4 °F (36.9 °C)       03/28/21 0028    134/68    03/27/21 2103 98.4 °F (36.9 °C) (!) 57 18 122/81 98 %           Records Reviewed:   Nursing notes, Prior visits     Provider Notes (Medical Decision Making):   Patient evaluated found to be in no acute cardiopulmonary distress. Symptoms most consistent with reaction to the vaccine which exacerbated migraine. Will check labs, blood in the vomit most likely from Jayleen-Jones tear. No concern for significant bleeding at this time, patient hemodynamically stable. Will give migraine cocktail, will give some Pepcid. Disposition likely to home after symptomatic management    ED Course:   Initial assessment performed. The patients presenting problems have been discussed, and they are in agreement with the care plan formulated and outlined with them. I have encouraged them to ask questions as they arise throughout their visit. Critical Care Time:   none    Disposition:    DISCHARGE NOTE  Patients results have been reviewed with them.   Patient and/or family have verbally conveyed their understanding and agreement of the patient's signs, symptoms, diagnosis, treatment and prognosis and additionally agree to follow up as recommended or return to the Emergency Room should their condition change or have any new concerns prior to their follow-up appointment. Patient verbally agrees with the care-plan and verbally conveys that all of their questions have been answered. Discharge instructions have also been provided to the patient with some educational information regarding their diagnosis as well a list of reasons why they would want to return to the ER prior to their follow-up appointment should their condition change. PLAN:  1. Discharge Medication List as of 3/28/2021 12:54 AM        2. Follow-up Information     Follow up With Specialties Details Why Contact Info    Isis Mercado MD [facility] Medicine Schedule an appointment as soon as possible for a visit   [address] [place] [address]  [phone]      [facility] EMERGENCY DEPT Emergency Medicine  If symptoms worsen [address] [place] [address] Drive  [address] [facility]  [phone]          Return to ED if worse     Diagnosis     Clinical Impression:   1. Nonintractable headache, unspecified chronicity pattern, unspecified headache type    2. Nausea and vomiting, intractability of vomiting not specified, unspecified vomiting type        Attestations:   This note was completed by Effie Giron DO

## 2021-03-28 NOTE — ED NOTES
Dr Tina Granado reviewed discharge instructions with the patient. The patient verbalized understanding. All questions and concerns were addressed. The patient declined a wheelchair and is discharged ambulatory in the care of family members with instructions and prescriptions in hand. Pt is alert and oriented x 4. Respirations are clear and unlabored.

## 2021-03-31 ENCOUNTER — VIRTUAL VISIT (OUTPATIENT)
Dept: INTERNAL MEDICINE CLINIC | Age: 51
End: 2021-03-31
Payer: COMMERCIAL

## 2021-03-31 DIAGNOSIS — T50.Z95D ADVERSE EFFECT OF VACCINE, SUBSEQUENT ENCOUNTER: ICD-10-CM

## 2021-03-31 DIAGNOSIS — G43.909 MIGRAINE WITHOUT STATUS MIGRAINOSUS, NOT INTRACTABLE, UNSPECIFIED MIGRAINE TYPE: Primary | ICD-10-CM

## 2021-03-31 PROCEDURE — 99214 OFFICE O/P EST MOD 30 MIN: CPT | Performed by: FAMILY MEDICINE

## 2021-03-31 NOTE — PROGRESS NOTES
This is an established visit conducted via telemedicine. The patient has been instructed that this meets HIPAA criteria and acknowledges and agrees to this method of visitation. Identified pt with two pt identifiers(name and ). Chief Complaint   Patient presents with   [facility] Follow Up     Reaction- got the covid vaccine friday side effect was a headache by saturday the headache turned into a migraine that then lead to vomiting blood late that evening        Health Maintenance Due   Topic Date Due    Hepatitis C Test  Never done    DTaP/Tdap/Td  (1 - Tdap) Never done    Pap Test  2019    Yearly Flu Vaccine (1) 2020       Ms. Luwana Halsted has a reminder for a \"due or due soon\" health maintenance. I have asked that she discuss this further with her primary care provider for follow-up on this health maintenance.

## 2021-03-31 NOTE — PROGRESS NOTES
Aisha Gillespie is a 53 y.o. female who was seen by synchronous (real-time) audio-video technology on 3/31/2021 for Hospital Follow Up (Reaction- got the covid vaccine friday side effect was a headache by saturday the headache turned into a migraine that then lead to vomiting blood late that evening )    Assessment & Plan:   Diagnoses and all orders for this visit:    1. Migraine without status migrainosus, not intractable, unspecified migraine type  -     REFERRAL TO NEUROLOGY    2. Adverse effect of vaccine, subsequent encounter    1. Migraine without Status Migrainosus  I discussed the option of Botox or Topomax to manage her migraines. I advised her to see neurology again for further evaluation and management. I referred pt to Dr. Krista Wyatt (neurology). 2. Adverse Effect of Vaccine, Subsequent Encounter  It is possible  the vaccine triggered a migraine that exacerbated the nausea and vomiting. I am unsure why the migraine was not affected by her usual medication. I will need to research how to proceed with the second shot due to her rxn to the first dose. I will talk with the CDC about her reaction to the vaccine. Subjective:     Patient presents virtually today for an ED follow-up. Hospital Summary: Pt presented to [facility] EMERGENCY DEPT on 03/27/2021 c/o vomiting and a headache. She stated her sxs started after receiving her first dose of Moderna vaccine and the headaches were not relieved by her migraine medication. Pt was discharged on 03/28/2021. Migraine without Status Migrainosus: She reports tracking her migraines since 05/2020 and notes averaging 5 migraines per month. She states she has purchased a new mattress to see if her old mattress was exacerbating her migraines. She was advised during her ED visit to discuss with her PCP about better managing her migraines. She reports currently using up to 2 Maxalt 10 mg tablets with some relief. She recalls Dr. Kaden Barnes of [facility] prescribed the [address]. Pt denies the migraines occur around her cycle. Adverse Effect of Vaccine: Pt notes she is feeling better today. Of note, pt had the first dose of the Moderna vaccine on 03/26/2021. She states the migraine cocktail from the ED visit resolved her headache. She notes she feels back to baseline today after recovering over the weekend. Prior to Admission medications    Medication Sig Start Date End Date Taking? Authorizing Provider   ondansetron (ZOFRAN ODT) 4 mg disintegrating tablet DISSOLVE ONE TABLET BY MOUTH EVERY 8 HOURS AS NEEDED FOR NAUSEA 12/23/20  Yes Claudine Olson MD   buPROPion XL (WELLBUTRIN XL) 150 mg tablet TAKE ONE TABLET BY MOUTH EVERY MORNING 12/23/20  Yes Claudine Olson MD   senna (SENNA) 8.6 mg tablet Take 1 Tab by mouth daily. 10/18/18  Yes Katia Olson MD     Patient Active Problem List    Diagnosis Date Noted    Insomnia 11/13/2015    Migraine 11/13/2015     Current Outpatient Medications   Medication Sig Dispense Refill    ondansetron (ZOFRAN ODT) 4 mg disintegrating tablet DISSOLVE ONE TABLET BY MOUTH EVERY 8 HOURS AS NEEDED FOR NAUSEA 9 Tab 0    buPROPion XL (WELLBUTRIN XL) 150 mg tablet TAKE ONE TABLET BY MOUTH EVERY MORNING 30 Tab 3    senna (SENNA) 8.6 mg tablet Take 1 Tab by mouth daily.  15 Tab 0     No Known Allergies  Past Medical History:   Diagnosis Date    Headache     Headache(784.0)     migraine    Migraine 2018    TMJ (dislocation of temporomandibular joint)      Past Surgical History:   Procedure Laterality Date    HX CYST REMOVAL  2003    HX ORTHOPAEDIC      ganglion cyst removal     Family History   Problem Relation Age of Onset    Stroke Paternal Grandmother     Alzheimer Paternal Grandmother     Cancer Paternal Grandfather         lung    Hypertension Mother     Glaucoma Mother     Cancer Father     Hypertension Brother     Cancer Paternal Uncle         pancreatic     Social History     Tobacco Use    Smoking status: Never Smoker    Smokeless tobacco: Never Used   Substance Use Topics    Alcohol use: Yes     Alcohol/week: 0.0 standard drinks     Types: 5 - 6 Glasses of wine per week     Comment: few drinks weekly        Review of Systems   Constitutional: Negative. HENT: Negative. Respiratory: Negative for shortness of breath. Cardiovascular: Negative for chest pain. Gastrointestinal: Negative. Genitourinary: Negative. Musculoskeletal: Negative. Skin: Negative. Neurological: Negative for headaches. Psychiatric/Behavioral: Negative.         Objective:     Patient-Reported Vitals 7/31/2020   Patient-Reported Weight 165 lb   Patient-Reported Height 5ft 5   Patient-Reported LMP 7/17/20        [INSTRUCTIONS:  \"[x]\" Indicates a positive item  \"[]\" Indicates a negative item  -- DELETE ALL ITEMS NOT EXAMINED]    Constitutional: [x] Appears well-developed and well-nourished [x] No apparent distress      [] Abnormal -     Mental status: [x] Alert and awake  [x] Oriented to person/place/time [x] Able to follow commands    [] Abnormal -     Eyes:   EOM    [x]  Normal    [] Abnormal -   Sclera  [x]  Normal    [] Abnormal -          Discharge [x]  None visible   [] Abnormal -     HENT: [x] Normocephalic, atraumatic  [] Abnormal -   [x] Mouth/Throat: Mucous membranes are moist    External Ears [x] Normal  [] Abnormal -    Neck: [x] No visualized mass [] Abnormal -     Pulmonary/Chest: [x] Respiratory effort normal   [x] No visualized signs of difficulty breathing or respiratory distress        [] Abnormal -      Musculoskeletal:   [x] Normal gait with no signs of ataxia         [x] Normal range of motion of neck        [] Abnormal -     Neurological:        [x] No Facial Asymmetry (Cranial nerve 7 motor function) (limited exam due to video visit)          [x] No gaze palsy        [] Abnormal -          Skin:        [x] No significant exanthematous lesions or discoloration noted on facial skin         [] Abnormal -            Psychiatric: [x] Normal Affect [] Abnormal -        [x] No Hallucinations    Other pertinent observable physical exam findings:-        We discussed the expected course, resolution and complications of the diagnosis(es) in detail. Medication risks, benefits, costs, interactions, and alternatives were discussed as indicated. I advised her to contact the office if her condition worsens, changes or fails to improve as anticipated. She expressed understanding with the diagnosis(es) and plan. Genna Ruvalcaba, was evaluated through a synchronous (real-time) audio-video encounter. The patient (or guardian if applicable) is aware that this is a billable service. Verbal consent to proceed has been obtained within the past 12 months. The visit was conducted pursuant to the emergency declaration under the [address] [place] [address] authority and the [employer] General Act. Patient identification was verified, and a caregiver was present when appropriate. The patient was located in a state where the provider was credentialed to provide care.       Anjali Magana, as dictated by Aramis Cullen MD.

## 2021-04-26 ENCOUNTER — TELEPHONE (OUTPATIENT)
Dept: INTERNAL MEDICINE CLINIC | Age: 51
End: 2021-04-26

## 2021-05-20 DIAGNOSIS — F32.9 REACTIVE DEPRESSION: ICD-10-CM

## 2021-05-20 RX ORDER — BUPROPION HYDROCHLORIDE 150 MG/1
TABLET ORAL
Qty: 30 TABLET | Refills: 5 | Status: SHIPPED | OUTPATIENT
Start: 2021-05-20 | End: 2021-09-27

## 2021-08-02 ENCOUNTER — OFFICE VISIT (OUTPATIENT)
Dept: INTERNAL MEDICINE CLINIC | Age: 51
End: 2021-08-02
Payer: COMMERCIAL

## 2021-08-02 VITALS
HEIGHT: 65 IN | OXYGEN SATURATION: 98 % | DIASTOLIC BLOOD PRESSURE: 78 MMHG | HEART RATE: 50 BPM | SYSTOLIC BLOOD PRESSURE: 125 MMHG | TEMPERATURE: 97.2 F | WEIGHT: 169.4 LBS | RESPIRATION RATE: 16 BRPM | BODY MASS INDEX: 28.22 KG/M2

## 2021-08-02 DIAGNOSIS — M25.50 MULTIPLE JOINT PAIN: ICD-10-CM

## 2021-08-02 DIAGNOSIS — Z00.00 ANNUAL PHYSICAL EXAM: Primary | ICD-10-CM

## 2021-08-02 LAB
ALBUMIN SERPL-MCNC: 3.7 G/DL (ref 3.5–5)
ALBUMIN/GLOB SERPL: 1.3 {RATIO} (ref 1.1–2.2)
ALP SERPL-CCNC: 74 U/L (ref 45–117)
ALT SERPL-CCNC: 21 U/L (ref 12–78)
ANION GAP SERPL CALC-SCNC: 5 MMOL/L (ref 5–15)
AST SERPL-CCNC: 24 U/L (ref 15–37)
BASOPHILS # BLD: 0 K/UL (ref 0–0.1)
BASOPHILS NFR BLD: 1 % (ref 0–1)
BILIRUB SERPL-MCNC: 0.5 MG/DL (ref 0.2–1)
BILIRUB UR QL STRIP: NEGATIVE
BUN SERPL-MCNC: 11 MG/DL (ref 6–20)
BUN/CREAT SERPL: 14 (ref 12–20)
CALCIUM SERPL-MCNC: 8.7 MG/DL (ref 8.5–10.1)
CHLORIDE SERPL-SCNC: 107 MMOL/L (ref 97–108)
CHOLEST SERPL-MCNC: 179 MG/DL
CO2 SERPL-SCNC: 27 MMOL/L (ref 21–32)
CREAT SERPL-MCNC: 0.76 MG/DL (ref 0.55–1.02)
DIFFERENTIAL METHOD BLD: NORMAL
EOSINOPHIL # BLD: 0.1 K/UL (ref 0–0.4)
EOSINOPHIL NFR BLD: 2 % (ref 0–7)
ERYTHROCYTE [DISTWIDTH] IN BLOOD BY AUTOMATED COUNT: 13.1 % (ref 11.5–14.5)
ERYTHROCYTE [SEDIMENTATION RATE] IN BLOOD: 5 MM/HR (ref 0–20)
GLOBULIN SER CALC-MCNC: 2.8 G/DL (ref 2–4)
GLUCOSE SERPL-MCNC: 85 MG/DL (ref 65–100)
GLUCOSE UR-MCNC: NEGATIVE MG/DL
HCT VFR BLD AUTO: 41.6 % (ref 35–47)
HCV AB SERPL QL IA: NONREACTIVE
HCV COMMENT,HCGAC: NORMAL
HDLC SERPL-MCNC: 99 MG/DL
HDLC SERPL: 1.8 {RATIO} (ref 0–5)
HGB BLD-MCNC: 13.1 G/DL (ref 11.5–16)
IMM GRANULOCYTES # BLD AUTO: 0 K/UL (ref 0–0.04)
IMM GRANULOCYTES NFR BLD AUTO: 0 % (ref 0–0.5)
KETONES P FAST UR STRIP-MCNC: NEGATIVE MG/DL
LDLC SERPL CALC-MCNC: 67.6 MG/DL (ref 0–100)
LYMPHOCYTES # BLD: 1.5 K/UL (ref 0.8–3.5)
LYMPHOCYTES NFR BLD: 33 % (ref 12–49)
MCH RBC QN AUTO: 27.5 PG (ref 26–34)
MCHC RBC AUTO-ENTMCNC: 31.5 G/DL (ref 30–36.5)
MCV RBC AUTO: 87.4 FL (ref 80–99)
MONOCYTES # BLD: 0.4 K/UL (ref 0–1)
MONOCYTES NFR BLD: 9 % (ref 5–13)
NEUTS SEG # BLD: 2.6 K/UL (ref 1.8–8)
NEUTS SEG NFR BLD: 55 % (ref 32–75)
NRBC # BLD: 0 K/UL (ref 0–0.01)
NRBC BLD-RTO: 0 PER 100 WBC
PH UR STRIP: 7 [PH] (ref 4.6–8)
PLATELET # BLD AUTO: 242 K/UL (ref 150–400)
PMV BLD AUTO: 11.6 FL (ref 8.9–12.9)
POTASSIUM SERPL-SCNC: 4.3 MMOL/L (ref 3.5–5.1)
PROT SERPL-MCNC: 6.5 G/DL (ref 6.4–8.2)
PROT UR QL STRIP: NORMAL
RBC # BLD AUTO: 4.76 M/UL (ref 3.8–5.2)
SODIUM SERPL-SCNC: 139 MMOL/L (ref 136–145)
SP GR UR STRIP: 1.03 (ref 1–1.03)
TRIGL SERPL-MCNC: 62 MG/DL (ref ?–150)
UA UROBILINOGEN AMB POC: NORMAL (ref 0.2–1)
URINALYSIS CLARITY POC: CLEAR
URINALYSIS COLOR POC: YELLOW
URINE BLOOD POC: NEGATIVE
URINE LEUKOCYTES POC: NEGATIVE
URINE NITRITES POC: NEGATIVE
VLDLC SERPL CALC-MCNC: 12.4 MG/DL
WBC # BLD AUTO: 4.6 K/UL (ref 3.6–11)

## 2021-08-02 PROCEDURE — 99396 PREV VISIT EST AGE 40-64: CPT | Performed by: FAMILY MEDICINE

## 2021-08-02 PROCEDURE — 81003 URINALYSIS AUTO W/O SCOPE: CPT | Performed by: FAMILY MEDICINE

## 2021-08-02 RX ORDER — RIZATRIPTAN BENZOATE 10 MG/1
10 TABLET ORAL
COMMUNITY

## 2021-08-02 NOTE — PROGRESS NOTES
SUBJECTIVE:   Javier Owens is a 53 y.o. female who is here for annual physical exam.    Annual Physical Exam: Pt remarks her energy level fluctuates. She denies any changes in her diet over the last year. Pt indicates mild vaginal irritation to which she attributes to being on her Peloton. Pt specifically denies changes in vision or hearing, trouble with swallowing or taste, CP, SOB, heartburn or upset stomach, change in bowel habits, problems urinating, unusual joint or muscle pains, numbness or tingling in extremities, or skin lesions of concern. Multiple joint pain:  Pt reports experiencing joint pain throughout the entire body. She notes getting a Peloton last year but has not felt like she is losing weight but has been having pain in different areas of her body. She has been doing intermittent fasting. She remarks pain on her right hip. Pt indicates pain is present when she sits for long periods of time or when she first gets up in the morning. She denies taking any medications to help alleviate the pain. PREVENTIVE:  Colonoscopy: Due   Pap: Due  Mammogram: Due (09/2021)   Shingrix: Interested upon eligibility   Eye Exam: UTD   COVID-19: Completed (Reyes Meiers series)     At this time, she is otherwise doing well and has brought no other complaints to my attention today. For a list of the medical issues addressed today, see the assessment and plan below. PMH:   Past Medical History:   Diagnosis Date    Headache     Headache(784.0)     migraine    Migraine 2018    TMJ (dislocation of temporomandibular joint)        PSH:  has a past surgical history that includes hx orthopaedic and hx cyst removal (2003). Allergies: has No Known Allergies. Meds:   Current Outpatient Medications   Medication Sig    rizatriptan (MAXALT) 10 mg tablet Take 10 mg by mouth once as needed.     buPROPion XL (WELLBUTRIN XL) 150 mg tablet TAKE ONE TABLET BY MOUTH EVERY MORNING    ondansetron (ZOFRAN ODT) 4 mg disintegrating tablet DISSOLVE ONE TABLET BY MOUTH EVERY 8 HOURS AS NEEDED FOR NAUSEA    senna (SENNA) 8.6 mg tablet Take 1 Tab by mouth daily. (Patient taking differently: Take 1 Tablet by mouth as needed.)     No current facility-administered medications for this visit. Fam hx: family history includes Alzheimer in her paternal grandmother; Cancer in her father, paternal grandfather, and paternal uncle; Glaucoma in her mother; Hypertension in her brother and mother; Stroke in her paternal grandmother. Soc hx:  reports that she has never smoked. She has never used smokeless tobacco. She reports current alcohol use. She reports that she does not use drugs. Review of Systems - History obtained from the patient  General ROS: negative  Psychological ROS: negative  Ophthalmic ROS: negative  ENT ROS: negative  Respiratory ROS: no cough, shortness of breath, or wheezing  Cardiovascular ROS: no chest pain or dyspnea on exertion  Gastrointestinal ROS: no abdominal pain, change in bowel habits, or black or bloody stools  Genito-Urinary ROS: negative  Musculoskeletal ROS: + multiple joint pain  Neurological ROS: negative  Dermatological ROS: negative    OBJECTIVE:   Vitals:   Visit Vitals  /78 (BP 1 Location: Left upper arm, BP Patient Position: Sitting, BP Cuff Size: Adult)   Pulse (!) 50   Temp 97.2 °F (36.2 °C) (Temporal)   Resp 16   Ht 5' 5\" (1.651 m)   Wt 169 lb 6.4 oz (76.8 kg)   LMP 07/19/2021   SpO2 98%   BMI 28.19 kg/m²     Gen: Pleasant 53 y.o. female in NAD. HEENT: PERRLA. EOMI. OP moist and pink. EARS: TMs normal and canals equal bilaterally. NECK: Supple. No LAD. No thyromegaly. HEART: RRR, No M/G/R.     LUNGS: CTAB No W/R. ABDOMEN: S, NT, ND, BS+, mild llq tenderness upon palpation. BREAST: nl  EXTREMITIES: Warm. No C/C/E.    MUSCULOSKELETAL: Normal ROM, muscle strength 5/5 all groups, tenderness along the trochanteric bursa     NEURO: Alert and oriented x 3.   Cranial nerves grossly intact. No focal sensory or motor deficits noted. SKIN: Warm. Dry. No rashes or other lesions noted. ASSESSMENT/ PLAN:     Diagnoses and all orders for this visit:    1. Annual physical exam  -     AMB POC URINALYSIS DIP STICK AUTO W/O MICRO  -     LIPID PANEL; Future  -     METABOLIC PANEL, COMPREHENSIVE; Future  -     CBC WITH AUTOMATED DIFF; Future  -     ANTHONY MAMMO BI SCREENING INCL CAD; Future  -     REFERRAL TO GASTROENTEROLOGY  -     HEPATITIS C AB; Future    2. Multiple joint pain  -     SED RATE (ESR); Future      1. Annual physical exam   Ordered AMB POC urinalysis dip stick auto w/o micro. I suggested trying more plant based meals along with incorporating more fish in her diet over the next month. Recheck lipid panel, CMP, CBC, and Hepatitis C AB. Ordered ANTHONY MAMMO BI SCREENING INCL CAD for health maintenance. Referral to Dr. Nino Soliz (gastroenterology) for colonoscopy. I performed a breast exam during today's visit and note there were no masses and normal axilla. She will schedule her pap in the fall when she gets her Shingrix and mammogram.    2. Multiple joint pain   Recheck SED RATE (ESR) for further evaluation. I believe that she may also have a trochanteric bursitis. I suggested trying to ice the area to alleviate the inflammation along with light stretching. I recommended for her to visit with  [facility] if the pain persists. I have reviewed the patient's medications and risks/side effects/benefits were discussed. Diagnosis(-es) explained to patient and questions answered. Literature provided where appropriate.      Written by Alex Rosario, as dictated by Michael López MD.

## 2021-08-02 NOTE — PROGRESS NOTES
Identified pt with two pt identifiers(name and ). Reviewed record in preparation for visit and have obtained necessary documentation. Chief Complaint   Patient presents with    Complete Physical        Health Maintenance Due   Topic    Hepatitis C Screening     DTaP/Tdap/Td series (1 - Tdap)    Colorectal Cancer Screening Combo     PAP AKA CERVICAL CYTOLOGY     COVID-19 Vaccine (2 - Moderna 2-dose series)    Lipid Screen         Visit Vitals  /78 (BP 1 Location: Left upper arm, BP Patient Position: Sitting, BP Cuff Size: Adult)   Pulse (!) 50   Temp 97.2 °F (36.2 °C) (Temporal)   Resp 16   Ht 5' 5\" (1.651 m)   Wt 169 lb 6.4 oz (76.8 kg)   LMP 2021   SpO2 98%   BMI 28.19 kg/m²     Pain Scale: 0 - No pain/10    Coordination of Care Questionnaire:  :   1. Have you been to the ER, urgent care clinic since your last visit? Hospitalized since your last visit?2021 Urgent care     2. Have you seen or consulted any other health care providers outside of the [address] [place] [address] since your last visit? Include any pap smears or colon screening.  yes

## 2021-08-04 ENCOUNTER — TELEPHONE (OUTPATIENT)
Dept: INTERNAL MEDICINE CLINIC | Age: 51
End: 2021-08-04

## 2021-08-04 NOTE — TELEPHONE ENCOUNTER
----- Message from [facility] sent at 8/4/2021  1:22 PM EDT -----    ----- Message -----  From: Aj De Dios MD  Sent: 8/3/2021  11:48 PM EDT  To: Bran Boykin LPN, Moustapha Lozada    Please inform the patient of her lab results are normal.

## 2021-09-27 ENCOUNTER — HOSPITAL ENCOUNTER (OUTPATIENT)
Dept: MAMMOGRAPHY | Age: 51
Discharge: HOME | End: 2021-09-27
Attending: FAMILY MEDICINE
Payer: COMMERCIAL

## 2021-09-27 DIAGNOSIS — F32.9 REACTIVE DEPRESSION: ICD-10-CM

## 2021-09-27 DIAGNOSIS — Z00.00 ANNUAL PHYSICAL EXAM: ICD-10-CM

## 2021-09-27 PROCEDURE — 77067 SCR MAMMO BI INCL CAD: CPT

## 2021-09-27 RX ORDER — BUPROPION HYDROCHLORIDE 150 MG/1
TABLET ORAL
Qty: 90 TABLET | Refills: 1 | Status: SHIPPED | OUTPATIENT
Start: 2021-09-27 | End: 2022-05-17 | Stop reason: DRUGHIGH

## 2021-10-04 ENCOUNTER — HOSPITAL ENCOUNTER (OUTPATIENT)
Dept: LAB | Age: 51
Discharge: HOME | End: 2021-10-04
Payer: COMMERCIAL

## 2021-10-04 ENCOUNTER — OFFICE VISIT (OUTPATIENT)
Dept: INTERNAL MEDICINE CLINIC | Age: 51
End: 2021-10-04
Payer: COMMERCIAL

## 2021-10-04 VITALS
TEMPERATURE: 98.1 F | WEIGHT: 172 LBS | OXYGEN SATURATION: 99 % | RESPIRATION RATE: 18 BRPM | DIASTOLIC BLOOD PRESSURE: 74 MMHG | HEIGHT: 65 IN | HEART RATE: 54 BPM | BODY MASS INDEX: 28.66 KG/M2 | SYSTOLIC BLOOD PRESSURE: 116 MMHG

## 2021-10-04 DIAGNOSIS — Z12.4 PAP SMEAR FOR CERVICAL CANCER SCREENING: Primary | ICD-10-CM

## 2021-10-04 PROCEDURE — 88175 CYTOPATH C/V AUTO FLUID REDO: CPT

## 2021-10-04 PROCEDURE — 99214 OFFICE O/P EST MOD 30 MIN: CPT | Performed by: FAMILY MEDICINE

## 2021-10-04 RX ORDER — KETOROLAC TROMETHAMINE 10 MG/1
10 TABLET, FILM COATED ORAL AS NEEDED
COMMUNITY
Start: 2021-09-02 | End: 2022-07-18 | Stop reason: SDUPTHER

## 2021-10-04 NOTE — PROGRESS NOTES
Natalie Gallardo is a 49 y.o. female  HIPAA verified by two patient identifiers. Health Maintenance Due   Topic    DTaP/Tdap/Td series (1 - Tdap)    Colorectal Cancer Screening Combo     Cervical cancer screen     Flu Vaccine (1)    Shingrix Vaccine Age 50> (1 of 2)     Visit Vitals  /74   Pulse (!) 54   Temp 98.1 °F (36.7 °C) (Temporal)   Resp 18   Ht 5' 5\" (1.651 m)   Wt 172 lb (78 kg)   LMP 09/15/2021   SpO2 99%   BMI 28.62 kg/m²       Pain Scale: 0 - No pain/10  Pain Location:   1. Have you been to the ER, urgent care clinic since your last visit? Hospitalized since your last visit? Yes When: Sep. 1,2021 [facility] for migraine    2. Have you seen or consulted any other health care providers outside of the [address] [place] [address] since your last visit? Include any pap smears or colon screening.  No

## 2021-10-04 NOTE — PROGRESS NOTES
SUBJECTIVE:   Ms. Genna Ruvalcaba is a 49 y.o. female who is here for well woman exam.      Pt reports doing well. Pap smear for cervical cancer screening: She notes experiencing some jock itch like irritation. Pt indicates using some Tinactin to help alleviate the irritation. Pt reports her period was about 3 weeks ago. She denies having an IUD. Pt endorses having a regular menstrual cycle. At this time, she is otherwise doing well and has brought no other complaints to my attention today. For a list of the medical issues addressed today, see the assessment and plan below. PMH:   Past Medical History:   Diagnosis Date    Headache     Headache(784.0)     migraine    Migraine 2018    TMJ (dislocation of temporomandibular joint)        PSH:  has a past surgical history that includes hx orthopaedic and hx cyst removal (2003). Allergies: has No Known Allergies. Meds:   Current Outpatient Medications   Medication Sig    ketorolac (TORADOL) 10 mg tablet TAKE 1 TABLET BY MOUTH EVERY 6 HOURS AS NEEDED FOR HEADACHE    buPROPion XL (WELLBUTRIN XL) 150 mg tablet TAKE 1 TABLET BY MOUTH EVERY MORNING    rizatriptan (MAXALT) 10 mg tablet Take 10 mg by mouth once as needed.  ondansetron (ZOFRAN ODT) 4 mg disintegrating tablet DISSOLVE ONE TABLET BY MOUTH EVERY 8 HOURS AS NEEDED FOR NAUSEA    senna (SENNA) 8.6 mg tablet Take 1 Tab by mouth daily. (Patient taking differently: Take 1 Tablet by mouth as needed.)     No current facility-administered medications for this visit. Fam hx: family history includes Alzheimer in her paternal grandmother; Cancer in her father, paternal grandfather, and paternal uncle; Glaucoma in her mother; Hypertension in her brother and mother; Stroke in her paternal grandmother. Soc hx:  reports that she has never smoked. She has never used smokeless tobacco. She reports current alcohol use. She reports that she does not use drugs.       Review of Systems - History obtained from the patient  General ROS: negative  Psychological ROS: negative  Ophthalmic ROS: negative  ENT ROS: negative  Respiratory ROS: no cough, shortness of breath, or wheezing  Cardiovascular ROS: no chest pain or dyspnea on exertion  Gastrointestinal ROS: no abdominal pain, change in bowel habits, or black or bloody stools  Genito-Urinary ROS: negative  Musculoskeletal ROS: negative  Neurological ROS: negative  Dermatological ROS: negative  OBJECTIVE:   Vitals:   Visit Vitals  /74   Pulse (!) 54   Temp 98.1 °F (36.7 °C) (Temporal)   Resp 18   Ht 5' 5\" (1.651 m)   Wt 172 lb (78 kg)   LMP 09/15/2021   SpO2 99%   BMI 28.62 kg/m²     Gen: Pleasant 49 y.o. female in NAD. NEURO: Alert and oriented x 3. Cranial nerves grossly intact. No focal sensory or motor deficits noted. SKIN: Warm. Dry. No rashes or other lesions noted. Female : normal external genitalia, no discharge, no lesions, no masses  Normal-appearing cervix and vagina    ASSESSMENT/ PLAN:     Diagnoses and all orders for this visit:    1. Pap smear for cervical cancer screening  -     PAP (IMAGE GUIDED), LIQUID-BASED; Future      1. Pap smear for cervical cancer screening   Ordered Pap (image guided), liquid-based for health maintenance. Follow-up and Dispositions    · Return in about 2 years (around 10/4/2023) for pap. I have reviewed the patient's medications and risks/side effects/benefits were discussed. Diagnosis(-es) explained to patient and questions answered. Literature provided where appropriate.      Written by Javier Pinedo, as dictated by Antonio Lucas MD.

## 2021-10-15 ENCOUNTER — HOSPITAL ENCOUNTER (EMERGENCY)
Age: 51
Discharge: HOME | End: 2021-10-15
Attending: EMERGENCY MEDICINE
Payer: COMMERCIAL

## 2021-10-15 VITALS
HEART RATE: 60 BPM | TEMPERATURE: 98.7 F | SYSTOLIC BLOOD PRESSURE: 140 MMHG | RESPIRATION RATE: 18 BRPM | WEIGHT: 172.4 LBS | HEIGHT: 65 IN | OXYGEN SATURATION: 100 % | BODY MASS INDEX: 28.72 KG/M2 | DIASTOLIC BLOOD PRESSURE: 90 MMHG

## 2021-10-15 DIAGNOSIS — G43.909 MIGRAINE WITHOUT STATUS MIGRAINOSUS, NOT INTRACTABLE, UNSPECIFIED MIGRAINE TYPE: Primary | ICD-10-CM

## 2021-10-15 PROCEDURE — 96374 THER/PROPH/DIAG INJ IV PUSH: CPT

## 2021-10-15 PROCEDURE — 96375 TX/PRO/DX INJ NEW DRUG ADDON: CPT

## 2021-10-15 PROCEDURE — 99282 EMERGENCY DEPT VISIT SF MDM: CPT

## 2021-10-15 PROCEDURE — 74011250636 HC RX REV CODE- 250/636: Performed by: EMERGENCY MEDICINE

## 2021-10-15 RX ORDER — DIPHENHYDRAMINE HYDROCHLORIDE 50 MG/ML
25 INJECTION, SOLUTION INTRAMUSCULAR; INTRAVENOUS
Status: COMPLETED | OUTPATIENT
Start: 2021-10-15 | End: 2021-10-15

## 2021-10-15 RX ORDER — KETOROLAC TROMETHAMINE 30 MG/ML
15 INJECTION, SOLUTION INTRAMUSCULAR; INTRAVENOUS
Status: COMPLETED | OUTPATIENT
Start: 2021-10-15 | End: 2021-10-15

## 2021-10-15 RX ORDER — PROCHLORPERAZINE EDISYLATE 5 MG/ML
10 INJECTION INTRAMUSCULAR; INTRAVENOUS
Status: COMPLETED | OUTPATIENT
Start: 2021-10-15 | End: 2021-10-15

## 2021-10-15 RX ADMIN — PROCHLORPERAZINE EDISYLATE 10 MG: 5 INJECTION INTRAMUSCULAR; INTRAVENOUS at 22:20

## 2021-10-15 RX ADMIN — KETOROLAC TROMETHAMINE 15 MG: 30 INJECTION, SOLUTION INTRAMUSCULAR at 22:20

## 2021-10-15 RX ADMIN — DIPHENHYDRAMINE HYDROCHLORIDE 25 MG: 50 INJECTION, SOLUTION INTRAMUSCULAR; INTRAVENOUS at 22:20

## 2021-10-15 RX ADMIN — SODIUM CHLORIDE 1000 ML: 9 INJECTION, SOLUTION INTRAVENOUS at 22:20

## 2021-10-16 NOTE — ED NOTES
Pt ambulatory to room from triage. Pt here today with complaints of HA that started around 4AM and was accompianied with nausea. Pt states that she has a history of migraines and took some medication at home with no relief. PT states that after the headache cocktail here today she feels better. Pt states that lights/sounds were bother her but no longer are.

## 2021-10-16 NOTE — ED NOTES
I have reviewed discharge instructions with the patient. The patient verbalized understanding. Pt encouraged to return to the ED if she develops any new/worsening symptoms. Pt denies any additional needs or complaints at this time.

## 2021-10-16 NOTE — ED PROVIDER NOTES
EMERGENCY DEPARTMENT HISTORY AND PHYSICAL EXAM      Date: 10/15/2021  Patient Name: Froylan Maciel    History of Presenting Illness     Chief Complaint   Patient presents with    Headache     ED visit d/t headache - onset of sxs, >12 hours ago - (R) sided pain with nausea - hx of migranes - Denies fevers / neck pain / sob / cp        History Provided By: Patient    HPI: Froylan Maciel, 49 y.o. female with significant PMHx for migraines, presents by POV to the ED with cc of a headache. She reports that she awoke at 4 AM with a migraine. She has a history of migraines and this is typical pattern to her her previous headaches. She reports phono and photophobia with nausea. There is been no vomiting, neck pain, fever, chills, or upper respiratory complaints. She took her previously prescribed medicines of ketorolac and bupropion at the onset of the headache. This helped until about 6 PM when the headache returned. She denies lightheadedness or dizziness. There are no other complaints, changes, or physical findings at this time. Social Hx: Tobacco (denies), EtOH (denies), Illicit drug use (denies)     PCP: Gio Resendiz MD    No current facility-administered medications on file prior to encounter. Current Outpatient Medications on File Prior to Encounter   Medication Sig Dispense Refill    ketorolac (TORADOL) 10 mg tablet TAKE 1 TABLET BY MOUTH EVERY 6 HOURS AS NEEDED FOR HEADACHE      buPROPion XL (WELLBUTRIN XL) 150 mg tablet TAKE 1 TABLET BY MOUTH EVERY MORNING 90 Tablet 1    rizatriptan (MAXALT) 10 mg tablet Take 10 mg by mouth once as needed.  ondansetron (ZOFRAN ODT) 4 mg disintegrating tablet DISSOLVE ONE TABLET BY MOUTH EVERY 8 HOURS AS NEEDED FOR NAUSEA 9 Tab 0    senna (SENNA) 8.6 mg tablet Take 1 Tab by mouth daily.  (Patient taking differently: Take 1 Tablet by mouth as needed.) 15 Tab 0       Past History     Past Medical History:  Past Medical History:   Diagnosis Date    Headache     Headache(784.0)     migraine    Migraine 2018    TMJ (dislocation of temporomandibular joint)        Past Surgical History:  Past Surgical History:   Procedure Laterality Date    HX CYST REMOVAL  2003    HX ORTHOPAEDIC      ganglion cyst removal       Family History:  Family History   Problem Relation Age of Onset    Stroke Paternal Grandmother     Alzheimer Paternal Grandmother     Cancer Paternal Grandfather         lung    Hypertension Mother     Glaucoma Mother     Cancer Father     Hypertension Brother     Cancer Paternal Uncle         pancreatic       Social History:  Social History     Tobacco Use    Smoking status: Never Smoker    Smokeless tobacco: Never Used   Substance Use Topics    Alcohol use: Yes     Alcohol/week: 0.0 standard drinks     Types: 5 - 6 Glasses of wine per week     Comment: few drinks weekly     Drug use: Never       Allergies:  No Known Allergies      Review of Systems   Review of Systems   Constitutional: Negative for chills, diaphoresis and fever. HENT: Negative for congestion, ear pain, rhinorrhea and sore throat. Respiratory: Negative for cough and shortness of breath. Cardiovascular: Negative for chest pain. Gastrointestinal: Positive for nausea. Negative for abdominal pain, constipation, diarrhea and vomiting. Genitourinary: Negative for difficulty urinating, dysuria, frequency and hematuria. Musculoskeletal: Negative for arthralgias and myalgias. Neurological: Positive for headaches. Negative for dizziness, seizures, syncope, weakness and numbness. All other systems reviewed and are negative. Physical Exam   Physical Exam  Vitals and nursing note reviewed. Constitutional:       General: She is not in acute distress. Appearance: She is well-developed. She is not diaphoretic. Comments: 49 y.o. -American female    HENT:      Head: Normocephalic and atraumatic. Eyes:      General:         Right eye: No discharge. Left eye: No discharge. Extraocular Movements: Extraocular movements intact. Right eye: Normal extraocular motion and no nystagmus. Left eye: Normal extraocular motion and no nystagmus. Conjunctiva/sclera: Conjunctivae normal.      Pupils: Pupils are equal, round, and reactive to light. Cardiovascular:      Rate and Rhythm: Normal rate and regular rhythm. Heart sounds: Normal heart sounds. No murmur heard. Pulmonary:      Effort: Pulmonary effort is normal. No respiratory distress. Breath sounds: Normal breath sounds. Musculoskeletal:         General: Normal range of motion. Cervical back: Normal range of motion and neck supple. Skin:     General: Skin is warm and dry. Neurological:      General: No focal deficit present. Mental Status: She is alert and oriented to person, place, and time. Cranial Nerves: Cranial nerves are intact. No cranial nerve deficit. Sensory: Sensation is intact. No sensory deficit. Motor: Motor function is intact. No weakness. Coordination: Coordination is intact. Coordination normal. Rapid alternating movements normal.      Gait: Gait is intact. Gait normal.   Psychiatric:         Behavior: Behavior normal.         Diagnostic Study Results     Labs - none    Radiologic Studies - none    Medical Decision Making   I am the first provider for this patient. I reviewed the vital signs, available nursing notes, past medical history, past surgical history, family history and social history. Vital Signs-Reviewed the patient's vital signs. Patient Vitals for the past 12 hrs:   Temp Pulse Resp BP SpO2   10/15/21 2053 98.7 °F (37.1 °C) 60 18 (!) 140/90 100 %       Records Reviewed: Nursing Notes and Old Medical Records    Provider Notes (Medical Decision Making):   Patient presents the ED with a typical migraine. Her vitals are stable. Her complaint was relieved with IV medications provided in the ED.   She already has previously prescribed medications to take at home if the headache returns. She should follow-up with primary care medicine or neurology return to the ED for deterioration. ED Course:   Initial assessment performed. The patients presenting problems have been discussed, and they are in agreement with the care plan formulated and outlined with them. I have encouraged them to ask questions as they arise throughout their visit. Critical Care Time: None    Disposition:  DISCHARGE NOTE:  10:49 PM  The pt is ready for discharge. The pt's signs, symptoms, diagnosis, and discharge instructions have been discussed and pt has conveyed their understanding. The pt is to follow up as recommended or return to ER should their symptoms worsen. Plan has been discussed and pt is in agreement. PLAN:  1. Current Discharge Medication List        2. Follow-up Information     Follow up With Specialties Details Why Contact Info    Jerome Salas MD Family Medicine  As needed, If not improved [facility]  [phone]      [facility] EMERGENCY DEPT Emergency Medicine  If symptoms worsen [address] [place] [address]  [address] [facility]  [phone]        Return to ED if worse     Diagnosis     Clinical Impression:   1. Migraine without status migrainosus, not intractable, unspecified migraine type          Please note that this dictation was completed with [employer]ision, the computer voice recognition software. Quite often unanticipated grammatical, syntax, homophones, and other interpretive errors are inadvertently transcribed by the computer software. Please disregards these errors. Please excuse any errors that have escaped final proofreading.

## 2021-10-16 NOTE — DISCHARGE INSTRUCTIONS
It was a pleasure taking care of you at [facility] today. We know that when you come to [facility], you are entrusting us with your health, comfort, and safety. Our physicians and nurses honor that trust, and we truly appreciate the opportunity to care for you and your loved ones. We also value your feedback. If you receive a survey about your Emergency Department experience today, please fill it out. We care about our patients' feedback, and we listen to what you have to say. Thank you!

## 2021-12-08 ENCOUNTER — OFFICE VISIT (OUTPATIENT)
Dept: NEUROLOGY | Age: 51
End: 2021-12-08
Payer: COMMERCIAL

## 2021-12-08 VITALS
BODY MASS INDEX: 28.66 KG/M2 | OXYGEN SATURATION: 99 % | HEIGHT: 65 IN | TEMPERATURE: 97.7 F | DIASTOLIC BLOOD PRESSURE: 82 MMHG | WEIGHT: 172 LBS | HEART RATE: 67 BPM | SYSTOLIC BLOOD PRESSURE: 118 MMHG

## 2021-12-08 DIAGNOSIS — E56.9 HYPOVITAMINOSIS: ICD-10-CM

## 2021-12-08 DIAGNOSIS — G44.031 INTRACTABLE EPISODIC PAROXYSMAL HEMICRANIA: ICD-10-CM

## 2021-12-08 DIAGNOSIS — G43.719 INTRACTABLE CHRONIC MIGRAINE WITHOUT AURA AND WITHOUT STATUS MIGRAINOSUS: Primary | ICD-10-CM

## 2021-12-08 DIAGNOSIS — G43.019 INTRACTABLE MIGRAINE WITHOUT AURA AND WITHOUT STATUS MIGRAINOSUS: ICD-10-CM

## 2021-12-08 DIAGNOSIS — M77.9 TENDONITIS: ICD-10-CM

## 2021-12-08 DIAGNOSIS — R42 DIZZINESS: ICD-10-CM

## 2021-12-08 PROCEDURE — 99205 OFFICE O/P NEW HI 60 MIN: CPT | Performed by: PSYCHIATRY & NEUROLOGY

## 2021-12-08 RX ORDER — DIPHENHYDRAMINE HCL 25 MG
25 TABLET ORAL AS NEEDED
COMMUNITY

## 2021-12-08 RX ORDER — BUTALBITAL, ACETAMINOPHEN AND CAFFEINE 50; 325; 40 MG/1; MG/1; MG/1
1 TABLET ORAL AS NEEDED
COMMUNITY
End: 2022-07-18 | Stop reason: SDUPTHER

## 2021-12-08 RX ORDER — RIZATRIPTAN BENZOATE 10 MG/1
TABLET, ORALLY DISINTEGRATING ORAL
COMMUNITY
Start: 2021-10-31 | End: 2022-05-12

## 2021-12-08 RX ORDER — PREDNISONE 10 MG/1
TABLET ORAL
Qty: 20 TABLET | Refills: 0 | Status: SHIPPED | OUTPATIENT
Start: 2021-12-08 | End: 2022-05-12

## 2021-12-08 RX ORDER — B2/MAGNESIUM CIT,OXID/FEVERFEW 200-180-50
1 TABLET ORAL DAILY
COMMUNITY
End: 2022-05-12

## 2021-12-08 NOTE — PROGRESS NOTES
Neurology Consult Note      HISTORY PROVIDED BY: patient    Chief Complaint:   Chief Complaint   Patient presents with    New Patient    Migraine      Subjective:    Marily Brittle is a 49 y.o. right handed female who presents in consultation for headaches. This is a 50-year-old right-handed black female with history of headache, diagnosed with migraine, TMJ, who presents to clinic with increased headache. Patient apparently was seen in the clinic by Dr. Romana Ying more than 3 years ago. Patient says she started having headache in her teenage years, was diagnosed with migraine. She says for more than 6 months, she has been having increased headache, initially patient attributed it to the Covid vaccine injection, because after the Covid vaccine she experienced a lot of headache, however the headache continues. She has been to the emergency room multiple times for the headache. Patient says headache is mostly starting from the right side of the back of the head, going behind the eye causing some eye pain, frequency has been 3-4 times a week. Headache is throbbing in nature, occasional sharp pain. Associated with photophobia, phonophobia, nausea, occasional vomiting. Patient says sometimes smell makes headache worse. Patient is concerned because the headache has increased in both frequency and intensity and has not been the same way used to be. She says there is no relieving or aggravating factor. No family history of migraine headache. Patient has been on [address] which she gets her online for the release of her headache, currently uses Maxalt for abortive medication but she says that have not been helping much. She has to go to the emergency room to get cocktail to get some relief.   She denies obvious loss of consciousness, dysphagia, or odynophagia  Review of Systems - General ROS: positive for  - fatigue and sleep disturbance  Psychological ROS: positive for - anxiety and sleep disturbances  Ophthalmic ROS: positive for - blurry vision and photophobia  ENT ROS: positive for - headaches and visual changes  Allergy and Immunology ROS: negative  Hematological and Lymphatic ROS: negative  Endocrine ROS: negative  Respiratory ROS: no cough, shortness of breath, or wheezing  Cardiovascular ROS: no chest pain or dyspnea on exertion  Gastrointestinal ROS: no abdominal pain, change in bowel habits, or black or bloody stools  Genito-Urinary ROS: no dysuria, trouble voiding, or hematuria  Musculoskeletal ROS: negative  Neurological ROS: positive for - dizziness, headaches and visual changes  Dermatological ROS: negative  Due to the fact that patient's headache change in the way it used to be, I will obtain MRI of the brain without gadolinium to evaluate for intracranial lesion. Past Medical History:   Diagnosis Date    Headache     Headache(784.0)     migraine    Migraine 2018    TMJ (dislocation of temporomandibular joint)       Past Surgical History:   Procedure Laterality Date    HX CYST REMOVAL  2003    HX ORTHOPAEDIC      ganglion cyst removal      Social History     Socioeconomic History    Marital status:      Spouse name: Not on file    Number of children: Not on file    Years of education: Not on file    Highest education level: Not on file   Occupational History    Occupation: marketing   Tobacco Use    Smoking status: Never Smoker    Smokeless tobacco: Never Used   Substance and Sexual Activity    Alcohol use:  Yes     Alcohol/week: 0.0 standard drinks     Types: 5 - 6 Glasses of wine per week     Comment: few drinks weekly     Drug use: Never    Sexual activity: Yes     Partners: Male     Birth control/protection: Rhythm   Other Topics Concern     Service Not Asked    Blood Transfusions Not Asked    Caffeine Concern Not Asked    Occupational Exposure Not Asked    Hobby Hazards Not Asked    Sleep Concern No    Stress Concern Yes    Weight Concern No    Special Diet Yes    Back Care Not Asked    Exercise Yes    Bike Helmet Not Asked    Seat Belt Yes    Self-Exams Yes   Social History Narrative    Not on file     Social Determinants of Health     Financial Resource Strain:     Difficulty of Paying Living Expenses: Not on file   Food Insecurity:     Worried About Running Out of Food in the Last Year: Not on file    Marsha of Food in the Last Year: Not on file   Transportation Needs:     Lack of Transportation (Medical): Not on file    Lack of Transportation (Non-Medical):  Not on file   Physical Activity:     Days of Exercise per Week: Not on file    Minutes of Exercise per Session: Not on file   Stress:     Feeling of Stress : Not on file   Social Connections:     Frequency of Communication with Friends and Family: Not on file    Frequency of Social Gatherings with Friends and Family: Not on file    Attends [employer] Services: Not on file    Active Member of [address] [place] [address] [employer] or Organizations: Not on file    Attends Club or Organization Meetings: Not on file    Marital Status: Not on file   Intimate Partner Violence:     Fear of Current or Ex-Partner: Not on file    Emotionally Abused: Not on file    Physically Abused: Not on file    Sexually Abused: Not on file   Housing Stability:     Unable to Pay for Housing in the Last Year: Not on file    Number of [place] in the Last Year: Not on file    Unstable Housing in the Last Year: Not on file     Family History   Problem Relation Age of Onset    Stroke Paternal Grandmother     Alzheimer's Disease Paternal Grandmother     Cancer Paternal Grandfather         lung    Hypertension Mother     Glaucoma Mother     Other Mother         shingles    Cancer Father     Other Father         quadriplegic from motor vehicle accident    Hypertension Brother     Cancer Paternal Uncle         pancreatic         Objective:   ROS  As per HPI    No Known Allergies     Meds:  Outpatient Medications Prior to Visit   Medication Sig Dispense Refill    rizatriptan (MAXALT-MLT) 10 mg disintegrating tablet       diphenhydrAMINE (Benadryl Allergy) 25 mg tablet Take 25 mg by mouth as needed. Takes with Benadryl and Toradol when she has to take them      B2-magnesium cit,oxid-feverfew (MigreLief) 200-180-50 mg tab Take 1 Tablet by mouth daily.  butalbital-acetaminophen-caffeine (FIORICET, ESGIC) -40 mg per tablet Take 1 Tablet by mouth as needed for Headache. Takes with Benadryl and Toradol when she has to take them      ketorolac (TORADOL) 10 mg tablet Take 10 mg by mouth as needed. Takes with Benadryl and Toradol when she has to take them      buPROPion XL (WELLBUTRIN XL) 150 mg tablet TAKE 1 TABLET BY MOUTH EVERY MORNING 90 Tablet 1    ondansetron (ZOFRAN ODT) 4 mg disintegrating tablet DISSOLVE ONE TABLET BY MOUTH EVERY 8 HOURS AS NEEDED FOR NAUSEA 9 Tab 0    rizatriptan (MAXALT) 10 mg tablet Take 10 mg by mouth once as needed. (Patient not taking: Reported on 12/8/2021)      senna (SENNA) 8.6 mg tablet Take 1 Tab by mouth daily. (Patient not taking: Reported on 12/8/2021) 15 Tab 0     No facility-administered medications prior to visit. Imaging:  MRI Results (most recent):  No results found for this or any previous visit. CT Results (most recent):  No results found for this or any previous visit. Reviewed records in [facility] tab today    Lab Review   Results for orders placed or performed in visit on 08/02/21   HEPATITIS C AB   Result Value Ref Range    Hep C virus Ab Interp.  NONREACTIVE NONREACTIVE      Hep C  virus Ab comment Method used is Siemens Advia Centaur     SED RATE (ESR)   Result Value Ref Range    Sed rate, automated 5 0 - 20 mm/hr   CBC WITH AUTOMATED DIFF   Result Value Ref Range    WBC 4.6 3.6 - 11.0 K/uL    RBC 4.76 3.80 - 5.20 M/uL    HGB 13.1 11.5 - 16.0 g/dL    HCT 41.6 35.0 - 47.0 %    MCV 87.4 80.0 - 99.0 FL    MCH 27.5 26.0 - 34.0 PG    MCHC 31.5 30.0 - 36.5 g/dL    RDW 13.1 11.5 - 14.5 %    PLATELET 775 642 - 288 K/uL    MPV 11.6 8.9 - 12.9 FL    NRBC 0.0 0  WBC    ABSOLUTE NRBC 0.00 0.00 - 0.01 K/uL    NEUTROPHILS 55 32 - 75 %    LYMPHOCYTES 33 12 - 49 %    MONOCYTES 9 5 - 13 %    EOSINOPHILS 2 0 - 7 %    BASOPHILS 1 0 - 1 %    IMMATURE GRANULOCYTES 0 0.0 - 0.5 %    ABS. NEUTROPHILS 2.6 1.8 - 8.0 K/UL    ABS. LYMPHOCYTES 1.5 0.8 - 3.5 K/UL    ABS. MONOCYTES 0.4 0.0 - 1.0 K/UL    ABS. EOSINOPHILS 0.1 0.0 - 0.4 K/UL    ABS. BASOPHILS 0.0 0.0 - 0.1 K/UL    ABS. IMM. GRANS. 0.0 0.00 - 0.04 K/UL    DF AUTOMATED     METABOLIC PANEL, COMPREHENSIVE   Result Value Ref Range    Sodium 139 136 - 145 mmol/L    Potassium 4.3 3.5 - 5.1 mmol/L    Chloride 107 97 - 108 mmol/L    CO2 27 21 - 32 mmol/L    Anion gap 5 5 - 15 mmol/L    Glucose 85 65 - 100 mg/dL    BUN 11 6 - 20 MG/DL    Creatinine 0.76 0.55 - 1.02 MG/DL    BUN/Creatinine ratio 14 12 - 20      GFR est AA >60 >60 ml/min/1.73m2    GFR est non-AA >60 >60 ml/min/1.73m2    Calcium 8.7 8.5 - 10.1 MG/DL    Bilirubin, total 0.5 0.2 - 1.0 MG/DL    ALT (SGPT) 21 12 - 78 U/L    AST (SGOT) 24 15 - 37 U/L    Alk.  phosphatase 74 45 - 117 U/L    Protein, total 6.5 6.4 - 8.2 g/dL    Albumin 3.7 3.5 - 5.0 g/dL    Globulin 2.8 2.0 - 4.0 g/dL    A-G Ratio 1.3 1.1 - 2.2     LIPID PANEL   Result Value Ref Range    Cholesterol, total 179 <200 MG/DL    Triglyceride 62 <150 MG/DL    HDL Cholesterol 99 MG/DL    LDL, calculated 67.6 0 - 100 MG/DL    VLDL, calculated 12.4 MG/DL    CHOL/HDL Ratio 1.8 0.0 - 5.0     AMB POC URINALYSIS DIP STICK AUTO W/O MICRO   Result Value Ref Range    Color (UA POC) Yellow     Clarity (UA POC) Clear     Glucose (UA POC) Negative Negative    Bilirubin (UA POC) Negative Negative    Ketones (UA POC) Negative Negative    Specific gravity (UA POC) 1.030 1.001 - 1.035    Blood (UA POC) Negative Negative    pH (UA POC) 7.0 4.6 - 8.0    Protein (UA POC) Trace Negative    Urobilinogen (UA POC) 0.2 mg/dL 0.2 - 1    Nitrites (UA POC) Negative Negative    Leukocyte esterase (UA POC) Negative Negative        Exam:  Visit Vitals  /82   Pulse 67   Temp 97.7 °F (36.5 °C)   Ht 5' 5\" (1.651 m)   Wt 172 lb (78 kg)   SpO2 99%   BMI 28.62 kg/m²     General:  Alert, cooperative, no distress. Head:  Normocephalic, without obvious abnormality, atraumatic. Respiratory:  Heart:   Non labored breathing  Regular rate and rhythm, no murmurs   Neck:   2+ carotids, no bruits   Extremities: Warm, no cyanosis or edema. Pulses: 2+ radial pulses. Neurologic:  MS: Alert and oriented x 4, speech intact. Language intact, able to name, repeat, and follow all commands. Attention and fund of knowledge appropriate. Recent and remote memory intact. Cranial Nerves:  II: visual fields Full to confrontation   II: pupils Equal, round, reactive to light   II: optic disc No papilledema   III,VII: ptosis none   III,IV,VI: extraocular muscles  EOMI, no nystagmus or diplopia   V: facial light touch sensation  normal   VII: facial muscle function   symmetric   VIII: hearing intact   IX: soft palate elevation  normal   XI: trapezius strength  5/5   XI: sternocleidomastoid strength 5/5   XII: tongue  Midline     Motor: normal bulk and tone, no tremor              Strength: 5/5 throughout, no PD  Sensory: Equivocal sensation to LT, PP, temperature and vibration  Coordination: FTN and HTS intact, STEW intact,Romberg negative  Gait: normal gait, able to heel, toe, and tandem walk  Reflexes: 2+ symmetric. Plantar: Mute           Assessment/Plan       ICD-10-CM ICD-9-CM    1. Intractable chronic migraine without aura and without status migrainosus  G43.719 346.71 MRI BRAIN W WO CONT   2. Intractable episodic paroxysmal hemicrania  G44.031 339.03 MRI BRAIN W WO CONT      PROTEIN ELECTROPHORESIS      ANGIOTENSIN CONVERTING ENZYME   3. Intractable migraine without aura and without status migrainosus  G43.019 346.11    4.  Tendonitis  M77.9 726.90 MRI BRAIN W WO CONT ALDOLASE      VITAMIN B12      RHEUMATOID FACTOR, QL      ANNA, DIRECT, W/REFLEX      PROTEIN ELECTROPHORESIS      ANGIOTENSIN CONVERTING ENZYME      SED RATE (ESR)      VITAMIN D, 25 HYDROXY      RHEUMATOID FACTOR, QL   5. Dizziness  R42 780.4 MRI BRAIN W WO CONT      ALDOLASE      VITAMIN B12      RHEUMATOID FACTOR, QL      ANNA, DIRECT, W/REFLEX      PROTEIN ELECTROPHORESIS      ANGIOTENSIN CONVERTING ENZYME      SED RATE (ESR)      VITAMIN D, 25 HYDROXY      RHEUMATOID FACTOR, QL   6. Hypovitaminosis  E56.9 269.2 VITAMIN B12      VITAMIN D, 25 HYDROXY       Follow-up and Dispositions    · Return in about 3 months (around 3/8/2022). Plan:  Prednisone 10 mg p.o. taper  Maxalt MLT 10 mg p.o. as needed for severe headache  Patient to continue on her prophylaxis  MRI of the brain with and without gadolinium  Blood protein work-up, ESR, vitamin B12, vitamin D, CK, aldolase    Thank you very much for this consultation.        Signed:  Jina Coronel MD  12/8/2021

## 2021-12-08 NOTE — PATIENT INSTRUCTIONS
Office Policies     · Phone calls/patient messages:  Please allow up to 24 hours for someone in the office to contact you about your call or message. Be mindful your provider may be out of the office or your message may require further review. We encourage you to use [employer] for your messages as this is a faster, more efficient way to communicate with our office     · Medication Refills:  Prescription medications require up to 48 business hours to process. We encourage you to use [employer] for your refills. For controlled medications: Please allow up to 72 business hours to process. Certain medications may require you to  a written prescription at our office. NO narcotic/controlled medications will be prescribed after 4pm Monday through Friday or on weekends     · Form/Paperwork Completion:  We ask that you allow 7-14 business days. You may also download your forms to [employer] to have your doctor print off. Due to COVID-19, please note there may be a longer wait time than usual. Thank you.      If you have questions/concerns regarding your health maintenance, please reach out to your primary care physician

## 2021-12-09 ENCOUNTER — TELEPHONE (OUTPATIENT)
Dept: NEUROLOGY | Age: 51
End: 2021-12-09

## 2021-12-09 LAB — RHEUMATOID FACT SERPL-ACNC: <10 IU/ML (ref 0–15)

## 2021-12-09 NOTE — TELEPHONE ENCOUNTER
Pt is thinking about getting COVID booster next week. Wants to make sure the Prednisone given yesterday will not interfere. Should she hold off on the shot? Pt will not started taking prednisone until she hears from us.  [phone]

## 2021-12-14 ENCOUNTER — TELEPHONE (OUTPATIENT)
Dept: NEUROLOGY | Age: 51
End: 2021-12-14

## 2021-12-14 NOTE — TELEPHONE ENCOUNTER
Patient is having an MRI and she wants to go to [place], [place] and their fax is [phone]. A new order is going to have to be sent.

## 2022-01-04 RX ORDER — ERGOCALCIFEROL 1.25 MG/1
50000 CAPSULE ORAL
Qty: 4 CAPSULE | Refills: 3 | Status: SHIPPED | OUTPATIENT
Start: 2022-01-04 | End: 2022-05-17 | Stop reason: SDUPTHER

## 2022-01-31 RX ORDER — RIZATRIPTAN BENZOATE 10 MG/1
10 TABLET, ORALLY DISINTEGRATING ORAL
Status: CANCELLED | OUTPATIENT
Start: 2022-01-31 | End: 2022-01-31

## 2022-01-31 NOTE — TELEPHONE ENCOUNTER
----- Message from Holden Leblanc sent at 1/31/2022  9:11 AM EST -----  Regarding: Rizatriptan prescription refill  Good morning Dr. Cheryle Hof,  Would you please call in a refill for rozatriptan to the [facility] pharmacy on [address] [place] [address]? I ran out yesterday.    Their number is [phone]    Thank you

## 2022-01-31 NOTE — TELEPHONE ENCOUNTER
Future Appointments:  Future Appointments   Date Time Provider Jen Augustin   3/16/2022  8:40 AM Clair Ma MD [facility] AMB   8/3/2022  8:40 AM Poornima Lee MD [facility] AM[facility]        Last Appointment With Me:  10/4/2021     Requested Prescriptions     Pending Prescriptions Disp Refills    rizatriptan (MAXALT-MLT) 10 mg disintegrating tablet       Sig: Take 1 Tablet by mouth once as needed for Migraine for up to 1 dose.

## 2022-02-24 ENCOUNTER — VIRTUAL VISIT (OUTPATIENT)
Dept: INTERNAL MEDICINE CLINIC | Age: 52
End: 2022-02-24
Payer: COMMERCIAL

## 2022-02-24 DIAGNOSIS — R21 SKIN RASH IN PELVIC REGION: Primary | ICD-10-CM

## 2022-02-24 PROCEDURE — 99213 OFFICE O/P EST LOW 20 MIN: CPT | Performed by: FAMILY MEDICINE

## 2022-02-24 NOTE — PROGRESS NOTES
Identified pt with two pt identifiers(name and ). Reviewed record in preparation for visit and have obtained necessary documentation. No chief complaint on file. There were no vitals filed for this visit. Health Maintenance Due   Topic    DTaP/Tdap/Td series (1 - Tdap)    Colorectal Cancer Screening Combo     Flu Vaccine (1)    Shingrix Vaccine Age 50> (1 of 2)    COVID-19 Vaccine (3 - Booster for Moderna series)       Coordination of Care Questionnaire:  :   1) Have you been to an emergency room, urgent care, or hospitalized since your last visit? If yes, where when, and reason for visit? no       2. Have seen or consulted any other health care provider since your last visit? If yes, where when, and reason for visit? NO      An electronic signature was used to authenticate this note.   -- Sophie Ordaz LPN

## 2022-02-24 NOTE — PROGRESS NOTES
Phyllis Esquivel is a 49 y.o. female who was seen by synchronous (real-time) audio-video technology on 2/24/2022 for Skin Problem (anal itching. )        Assessment & Plan:   Diagnoses and all orders for this visit:    1. Skin rash in pelvic region  -     clotrimazole (GYNE-LOTRIMIN) 2 % vaginal cream; Apply to the affected areas every evening. Skin Rash in Pelvic Region: I advised the pt to use Polysporin on the outside area. I rx'd Clotrimazole. I will see her in office on Monday to examine the area. Subjective:     She presents with a c/o of itch starting since the previous summer. She went to a dermatologist, who advised her to go use Hydrocortisone. The hydrocortisone has been ineffective. Previously, she has been riding her bike more frequently, and was concerned with it being jockage. The dermatologist denied said it was not jockage. She uses ice to soothe the discomfort. She tried Preparation H, which causes burning. She has been keeping the area dry. She has been using cottenelle wet wipes. The itching can wake up her at night. She notes the rash as mainly bumps and erythema around the vagina area. The rash extends from her perineum to her thighs, and it is also present on her glutes. She denies discharge and vaginal itchiness. She describes the rash as a burning and crawling sensation on her skin. She denies using a pad or lining, and primarily uses a tampon. Prior to Admission medications    Medication Sig Start Date End Date Taking? Authorizing Provider   clotrimazole (GYNE-LOTRIMIN) 2 % vaginal cream Apply to the affected areas every evening. 2/24/22  Yes Poornima Lee MD   ergocalciferol (ERGOCALCIFEROL) 1,250 mcg (50,000 unit) capsule Take 1 Capsule by mouth every seven (7) days. 1/4/22  Yes Surjit Ma MD   rizatriptan (MAXALT-MLT) 10 mg disintegrating tablet  10/31/21  Yes Provider, Historical   diphenhydrAMINE (Benadryl Allergy) 25 mg tablet Take 25 mg by mouth as needed.  Takes with Benadryl and Toradol when she has to take them   Yes Provider, Historical   B2-magnesium cit,oxid-feverfew (MigreLief) 200-180-50 mg tab Take 1 Tablet by mouth daily. Yes Provider, Historical   butalbital-acetaminophen-caffeine (FIORICET, ESGIC) -40 mg per tablet Take 1 Tablet by mouth as needed for Headache. Takes with Benadryl and Toradol when she has to take them   Yes Provider, Historical   predniSONE (DELTASONE) 10 mg tablet Take 1 tab p.o tid x3 days, 1 tab p.o bid x4 days, 1 p.o every day x3 days 12/8/21  Yes Surjit Ma MD   ketorolac (TORADOL) 10 mg tablet Take 10 mg by mouth as needed. Takes with Benadryl and Toradol when she has to take them 9/2/21  Yes Provider, Historical   buPROPion XL (WELLBUTRIN XL) 150 mg tablet TAKE 1 TABLET BY MOUTH EVERY MORNING 9/27/21  Yes Timothy Chan MD   ondansetron (ZOFRAN ODT) 4 mg disintegrating tablet DISSOLVE ONE TABLET BY MOUTH EVERY 8 HOURS AS NEEDED FOR NAUSEA 12/23/20  Yes Timothy Chan MD   rizatriptan (MAXALT) 10 mg tablet Take 10 mg by mouth once as needed. Patient not taking: Reported on 12/8/2021    Provider, Historical   senna (SENNA) 8.6 mg tablet Take 1 Tab by mouth daily. Patient not taking: Reported on 12/8/2021 10/18/18   Marilia Arellano MD     Patient Active Problem List    Diagnosis Date Noted    Insomnia 11/13/2015    Migraine 11/13/2015     Current Outpatient Medications   Medication Sig Dispense Refill    clotrimazole (GYNE-LOTRIMIN) 2 % vaginal cream Apply to the affected areas every evening. 21 g 1    ergocalciferol (ERGOCALCIFEROL) 1,250 mcg (50,000 unit) capsule Take 1 Capsule by mouth every seven (7) days. 4 Capsule 3    rizatriptan (MAXALT-MLT) 10 mg disintegrating tablet       diphenhydrAMINE (Benadryl Allergy) 25 mg tablet Take 25 mg by mouth as needed. Takes with Benadryl and Toradol when she has to take them      B2-magnesium cit,oxid-feverfew (MigreLief) 200-180-50 mg tab Take 1 Tablet by mouth daily.       butalbital-acetaminophen-caffeine (FIORICET, ESGIC) -40 mg per tablet Take 1 Tablet by mouth as needed for Headache. Takes with Benadryl and Toradol when she has to take them      predniSONE (DELTASONE) 10 mg tablet Take 1 tab p.o tid x3 days, 1 tab p.o bid x4 days, 1 p.o every day x3 days 20 Tablet 0    ketorolac (TORADOL) 10 mg tablet Take 10 mg by mouth as needed. Takes with Benadryl and Toradol when she has to take them      buPROPion XL (WELLBUTRIN XL) 150 mg tablet TAKE 1 TABLET BY MOUTH EVERY MORNING 90 Tablet 1    ondansetron (ZOFRAN ODT) 4 mg disintegrating tablet DISSOLVE ONE TABLET BY MOUTH EVERY 8 HOURS AS NEEDED FOR NAUSEA 9 Tab 0    rizatriptan (MAXALT) 10 mg tablet Take 10 mg by mouth once as needed. (Patient not taking: Reported on 12/8/2021)      senna (SENNA) 8.6 mg tablet Take 1 Tab by mouth daily. (Patient not taking: Reported on 12/8/2021) 15 Tab 0     No Known Allergies  Past Medical History:   Diagnosis Date    Headache     Headache(784.0)     migraine    Migraine 2018    TMJ (dislocation of temporomandibular joint)      Past Surgical History:   Procedure Laterality Date    HX CYST REMOVAL  2003    HX ORTHOPAEDIC      ganglion cyst removal     Family History   Problem Relation Age of Onset    Stroke Paternal [de-identified]     Alzheimer's Disease Paternal Grandmother     Cancer Paternal Grandfather         lung    Hypertension Mother     Glaucoma Mother     Other Mother         shingles    Cancer Father     Other Father         quadriplegic from motor vehicle accident    Hypertension Brother     Cancer Paternal Uncle         pancreatic     Social History     Tobacco Use    Smoking status: Never Smoker    Smokeless tobacco: Never Used   Substance Use Topics    Alcohol use: Yes     Alcohol/week: 0.0 standard drinks     Types: 5 - 6 Glasses of wine per week     Comment: few drinks weekly        Review of Systems   Constitutional: Negative. HENT: Negative.     Eyes: Negative. Respiratory: Negative. Cardiovascular: Negative. Gastrointestinal: Negative. Genitourinary: Negative. Musculoskeletal: Negative. Skin: Positive for itching and rash. Neurological: Negative. Endo/Heme/Allergies: Negative. Psychiatric/Behavioral: Negative. Objective:     Patient-Reported Vitals 2/24/2022   Patient-Reported Weight 179lb   Patient-Reported Height -   Patient-Reported LMP -        [INSTRUCTIONS:  \"[x]\" Indicates a positive item  \"[]\" Indicates a negative item  -- DELETE ALL ITEMS NOT EXAMINED]    Constitutional: [x] Appears well-developed and well-nourished [x] No apparent distress      [] Abnormal -     Mental status: [x] Alert and awake  [x] Oriented to person/place/time [x] Able to follow commands    [] Abnormal -     Eyes:   EOM    [x]  Normal    [] Abnormal -   Sclera  [x]  Normal    [] Abnormal -          Discharge [x]  None visible   [] Abnormal -     HENT: [x] Normocephalic, atraumatic  [] Abnormal -   [x] Mouth/Throat: Mucous membranes are moist    External Ears [x] Normal  [] Abnormal -    Neck: [x] No visualized mass [] Abnormal -     Pulmonary/Chest: [x] Respiratory effort normal   [x] No visualized signs of difficulty breathing or respiratory distress        [] Abnormal -      Musculoskeletal:   [x] Normal gait with no signs of ataxia         [x] Normal range of motion of neck        [] Abnormal -     Neurological:        [x] No Facial Asymmetry (Cranial nerve 7 motor function) (limited exam due to video visit)          [x] No gaze palsy        [] Abnormal -          Skin:        [x] No significant exanthematous lesions or discoloration noted on facial skin         [] Abnormal -            Psychiatric:       [x] Normal Affect [] Abnormal -        [x] No Hallucinations    Other pertinent observable physical exam findings:-        We discussed the expected course, resolution and complications of the diagnosis(es) in detail.   Medication risks, benefits, costs, interactions, and alternatives were discussed as indicated. I advised her to contact the office if her condition worsens, changes or fails to improve as anticipated. She expressed understanding with the diagnosis(es) and plan. Kevan Martines, was evaluated through a synchronous (real-time) audio-video encounter. The patient (or guardian if applicable) is aware that this is a billable service, which includes applicable co-pays. Verbal consent to proceed has been obtained. The visit was conducted pursuant to the emergency declaration under the [address] [place] [address], [address] [place] [address] authority and the [employer] Act. Patient identification was verified, and a caregiver was present when appropriate. The patient was located at home in a state where the provider was licensed to provide care.     Written by Edwin Posey, as dictated by Oleg Oliva MD.    Martina Felix

## 2022-02-28 ENCOUNTER — OFFICE VISIT (OUTPATIENT)
Dept: INTERNAL MEDICINE CLINIC | Age: 52
End: 2022-02-28
Payer: COMMERCIAL

## 2022-02-28 VITALS
TEMPERATURE: 97.7 F | HEART RATE: 59 BPM | HEIGHT: 65 IN | DIASTOLIC BLOOD PRESSURE: 79 MMHG | WEIGHT: 158 LBS | BODY MASS INDEX: 26.33 KG/M2 | RESPIRATION RATE: 16 BRPM | SYSTOLIC BLOOD PRESSURE: 120 MMHG | OXYGEN SATURATION: 99 %

## 2022-02-28 DIAGNOSIS — R21 SKIN RASH IN PELVIC REGION: Primary | ICD-10-CM

## 2022-02-28 PROCEDURE — 99214 OFFICE O/P EST MOD 30 MIN: CPT | Performed by: FAMILY MEDICINE

## 2022-02-28 RX ORDER — FLUCONAZOLE 150 MG/1
TABLET ORAL
Qty: 2 TABLET | Refills: 0 | Status: SHIPPED | OUTPATIENT
Start: 2022-02-28 | End: 2022-05-12

## 2022-02-28 NOTE — PROGRESS NOTES
SUBJECTIVE:   Ms. Capo Silverman is a 49 y.o. female who is here for follow up of routine medical issues. She experiences chafing of the genital area for the past 2 weeks. The area has been extremely itchy. She has used many OTC creams w/o improvement in irritation. She complains of a hemorrhoids flare up since she saw some blood from stool yesterday. She reported she was on her period, and used a tampon. Previously, I sent her a prescription of Gyne-Lotrimin, which stung, so she only used it once. At this time, she is otherwise doing well and has brought no other complaints to my attention today. For a list of the medical issues addressed today, see the assessment and plan below. PMH:   Past Medical History:   Diagnosis Date    Headache     Headache(784.0)     migraine    Migraine 2018    TMJ (dislocation of temporomandibular joint)      PSH:  has a past surgical history that includes hx orthopaedic and hx cyst removal (2003). All: has No Known Allergies. MEDS:   Current Outpatient Medications   Medication Sig    fluconazole (DIFLUCAN) 150 mg tablet Take one tablet every 72 hours.  clotrimazole (GYNE-LOTRIMIN) 2 % vaginal cream Apply to the affected areas every evening.  ergocalciferol (ERGOCALCIFEROL) 1,250 mcg (50,000 unit) capsule Take 1 Capsule by mouth every seven (7) days.  rizatriptan (MAXALT-MLT) 10 mg disintegrating tablet     diphenhydrAMINE (Benadryl Allergy) 25 mg tablet Take 25 mg by mouth as needed. Takes with Benadryl and Toradol when she has to take them    B2-magnesium cit,oxid-feverfew (MigreLief) 200-180-50 mg tab Take 1 Tablet by mouth daily.  butalbital-acetaminophen-caffeine (FIORICET, ESGIC) -40 mg per tablet Take 1 Tablet by mouth as needed for Headache.  Takes with Benadryl and Toradol when she has to take them    predniSONE (DELTASONE) 10 mg tablet Take 1 tab p.o tid x3 days, 1 tab p.o bid x4 days, 1 p.o every day x3 days    ketorolac (TORADOL) 10 mg tablet Take 10 mg by mouth as needed. Takes with Benadryl and Toradol when she has to take them    buPROPion XL (WELLBUTRIN XL) 150 mg tablet TAKE 1 TABLET BY MOUTH EVERY MORNING    ondansetron (ZOFRAN ODT) 4 mg disintegrating tablet DISSOLVE ONE TABLET BY MOUTH EVERY 8 HOURS AS NEEDED FOR NAUSEA    senna (SENNA) 8.6 mg tablet Take 1 Tab by mouth daily.  rizatriptan (MAXALT) 10 mg tablet Take 10 mg by mouth once as needed. (Patient not taking: Reported on 12/8/2021)     No current facility-administered medications for this visit. FH: family history includes Alzheimer's Disease in her paternal grandmother; Cancer in her father, paternal grandfather, and paternal uncle; Glaucoma in her mother; Hypertension in her brother and mother; Other in her father and mother; Stroke in her paternal grandmother. SH:  reports that she has never smoked. She has never used smokeless tobacco. She reports current alcohol use. She reports that she does not use drugs.      Review of Systems - History obtained from the patient  General ROS: no fever, chills, fatigue, body aches  Psychological ROS: no change in anxiety, depression, SI/HI  Ophthalmic ROS: no blurred vision, myopia, double vision  ENT ROS: no dysphagia, otalgia, otorrhea, rhinorrhea, post nasal drip  Respiratory ROS: no cough, shortness of breath, or wheezing  Cardiovascular ROS: no chest pain or dyspnea on exertion  Gastrointestinal ROS: no abdominal pain, change in bowel habits, or black or bloody stools  Genito-Urinary ROS: no frequency, urgency, incontinence, dysuria, hematouria  Musculoskeletal ROS: no arthralagia, myalgia  Neurological ROS: no headaches, dizziness, lightheadedness, tremors, seizures  Dermatological ROS: no rash or lesions    OBJECTIVE:   Vitals:   Visit Vitals  /79 (BP 1 Location: Right arm, BP Patient Position: Sitting, BP Cuff Size: Small adult)   Pulse (!) 59   Temp 97.7 °F (36.5 °C) (Temporal)   Resp 16   Ht 5' 5\" (1.651 m) Wt 158 lb (71.7 kg)   SpO2 99%   BMI 26.29 kg/m²      Gen: Pleasant 49 y.o.  female in NAD. HEENT: PERRLA. EOMI. OP moist and pink. Neck: Supple. No LAD. HEART: RRR, No M/G/R.      LUNGS: CTAB No W/R. ABDOMEN: S, NT, ND, BS+. EXTREMITIES: Warm. No C/C/E.    MUSCULOSKELETAL: Normal ROM, muscle strength 5/5 all groups. NEURO: Alert and oriented x 3. Cranial nerves grossly intact. No focal sensory or motor deficits noted. SKIN: Warm. Dry. No rashes or other lesions noted. Genital:  Her skin was smooth without lesions. Her rash was noted to occur in the groin over the genitals and peritoneal extending over the buttocks, where it was noted to have a gray cast and was chafed. She was extremely itchy. ASSESSMENT/ PLAN: Diagnoses and all orders for this visit:    1. Skin rash in pelvic region  -     fluconazole (DIFLUCAN) 150 mg tablet; Take one tablet every 72 hours. -     NUSWAB VAGINITIS PLUS; Future        ICD-10-CM ICD-9-CM    1. Skin rash in pelvic region  R21 782.1 fluconazole (DIFLUCAN) 150 mg tablet      NUSWAB VAGINITIS PLUS        Follow-up and Dispositions    · Return if symptoms worsen or fail to improve. Skin Rash in Pelvic Region: I did a nuswab. There was a smaller thicker white discharge noted on the vaginal wall. In the posterior vaginal area, there was an erythematous rash and weeping blood the size of a nickel. We will follow up in 3 weeks to check for healing. I rx'd oral diflucan and Anusol HC for hemorrhoids. I have reviewed the patient's medications and risks/side effects/benefits were discussed. Diagnosis(-es) explained to patient and questions answered. Literature provided where appropriate.      Written by Erik Davison, as dictated by Kendall Chaney MD.

## 2022-02-28 NOTE — PROGRESS NOTES
Identified pt with two pt identifiers(name and ). Reviewed record in preparation for visit and have obtained necessary documentation. Chief Complaint   Patient presents with    Follow-up     needs pelvic exam. had virtual visit last week. Vitals:    22 1544   BP: 120/79   Pulse: (!) 59   Resp: 16   Temp: 97.7 °F (36.5 °C)   TempSrc: Temporal   SpO2: 99%   Weight: 158 lb (71.7 kg)   Height: 5' 5\" (1.651 m)   PainSc:   0 - No pain       Health Maintenance Due   Topic    DTaP/Tdap/Td series (1 - Tdap)    Colorectal Cancer Screening Combo     Flu Vaccine (1)    Shingrix Vaccine Age 50> (1 of 2)    COVID-19 Vaccine (3 - Booster for Moderna series)       Coordination of Care Questionnaire:  :   1) Have you been to an emergency room, urgent care, or hospitalized since your last visit? If yes, where when, and reason for visit? no       2. Have seen or consulted any other health care provider since your last visit? If yes, where when, and reason for visit? NO      Patient is accompanied by self I have received verbal consent from Qi Jernigan to discuss any/all medical information while they are present in the room. An electronic signature was used to authenticate this note.   -- Sophie Ordaz LPN

## 2022-03-05 LAB
A VAGINAE DNA VAG QL NAA+PROBE: NORMAL SCORE
BVAB2 DNA VAG QL NAA+PROBE: NORMAL SCORE
C ALBICANS DNA VAG QL NAA+PROBE: NEGATIVE
C GLABRATA DNA VAG QL NAA+PROBE: NEGATIVE
C TRACH RRNA SPEC QL NAA+PROBE: NEGATIVE
MEGA1 DNA VAG QL NAA+PROBE: NORMAL SCORE
N GONORRHOEA RRNA SPEC QL NAA+PROBE: NEGATIVE
SPECIMEN SOURCE: NORMAL
T VAGINALIS RRNA SPEC QL NAA+PROBE: NEGATIVE

## 2022-03-16 ENCOUNTER — OFFICE VISIT (OUTPATIENT)
Dept: NEUROLOGY | Age: 52
End: 2022-03-16
Payer: COMMERCIAL

## 2022-03-16 VITALS
WEIGHT: 186.4 LBS | OXYGEN SATURATION: 98 % | HEIGHT: 65 IN | SYSTOLIC BLOOD PRESSURE: 124 MMHG | HEART RATE: 68 BPM | TEMPERATURE: 97.6 F | BODY MASS INDEX: 31.06 KG/M2 | DIASTOLIC BLOOD PRESSURE: 71 MMHG

## 2022-03-16 DIAGNOSIS — M77.9 TENDONITIS: ICD-10-CM

## 2022-03-16 DIAGNOSIS — G44.031 INTRACTABLE EPISODIC PAROXYSMAL HEMICRANIA: ICD-10-CM

## 2022-03-16 DIAGNOSIS — G43.719 INTRACTABLE CHRONIC MIGRAINE WITHOUT AURA AND WITHOUT STATUS MIGRAINOSUS: Primary | ICD-10-CM

## 2022-03-16 DIAGNOSIS — E55.9 VITAMIN D DEFICIENCY: ICD-10-CM

## 2022-03-16 PROCEDURE — 99214 OFFICE O/P EST MOD 30 MIN: CPT | Performed by: PSYCHIATRY & NEUROLOGY

## 2022-03-16 RX ORDER — FREMANEZUMAB-VFRM 225 MG/1.5ML
225 INJECTION SUBCUTANEOUS
Qty: 225 ML | Refills: 3 | Status: SHIPPED | OUTPATIENT
Start: 2022-03-16 | End: 2022-05-12

## 2022-03-16 RX ORDER — FREMANEZUMAB-VFRM 225 MG/1.5ML
225 INJECTION SUBCUTANEOUS ONCE
Qty: 225 ML | Refills: 0 | Status: SHIPPED | COMMUNITY
Start: 2022-03-16 | End: 2022-03-16

## 2022-03-16 RX ORDER — HYDROCORTISONE 25 MG/G
OINTMENT TOPICAL
COMMUNITY
Start: 2022-03-15

## 2022-03-16 RX ORDER — TIZANIDINE 4 MG/1
4 TABLET ORAL
Qty: 30 TABLET | Refills: 3 | Status: SHIPPED | OUTPATIENT
Start: 2022-03-16

## 2022-03-16 RX ORDER — MUPIROCIN 20 MG/G
OINTMENT TOPICAL
COMMUNITY
Start: 2022-01-10

## 2022-03-16 NOTE — PROGRESS NOTES
Neurology Progress Note    NAME:  Dirk Vincent   :   1970   MRN:   [id number]     Date/Time:  3/16/2022  Subjective:      Dirk Vincent is a 49 y.o. female here today for follow-up for migraine headache, test results. Patient says she is still having a lot of headaches, headache frequency has been about 2 to 3/week, severe, even after taking the other prophylaxis. Patient has been on it for the following, Topamax, Elavil, propanolol, verapamil and none has been very helpful. I will start patient on Ajovy 225 mg subcu q. monthly. MRI of the brain reviewed with patient was significant for minimal scattered foci of hyperintensity most likely secondary to migraine I am yet to the CD. Blood work is involved for low vitamin D 15.1, patient is started on vitamin D2 50,000 units q. weekly. Review of Systems - General ROS: positive for  - fatigue and sleep disturbance  Psychological ROS: positive for - anxiety and sleep disturbances  Ophthalmic ROS: positive for - blurry vision and photophobia  ENT ROS: positive for - headaches and visual changes  Allergy and Immunology ROS: negative  Hematological and Lymphatic ROS: negative  Endocrine ROS: negative  Respiratory ROS: no cough, shortness of breath, or wheezing  Cardiovascular ROS: no chest pain or dyspnea on exertion  Gastrointestinal ROS: no abdominal pain, change in bowel habits, or black or bloody stools  Genito-Urinary ROS: no dysuria, trouble voiding, or hematuria  Musculoskeletal ROS: negative  Neurological ROS: positive for - dizziness, headaches and visual changes  Dermatological ROS: negative      Medications reviewed:  Current Outpatient Medications   Medication Sig Dispense Refill    fremanezumab-vfrm (Ajovy Autoinjector) 225 mg/1.5 mL auto-injector 1.5 mL by SubCUTAneous route every month. 225 mL 3    tiZANidine (ZANAFLEX) 4 mg tablet Take 1 Tablet by mouth nightly.  30 Tablet 3    fremanezumab-vfrm (Ajovy Autoinjector) 225 mg/1.5 mL auto-injector 1.5 mL by SubCUTAneous route once for 1 dose. 225 mL 0    fluconazole (DIFLUCAN) 150 mg tablet Take one tablet every 72 hours. 2 Tablet 0    clotrimazole (GYNE-LOTRIMIN) 2 % vaginal cream Apply to the affected areas every evening. 21 g 1    ergocalciferol (ERGOCALCIFEROL) 1,250 mcg (50,000 unit) capsule Take 1 Capsule by mouth every seven (7) days. 4 Capsule 3    rizatriptan (MAXALT-MLT) 10 mg disintegrating tablet       diphenhydrAMINE (Benadryl Allergy) 25 mg tablet Take 25 mg by mouth as needed. Takes with Benadryl and Toradol when she has to take them      B2-magnesium cit,oxid-feverfew (MigreLief) 200-180-50 mg tab Take 1 Tablet by mouth daily.  butalbital-acetaminophen-caffeine (FIORICET, ESGIC) -40 mg per tablet Take 1 Tablet by mouth as needed for Headache. Takes with Benadryl and Toradol when she has to take them      predniSONE (DELTASONE) 10 mg tablet Take 1 tab p.o tid x3 days, 1 tab p.o bid x4 days, 1 p.o every day x3 days 20 Tablet 0    ketorolac (TORADOL) 10 mg tablet Take 10 mg by mouth as needed. Takes with Benadryl and Toradol when she has to take them      buPROPion XL (WELLBUTRIN XL) 150 mg tablet TAKE 1 TABLET BY MOUTH EVERY MORNING 90 Tablet 1    rizatriptan (MAXALT) 10 mg tablet Take 10 mg by mouth once as needed.  ondansetron (ZOFRAN ODT) 4 mg disintegrating tablet DISSOLVE ONE TABLET BY MOUTH EVERY 8 HOURS AS NEEDED FOR NAUSEA 9 Tab 0    senna (SENNA) 8.6 mg tablet Take 1 Tab by mouth daily.  15 Tab 0    hydrocortisone (HYTONE) 2.5 % ointment       mupirocin (BACTROBAN) 2 % ointment APPLY TO AFFECTED AREA TWICE A DAY UNTIL HEALED          Objective:   Vitals:  Vitals:    03/16/22 0838   BP: 124/71   Pulse: 68   Temp: 97.6 °F (36.4 °C)   TempSrc: Temporal   SpO2: 98%   Weight: 186 lb 6.4 oz (84.6 kg)   Height: 5' 5\" (1.651 m)   PainSc:   0 - No pain               Lab Data Reviewed:  Lab Results   Component Value Date/Time    WBC 4.6 08/02/2021 09:23 AM    HCT 41.6 08/02/2021 09:23 AM    HGB 13.1 08/02/2021 09:23 AM    PLATELET 536 66/54/1634 09:23 AM       Lab Results   Component Value Date/Time    Sodium 139 08/02/2021 09:23 AM    Potassium 4.3 08/02/2021 09:23 AM    Chloride 107 08/02/2021 09:23 AM    CO2 27 08/02/2021 09:23 AM    Glucose 85 08/02/2021 09:23 AM    BUN 11 08/02/2021 09:23 AM    Creatinine 0.76 08/02/2021 09:23 AM    Calcium 8.7 08/02/2021 09:23 AM       No components found for: TROPQUANT    No results found for: ANNA      Lab Results   Component Value Date/Time    Hemoglobin A1c 5.6 05/20/2016 08:07 AM        Lab Results   Component Value Date/Time    Vitamin B12 459 12/08/2021 09:00 AM       No results found for: ANNA, ANARX, ANAIGG, XBANA    Lab Results   Component Value Date/Time    Cholesterol, total 179 08/02/2021 09:23 AM    HDL Cholesterol 99 08/02/2021 09:23 AM    LDL, calculated 67.6 08/02/2021 09:23 AM    VLDL, calculated 12.4 08/02/2021 09:23 AM    Triglyceride 62 08/02/2021 09:23 AM    CHOL/HDL Ratio 1.8 08/02/2021 09:23 AM         CT Results (recent):  No results found for this or any previous visit. MRI Results (recent):  No results found for this or any previous visit. IR Results (recent):  No results found for this or any previous visit. VAS/US Results (recent):  No results found for this or any previous visit. PHYSICAL EXAM:  General:    Alert, cooperative, no distress, appears stated age. Head:   Normocephalic, without obvious abnormality, atraumatic. Eyes:   Conjunctivae/corneas clear. PERRLA  Nose:  Nares normal. No drainage or sinus tenderness. Throat:    Lips, mucosa, and tongue normal.  No Thrush  Neck:  Supple, symmetrical,  no adenopathy, thyroid: non tender    no carotid bruit and no JVD. Back:    Symmetric,  No CVA tenderness. Lungs:   Clear to auscultation bilaterally. No Wheezing or Rhonchi. No rales. Chest wall:  No tenderness or deformity. No Accessory muscle use.   Heart:   Regular rate and rhythm,  no murmur, rub or gallop. Abdomen:   Soft, non-tender. Not distended. Bowel sounds normal. No masses  Extremities: Extremities normal, atraumatic, No cyanosis. No edema. No clubbing  Skin:     Texture, turgor normal. No rashes or lesions. Not Jaundiced  Lymph nodes: Cervical, supraclavicular normal.  Psych:  Good insight. Not depressed. Not anxious or agitated. NEUROLOGICAL EXAM:  Appearance: The patient is well developed, well nourished, provides a coherent history and is in no acute distress. Mental Status: Oriented to time, place and person. Mood and affect appropriate. Cranial Nerves:   Intact visual fields. Fundi are benign. BILLY, EOM's full, no nystagmus, no ptosis. Facial sensation is normal. Corneal reflexes are intact. Facial movement is symmetric. Hearing is normal bilaterally. Palate is midline with normal sternocleidomastoid and trapezius muscles are normal. Tongue is midline. Motor:  5/5 strength in upper and lower proximal and distal muscles. Normal bulk and tone. No fasciculations. Reflexes:   Deep tendon reflexes 2+/4 and symmetrical.   Sensory:   Normal to touch, pinprick and vibration. Gait:  Normal gait. Tremor:   No tremor noted. Cerebellar:  No cerebellar signs present. Neurovascular:  Normal heart sounds and regular rhythm, peripheral pulses intact, and no carotid bruits. Assesment  1. Intractable chronic migraine without aura and without status migrainosus  Ajovy subcu q. monthly    2. Intractable episodic paroxysmal hemicrania  Ajovy    3. Tendonitis  Zanaflex    4. Vitamin D deficiency  Vitamin D2 50,000 units q. weekly    ___________________________________________________  PLAN: Medication and plan discussed with patient      ICD-10-CM ICD-9-CM    1. Intractable chronic migraine without aura and without status migrainosus  G43.719 346.71    2. Intractable episodic paroxysmal hemicrania  G44.031 339.03    3. Tendonitis  M77.9 726.90    4. Vitamin D deficiency  E55.9 268.9      Follow-up and Dispositions    · Return in about 4 months (around 7/16/2022).                ___________________________________________________    Attending Physician: Johnny Escalante MD

## 2022-04-19 ENCOUNTER — PATIENT MESSAGE (OUTPATIENT)
Dept: INTERNAL MEDICINE CLINIC | Age: 52
End: 2022-04-19

## 2022-04-21 ENCOUNTER — TELEPHONE (OUTPATIENT)
Dept: INTERNAL MEDICINE CLINIC | Age: 52
End: 2022-04-21

## 2022-04-21 DIAGNOSIS — R53.82 CHRONIC FATIGUE: Primary | ICD-10-CM

## 2022-04-21 NOTE — TELEPHONE ENCOUNTER
Kelli Vo MD  You; Renato Dailey 1 hour ago (8:14 AM)     RL    Please see the signed order.         [employer] message sent to patient to advise about her lab orders  Stefano Calderon LPN

## 2022-04-21 NOTE — TELEPHONE ENCOUNTER
----- Message from Olimpia Mayes sent at 4/20/2022  3:57 PM EDT -----  Regarding: Thyroid check  Could Dr Chau Briceño perform an initial thyroid test?

## 2022-05-12 ENCOUNTER — OFFICE VISIT (OUTPATIENT)
Dept: URGENT CARE | Age: 52
End: 2022-05-12
Payer: COMMERCIAL

## 2022-05-12 ENCOUNTER — VIRTUAL VISIT (OUTPATIENT)
Dept: INTERNAL MEDICINE CLINIC | Age: 52
End: 2022-05-12

## 2022-05-12 VITALS
TEMPERATURE: 98.4 F | RESPIRATION RATE: 16 BRPM | HEART RATE: 53 BPM | OXYGEN SATURATION: 99 % | SYSTOLIC BLOOD PRESSURE: 130 MMHG | DIASTOLIC BLOOD PRESSURE: 86 MMHG

## 2022-05-12 DIAGNOSIS — Z11.59 SCREENING FOR VIRAL DISEASE: ICD-10-CM

## 2022-05-12 DIAGNOSIS — R52 BODY ACHES: ICD-10-CM

## 2022-05-12 DIAGNOSIS — E55.9 VITAMIN D DEFICIENCY: ICD-10-CM

## 2022-05-12 DIAGNOSIS — R05.9 COUGH: Primary | ICD-10-CM

## 2022-05-12 DIAGNOSIS — R53.82 CHRONIC FATIGUE: Primary | ICD-10-CM

## 2022-05-12 DIAGNOSIS — R82.998 DARK URINE: ICD-10-CM

## 2022-05-12 DIAGNOSIS — R53.83 FATIGUE, UNSPECIFIED TYPE: ICD-10-CM

## 2022-05-12 LAB — SARS-COV-2 PCR, POC: NEGATIVE

## 2022-05-12 PROCEDURE — 99202 OFFICE O/P NEW SF 15 MIN: CPT | Performed by: FAMILY MEDICINE

## 2022-05-12 PROCEDURE — 99442 PR PHYS/QHP TELEPHONE EVALUATION 11-20 MIN: CPT | Performed by: FAMILY MEDICINE

## 2022-05-12 PROCEDURE — 87635 SARS-COV-2 COVID-19 AMP PRB: CPT | Performed by: FAMILY MEDICINE

## 2022-05-12 NOTE — PROGRESS NOTES
Meka Keys is a 49 y.o. female who presents with cough, fatigue x 5 days.  with cold like sx. Denies fever, SOB, N/V/D. PCP advised to get COVID test today. The history is provided by the patient. Past Medical History:   Diagnosis Date    Headache     Headache(784.0)     migraine    Migraine 2018    TMJ (dislocation of temporomandibular joint)         Past Surgical History:   Procedure Laterality Date    HX CYST REMOVAL  2003    HX ORTHOPAEDIC      ganglion cyst removal         Family History   Problem Relation Age of Onset    Stroke Paternal [de-identified]     Alzheimer's Disease Paternal Grandmother     Cancer Paternal Grandfather         lung    Hypertension Mother     Glaucoma Mother     Other Mother         shingles    Cancer Father     Other Father         quadriplegic from motor vehicle accident    Hypertension Brother     Cancer Paternal Uncle         pancreatic        Social History     Socioeconomic History    Marital status:      Spouse name: Not on file    Number of children: Not on file    Years of education: Not on file    Highest education level: Not on file   Occupational History    Occupation: marketing   Tobacco Use    Smoking status: Never Smoker    Smokeless tobacco: Never Used   Vaping Use    Vaping Use: Never used   Substance and Sexual Activity    Alcohol use:  Yes     Alcohol/week: 0.0 standard drinks     Types: 5 - 6 Glasses of wine per week     Comment: few drinks weekly     Drug use: Never    Sexual activity: Yes     Partners: Male     Birth control/protection: Rhythm   Other Topics Concern     Service Not Asked    Blood Transfusions Not Asked    Caffeine Concern Not Asked    Occupational Exposure Not Asked    Hobby Hazards Not Asked    Sleep Concern No    Stress Concern Yes    Weight Concern No    Special Diet Yes    Back Care Not Asked    Exercise Yes    Bike Helmet Not Asked    Seat Belt Yes    Self-Exams Yes Social History Narrative    Not on file     Social Determinants of Health     Financial Resource Strain:     Difficulty of Paying Living Expenses: Not on file   Food Insecurity:     Worried About Running Out of Food in the Last Year: Not on file    Marsha of Food in the Last Year: Not on file   Transportation Needs:     Lack of Transportation (Medical): Not on file    Lack of Transportation (Non-Medical): Not on file   Physical Activity:     Days of Exercise per Week: Not on file    Minutes of Exercise per Session: Not on file   Stress:     Feeling of Stress : Not on file   Social Connections:     Frequency of Communication with Friends and Family: Not on file    Frequency of Social Gatherings with Friends and Family: Not on file    Attends Latter day Services: Not on file    Active Member of [address] [place] [address] [employer] or Organizations: Not on file    Attends Club or Organization Meetings: Not on file    Marital Status: Not on file   Intimate Partner Violence:     Fear of Current or Ex-Partner: Not on file    Emotionally Abused: Not on file    Physically Abused: Not on file    Sexually Abused: Not on file   Housing Stability:     Unable to Pay for Housing in the Last Year: Not on file    Number of [place] in the Last Year: Not on file    Unstable Housing in the Last Year: Not on file                ALLERGIES: Patient has no known allergies. Review of Systems   Constitutional: Positive for fatigue. Negative for activity change, appetite change and fever. HENT: Negative for ear pain. Respiratory: Positive for cough. Negative for shortness of breath. Gastrointestinal: Negative for diarrhea, nausea and vomiting. Vitals:    05/12/22 0943   BP: 130/86   Pulse: (!) 53   Resp: 16   Temp: 98.4 °F (36.9 °C)   SpO2: 99%       Physical Exam  Vitals and nursing note reviewed. Constitutional:       General: She is not in acute distress. Appearance: She is well-developed. She is not diaphoretic. HENT:      Right Ear: Tympanic membrane, ear canal and external ear normal.      Left Ear: Tympanic membrane, ear canal and external ear normal.      Nose: Nose normal.      Right Sinus: No maxillary sinus tenderness or frontal sinus tenderness. Left Sinus: No maxillary sinus tenderness or frontal sinus tenderness. Mouth/Throat:      Pharynx: No oropharyngeal exudate or posterior oropharyngeal erythema. Tonsils: No tonsillar abscesses. Cardiovascular:      Rate and Rhythm: Normal rate and regular rhythm. Heart sounds: Normal heart sounds. Pulmonary:      Effort: Pulmonary effort is normal. No respiratory distress. Breath sounds: Normal breath sounds. No stridor. No wheezing, rhonchi or rales. Lymphadenopathy:      Cervical: No cervical adenopathy. Neurological:      Mental Status: She is alert. Psychiatric:         Behavior: Behavior normal.         Thought Content: Thought content normal.         Judgment: Judgment normal.         MDM    ICD-10-CM ICD-9-CM   1. Cough  R05.9 786.2   2. Fatigue, unspecified type  R53.83 780.79   3. Screening for viral disease  Z11.59 V73.99       Orders Placed This Encounter    POCT COVID-19, SARS-COV-2, PCR     Order Specific Question:   Is this test for diagnosis or screening? Answer:   Diagnosis of ill patient     Order Specific Question:   Symptomatic for COVID-19 as defined by CDC? Answer:   Yes     Order Specific Question:   Date of Symptom Onset     Answer:   5/7/2022     Order Specific Question:   Hospitalized for COVID-19? Answer:   No     Order Specific Question:   Admitted to ICU for COVID-19? Answer:   No     Order Specific Question:   Employed in healthcare setting? Answer:   Unknown     Order Specific Question:   Resident in a congregate (group) care setting? Answer:   No     Order Specific Question:   Pregnant? Answer:   No     Order Specific Question:   Previously tested for COVID-19?      Answer: Unknown      Likely viral uri  Increase fluids  OTC cough suppressant prn  The patient is to follow up with PCP [facility]    If signs and symptoms become worse the pt is to go to the ER.        Results for orders placed or performed in visit on 05/12/22   POCT COVID-19, SARS-COV-2, PCR   Result Value Ref Range    SARS-COV-2 PCR, POC Negative Negative     Procedures

## 2022-05-12 NOTE — PROGRESS NOTES
Peter Thakkar is a 49 y.o. female who was seen by synchronous (real-time) audio technology on 5/12/2022 for Generalized Body Aches and Thyroid Problem (concerns. )        Assessment & Plan:   Diagnoses and all orders for this visit:    1. Chronic fatigue  -     VITAMIN D, 25 HYDROXY; Future  -     T4, FREE; Future  -     TSH 3RD GENERATION; Future  -     CBC WITH AUTOMATED DIFF; Future  -     METABOLIC PANEL, COMPREHENSIVE; Future    2. Vitamin D deficiency  -     VITAMIN D, 25 HYDROXY; Future    3. Body aches  -     SED RATE (ESR); Future  -     METABOLIC PANEL, COMPREHENSIVE; Future    Chronic Fatigue: I ordered a thyroid test to rule out fatigue due to weight gain. I advised her to reduce her supplement intake to see if there is reduction of her symptoms. I ordered a CMP and CBC. Vitamin D Deficiency: I ordered a Vitamin D test.     Body Aches: I ordered a SED rate to rule out inflammatory disease. She will inform me about difficulties regarding COVID test results. I ordered a CMP. Depression: She is on Wellbutrin, but she is still feeling down. I may increase the dosage of Wellbutrin, if all the other testing is negative . I spent at least 15 minutes on this visit with this established patient. Subjective:   She presents today for a c/o of a body aches and thyroid problems. Her throat is dry and scratchy. She is doing well. Body Aches: She experiences body aches, hypersomnolence, and SOB on exertion throughout the day for the past few months. Her symptoms have become more pronounced in the past 4 weeks. She gained 20 lbs despite a healthy lifestyle. She has stopped drinking coffee in March, which decreased the frequency of her migraines. She was on a high dose of vitamin D for a month due to a vitamin D deficiency, and she has now returned to an OTC vitamin. Recently, she has started using Odu's Oil for a month and a half, which is a blend of flax seed and sunflower seed oil.  She started taking a AM/PM menopause supplement 2 months ago. This consists of  AM and PM dosage of a variety of herbal supplements She takes Wellbutrin daily and uses Maxalt as needed. She spaces out the medication, and she will take supplements in the morning and the Wellbutrin around noon. Her current bottle of Wellbutrin was started in [place]. She still experiences a depressed mood. She experienced family stressors. Her  recently had a COVID rapid, which was negative. She notes irregular menstrual cycles. She notes increased cramps and mood swings. Her last period was May 4th, which was noted to be lighter than normal. She notes  darker colored urine and urgency. She denies changes to her bowel movements. She reports that fatigue has occured for the last year. She takes a nap between 7:30-8:30pm prior to her bedtime of 9pm. She falls between 10-11pm. She denies snoring. She wakes up between 4:30am and 5:30am. When she is unable to fall back asleep, she will read or exercise. She notes no recent sexual activity. PREVENTIVE:  COVID Booster #3: UTD    Prior to Admission medications    Medication Sig Start Date End Date Taking? Authorizing Provider   hydrocortisone (HYTONE) 2.5 % ointment  3/15/22  Yes Provider, Historical   mupirocin (BACTROBAN) 2 % ointment APPLY TO AFFECTED AREA TWICE A DAY UNTIL HEALED 1/10/22  Yes Provider, Historical   ergocalciferol (ERGOCALCIFEROL) 1,250 mcg (50,000 unit) capsule Take 1 Capsule by mouth every seven (7) days. 1/4/22  Yes Surjit Ma MD   diphenhydrAMINE (Benadryl Allergy) 25 mg tablet Take 25 mg by mouth as needed. Takes with Benadryl and Toradol when she has to take them   Yes Provider, Historical   butalbital-acetaminophen-caffeine (FIORICET, ESGIC) -40 mg per tablet Take 1 Tablet by mouth as needed for Headache.  Takes with Benadryl and Toradol when she has to take them   Yes Provider, Historical   ketorolac (TORADOL) 10 mg tablet Take 10 mg by mouth as needed. Takes with Benadryl and Toradol when she has to take them 9/2/21  Yes Provider, Historical   buPROPion XL (WELLBUTRIN XL) 150 mg tablet TAKE 1 TABLET BY MOUTH EVERY MORNING 9/27/21  Yes Nataly Kaiser MD   rizatriptan (MAXALT) 10 mg tablet Take 10 mg by mouth once as needed. Yes Provider, Historical   ondansetron (ZOFRAN ODT) 4 mg disintegrating tablet DISSOLVE ONE TABLET BY MOUTH EVERY 8 HOURS AS NEEDED FOR NAUSEA 12/23/20  Yes Nataly Kaiser MD   fremanezumab-vfrm (Ajovy Autoinjector) 225 mg/1.5 mL auto-injector 1.5 mL by SubCUTAneous route every month. Patient not taking: Reported on 5/12/2022 3/16/22   Surjit Ma MD   tiZANidine (ZANAFLEX) 4 mg tablet Take 1 Tablet by mouth nightly. 3/16/22   Surjit Ma MD   fluconazole (DIFLUCAN) 150 mg tablet Take one tablet every 72 hours. Patient not taking: Reported on 5/12/2022 2/28/22   Nataly Kaiser MD   clotrimazole (GYNE-LOTRIMIN) 2 % vaginal cream Apply to the affected areas every evening. Patient not taking: Reported on 5/12/2022 2/24/22   Nataly Kaiser MD   rizatriptan (MAXALT-MLT) 10 mg disintegrating tablet  10/31/21   Provider, Historical   B2-magnesium cit,oxid-feverfew (MigreLief) 200-180-50 mg tab Take 1 Tablet by mouth daily. Patient not taking: Reported on 5/12/2022    Provider, Historical   predniSONE (DELTASONE) 10 mg tablet Take 1 tab p.o tid x3 days, 1 tab p.o bid x4 days, 1 p.o every day x3 days  Patient not taking: Reported on 5/12/2022 12/8/21   Surjit Ma MD   senna (SENNA) 8.6 mg tablet Take 1 Tab by mouth daily.   Patient not taking: Reported on 5/12/2022 10/18/18   Demaris Angelucci, MD     Patient Active Problem List    Diagnosis Date Noted    Insomnia 11/13/2015    Migraine 11/13/2015     Current Outpatient Medications   Medication Sig Dispense Refill    hydrocortisone (HYTONE) 2.5 % ointment       mupirocin (BACTROBAN) 2 % ointment APPLY TO AFFECTED AREA TWICE A DAY UNTIL HEALED      ergocalciferol (ERGOCALCIFEROL) 1,250 mcg (50,000 unit) capsule Take 1 Capsule by mouth every seven (7) days. 4 Capsule 3    diphenhydrAMINE (Benadryl Allergy) 25 mg tablet Take 25 mg by mouth as needed. Takes with Benadryl and Toradol when she has to take them      butalbital-acetaminophen-caffeine (FIORICET, ESGIC) -40 mg per tablet Take 1 Tablet by mouth as needed for Headache. Takes with Benadryl and Toradol when she has to take them      ketorolac (TORADOL) 10 mg tablet Take 10 mg by mouth as needed. Takes with Benadryl and Toradol when she has to take them      buPROPion XL (WELLBUTRIN XL) 150 mg tablet TAKE 1 TABLET BY MOUTH EVERY MORNING 90 Tablet 1    rizatriptan (MAXALT) 10 mg tablet Take 10 mg by mouth once as needed.  ondansetron (ZOFRAN ODT) 4 mg disintegrating tablet DISSOLVE ONE TABLET BY MOUTH EVERY 8 HOURS AS NEEDED FOR NAUSEA 9 Tab 0    fremanezumab-vfrm (Ajovy Autoinjector) 225 mg/1.5 mL auto-injector 1.5 mL by SubCUTAneous route every month. (Patient not taking: Reported on 5/12/2022) 225 mL 3    tiZANidine (ZANAFLEX) 4 mg tablet Take 1 Tablet by mouth nightly. 30 Tablet 3    fluconazole (DIFLUCAN) 150 mg tablet Take one tablet every 72 hours. (Patient not taking: Reported on 5/12/2022) 2 Tablet 0    clotrimazole (GYNE-LOTRIMIN) 2 % vaginal cream Apply to the affected areas every evening. (Patient not taking: Reported on 5/12/2022) 21 g 1    rizatriptan (MAXALT-MLT) 10 mg disintegrating tablet  (Patient not taking: Reported on 5/12/2022)      B2-magnesium cit,oxid-feverfew (MigreLief) 200-180-50 mg tab Take 1 Tablet by mouth daily. (Patient not taking: Reported on 5/12/2022)      predniSONE (DELTASONE) 10 mg tablet Take 1 tab p.o tid x3 days, 1 tab p.o bid x4 days, 1 p.o every day x3 days (Patient not taking: Reported on 5/12/2022) 20 Tablet 0    senna (SENNA) 8.6 mg tablet Take 1 Tab by mouth daily.  (Patient not taking: Reported on 5/12/2022) 15 Tab 0     No Known Allergies  Past Medical History:   Diagnosis Date    Headache     Headache(784.0)     migraine    Migraine 2018    TMJ (dislocation of temporomandibular joint)      Past Surgical History:   Procedure Laterality Date    HX CYST REMOVAL  2003    HX ORTHOPAEDIC      ganglion cyst removal     Family History   Problem Relation Age of Onset    Stroke Paternal [de-identified]     Alzheimer's Disease Paternal Grandmother     Cancer Paternal Grandfather         lung    Hypertension Mother     Glaucoma Mother     Other Mother         shingles    Cancer Father     Other Father         quadriplegic from motor vehicle accident    Hypertension Brother     Cancer Paternal Uncle         pancreatic     Social History     Tobacco Use    Smoking status: Never Smoker    Smokeless tobacco: Never Used   Substance Use Topics    Alcohol use: Yes     Alcohol/week: 0.0 standard drinks     Types: 5 - 6 Glasses of wine per week     Comment: few drinks weekly        Review of Systems   Constitutional: Positive for malaise/fatigue. HENT: Negative. Eyes: Negative. Respiratory: Positive for shortness of breath. Cardiovascular: Negative. Gastrointestinal: Negative. Genitourinary: Negative. Musculoskeletal: Negative. Skin: Negative. Neurological: Negative. Endo/Heme/Allergies: Negative. Psychiatric/Behavioral: Negative.         Objective:     Patient-Reported Vitals 5/12/2022   Patient-Reported Weight 183   Patient-Reported Height 5'5.5\"   Patient-Reported LMP May 4        [INSTRUCTIONS:  \"[x]\" Indicates a positive item  \"[]\" Indicates a negative item  -- DELETE ALL ITEMS NOT EXAMINED]    Constitutional: [x] Appears well-developed and well-nourished [x] No apparent distress      [] Abnormal -     Mental status: [x] Alert and awake  [x] Oriented to person/place/time [x] Able to follow commands    [] Abnormal -            Psychiatric:       [x] Normal Affect [] Abnormal -        [x] No Hallucinations    Other pertinent observable physical exam findings:-        We discussed the expected course, resolution and complications of the diagnosis(es) in detail. Medication risks, benefits, costs, interactions, and alternatives were discussed as indicated. I advised her to contact the office if her condition worsens, changes or fails to improve as anticipated. She expressed understanding with the diagnosis(es) and plan. lAka Bray, was evaluated through a synchronous (real-time) audio encounter. The patient (or guardian if applicable) is aware that this is a billable service, which includes applicable co-pays. Verbal consent to proceed has been obtained. The visit was conducted pursuant to the emergency declaration under the [facility], [address] [place] [address] authority and the [employer] and [employer] General Act. Patient identification was verified, and a caregiver was present when appropriate. The patient was located at home in a state where the provider was licensed to provide care.     Written by Erin Card, as dictated by Mark Powell MD.    Zeny Holguin

## 2022-05-12 NOTE — PROGRESS NOTES
1. \"Have you been to the ER, urgent care clinic since your last visit? Hospitalized since your last visit? \" No    2. \"Have you seen or consulted any other health care providers outside of the [address] [place] [address] since your last visit? \" No     3. For patients aged 39-70: Has the patient had a colonoscopy / FIT/ Cologuard? No      If the patient is female:    4. For patients aged 41-77: Has the patient had a mammogram within the past 2 years? Yes - no Care Gap present      5. For patients aged 21-65: Has the patient had a pap smear?  Yes - no Care Gap present

## 2022-05-12 NOTE — PROGRESS NOTES
Zeyad Jean Baptiste is a 49 y.o. female who was seen by synchronous (real-time) audio technology on 5/12/2022 for Generalized Body Aches and Thyroid Problem (concerns. )        Assessment & Plan:   Diagnoses and all orders for this visit:    1. Chronic fatigue  -     VITAMIN D, 25 HYDROXY; Future  -     T4, FREE; Future  -     TSH 3RD GENERATION; Future  -     CBC WITH AUTOMATED DIFF; Future  -     METABOLIC PANEL, COMPREHENSIVE; Future    2. Vitamin D deficiency  -     VITAMIN D, 25 HYDROXY; Future    3. Body aches  -     SED RATE (ESR); Future  -     METABOLIC PANEL, COMPREHENSIVE; Future    Chronic Fatigue: I ordered a thyroid test to rule out fatigue due to weight gain. I advised her to reduce her supplement intake to see if there is reduction of her symptoms. I ordered a CMP and CBC. Vitamin D Deficiency: I ordered a Vitamin D test.     Body Aches: I ordered a SED rate to rule out inflammatory disease. She will inform me about difficulties regarding COVID test results. I ordered a CMP. Depression: She is on Wellbutrin, but she is still feeling down. I may increase the dosage of Wellbutrin, if all the other testing is negative . I spent at least 15 minutes on this visit with this established patient. Subjective:   She presents today for a c/o of a body aches and thyroid problems. Her throat is dry and scratchy. She is doing well. Body Aches: She experiences body aches, hypersomnolence, and SOB on exertion throughout the day for the past few months. Her symptoms have become more pronounced in the past 4 weeks. She gained 20 lbs despite a healthy lifestyle. She has stopped drinking coffee in March, which decreased the frequency of her migraines. She was on a high dose of vitamin D for a month due to a vitamin D deficiency, and she has now returned to an OTC vitamin. Recently, she has started using Odu's Oil for a month and a half, which is a blend of flax seed and sunflower seed oil.  She started taking a AM/PM menopause supplement 2 months ago. This consists of  AM and PM dosage of a variety of herbal supplements She takes Wellbutrin daily and uses Maxalt as needed. She spaces out the medication, and she will take supplements in the morning and the Wellbutrin around noon. Her current bottle of Wellbutrin was started in [place]. She still experiences a depressed mood. She experienced family stressors. Her  recently had a COVID rapid, which was negative. She notes irregular menstrual cycles. She notes increased cramps and mood swings. Her last period was May 4th, which was noted to be lighter than normal. She notes  darker colored urine and urgency. She denies changes to her bowel movements. She reports that fatigue has occured for the last year. She takes a nap between 7:30-8:30pm prior to her bedtime of 9pm. She falls between 10-11pm. She denies snoring. She wakes up between 4:30am and 5:30am. When she is unable to fall back asleep, she will read or exercise. She notes no recent sexual activity. PREVENTIVE:  COVID Booster #3: UTD    Prior to Admission medications    Medication Sig Start Date End Date Taking? Authorizing Provider   hydrocortisone (HYTONE) 2.5 % ointment  3/15/22  Yes Provider, Historical   mupirocin (BACTROBAN) 2 % ointment APPLY TO AFFECTED AREA TWICE A DAY UNTIL HEALED 1/10/22  Yes Provider, Historical   ergocalciferol (ERGOCALCIFEROL) 1,250 mcg (50,000 unit) capsule Take 1 Capsule by mouth every seven (7) days. 1/4/22  Yes Surjit Ma MD   diphenhydrAMINE (Benadryl Allergy) 25 mg tablet Take 25 mg by mouth as needed. Takes with Benadryl and Toradol when she has to take them   Yes Provider, Historical   butalbital-acetaminophen-caffeine (FIORICET, ESGIC) -40 mg per tablet Take 1 Tablet by mouth as needed for Headache.  Takes with Benadryl and Toradol when she has to take them   Yes Provider, Historical   ketorolac (TORADOL) 10 mg tablet Take 10 mg by mouth as needed. Takes with Benadryl and Toradol when she has to take them 9/2/21  Yes Provider, Historical   buPROPion XL (WELLBUTRIN XL) 150 mg tablet TAKE 1 TABLET BY MOUTH EVERY MORNING 9/27/21  Yes Asia Huitron MD   rizatriptan (MAXALT) 10 mg tablet Take 10 mg by mouth once as needed. Yes Provider, Historical   ondansetron (ZOFRAN ODT) 4 mg disintegrating tablet DISSOLVE ONE TABLET BY MOUTH EVERY 8 HOURS AS NEEDED FOR NAUSEA 12/23/20  Yes Asia Huitron MD   fremanezumab-vfrm (Ajovy Autoinjector) 225 mg/1.5 mL auto-injector 1.5 mL by SubCUTAneous route every month. Patient not taking: Reported on 5/12/2022 3/16/22   Surjit Ma MD   tiZANidine (ZANAFLEX) 4 mg tablet Take 1 Tablet by mouth nightly. 3/16/22   Surjit Ma MD   fluconazole (DIFLUCAN) 150 mg tablet Take one tablet every 72 hours. Patient not taking: Reported on 5/12/2022 2/28/22   Asia Huitron MD   clotrimazole (GYNE-LOTRIMIN) 2 % vaginal cream Apply to the affected areas every evening. Patient not taking: Reported on 5/12/2022 2/24/22   MD shanda Raezatriptan (MAXALT-MLT) 10 mg disintegrating tablet  10/31/21   Provider, Historical   B2-magnesium cit,oxid-feverfew (MigreLief) 200-180-50 mg tab Take 1 Tablet by mouth daily. Patient not taking: Reported on 5/12/2022    Provider, Historical   predniSONE (DELTASONE) 10 mg tablet Take 1 tab p.o tid x3 days, 1 tab p.o bid x4 days, 1 p.o every day x3 days  Patient not taking: Reported on 5/12/2022 12/8/21   Surjit Ma MD   senna (SENNA) 8.6 mg tablet Take 1 Tab by mouth daily.   Patient not taking: Reported on 5/12/2022 10/18/18   Mack Whalen MD     Patient Active Problem List    Diagnosis Date Noted    Insomnia 11/13/2015    Migraine 11/13/2015     Current Outpatient Medications   Medication Sig Dispense Refill    hydrocortisone (HYTONE) 2.5 % ointment       mupirocin (BACTROBAN) 2 % ointment APPLY TO AFFECTED AREA TWICE A DAY UNTIL HEALED      ergocalciferol (ERGOCALCIFEROL) 1,250 mcg (50,000 unit) capsule Take 1 Capsule by mouth every seven (7) days. 4 Capsule 3    diphenhydrAMINE (Benadryl Allergy) 25 mg tablet Take 25 mg by mouth as needed. Takes with Benadryl and Toradol when she has to take them      butalbital-acetaminophen-caffeine (FIORICET, ESGIC) -40 mg per tablet Take 1 Tablet by mouth as needed for Headache. Takes with Benadryl and Toradol when she has to take them      ketorolac (TORADOL) 10 mg tablet Take 10 mg by mouth as needed. Takes with Benadryl and Toradol when she has to take them      buPROPion XL (WELLBUTRIN XL) 150 mg tablet TAKE 1 TABLET BY MOUTH EVERY MORNING 90 Tablet 1    rizatriptan (MAXALT) 10 mg tablet Take 10 mg by mouth once as needed.  ondansetron (ZOFRAN ODT) 4 mg disintegrating tablet DISSOLVE ONE TABLET BY MOUTH EVERY 8 HOURS AS NEEDED FOR NAUSEA 9 Tab 0    fremanezumab-vfrm (Ajovy Autoinjector) 225 mg/1.5 mL auto-injector 1.5 mL by SubCUTAneous route every month. (Patient not taking: Reported on 5/12/2022) 225 mL 3    tiZANidine (ZANAFLEX) 4 mg tablet Take 1 Tablet by mouth nightly. 30 Tablet 3    fluconazole (DIFLUCAN) 150 mg tablet Take one tablet every 72 hours. (Patient not taking: Reported on 5/12/2022) 2 Tablet 0    clotrimazole (GYNE-LOTRIMIN) 2 % vaginal cream Apply to the affected areas every evening. (Patient not taking: Reported on 5/12/2022) 21 g 1    rizatriptan (MAXALT-MLT) 10 mg disintegrating tablet  (Patient not taking: Reported on 5/12/2022)      B2-magnesium cit,oxid-feverfew (MigreLief) 200-180-50 mg tab Take 1 Tablet by mouth daily. (Patient not taking: Reported on 5/12/2022)      predniSONE (DELTASONE) 10 mg tablet Take 1 tab p.o tid x3 days, 1 tab p.o bid x4 days, 1 p.o every day x3 days (Patient not taking: Reported on 5/12/2022) 20 Tablet 0    senna (SENNA) 8.6 mg tablet Take 1 Tab by mouth daily.  (Patient not taking: Reported on 5/12/2022) 15 Tab 0     No Known Allergies  Past Medical History:   Diagnosis Date    Headache     Headache(784.0)     migraine    Migraine 2018    TMJ (dislocation of temporomandibular joint)      Past Surgical History:   Procedure Laterality Date    HX CYST REMOVAL  2003    HX ORTHOPAEDIC      ganglion cyst removal     Family History   Problem Relation Age of Onset    Stroke Paternal [de-identified]     Alzheimer's Disease Paternal Grandmother     Cancer Paternal Grandfather         lung    Hypertension Mother     Glaucoma Mother     Other Mother         shingles    Cancer Father     Other Father         quadriplegic from motor vehicle accident    Hypertension Brother     Cancer Paternal Uncle         pancreatic     Social History     Tobacco Use    Smoking status: Never Smoker    Smokeless tobacco: Never Used   Substance Use Topics    Alcohol use: Yes     Alcohol/week: 0.0 standard drinks     Types: 5 - 6 Glasses of wine per week     Comment: few drinks weekly        Review of Systems   Constitutional: Positive for malaise/fatigue. HENT: Negative. Eyes: Negative. Respiratory: Positive for shortness of breath. Cardiovascular: Negative. Gastrointestinal: Negative. Genitourinary: Negative. Musculoskeletal: Negative. Skin: Negative. Neurological: Negative. Endo/Heme/Allergies: Negative. Psychiatric/Behavioral: Negative.         Objective:     Patient-Reported Vitals 5/12/2022   Patient-Reported Weight 183   Patient-Reported Height 5'5.5\"   Patient-Reported LMP May 4        [INSTRUCTIONS:  \"[x]\" Indicates a positive item  \"[]\" Indicates a negative item  -- DELETE ALL ITEMS NOT EXAMINED]    Constitutional: [x] Appears well-developed and well-nourished [x] No apparent distress      [] Abnormal -     Mental status: [x] Alert and awake  [x] Oriented to person/place/time [x] Able to follow commands    [] Abnormal -            Psychiatric:       [x] Normal Affect [] Abnormal -        [x] No Hallucinations    Other pertinent observable physical exam findings:-        We discussed the expected course, resolution and complications of the diagnosis(es) in detail. Medication risks, benefits, costs, interactions, and alternatives were discussed as indicated. I advised her to contact the office if her condition worsens, changes or fails to improve as anticipated. She expressed understanding with the diagnosis(es) and plan. Radha Arredondo, was evaluated through a synchronous (real-time) audio encounter. The patient (or guardian if applicable) is aware that this is a billable service, which includes applicable co-pays. Verbal consent to proceed has been obtained. The visit was conducted pursuant to the emergency declaration under the [facility], [address] [place] [address] authority and the [employer] General Act. Patient identification was verified, and a caregiver was present when appropriate. The patient was located at home in a state where the provider was licensed to provide care.     Written by Radha Thorne, as dictated by Analisa Flores MD.    Ysabel Navarrete

## 2022-05-14 LAB
25(OH)D3+25(OH)D2 SERPL-MCNC: 29.7 NG/ML (ref 30–100)
ALBUMIN SERPL-MCNC: 4.4 G/DL (ref 3.8–4.8)
ALBUMIN/GLOB SERPL: 1.7 {RATIO} (ref 1.2–2.2)
ALP SERPL-CCNC: 74 IU/L (ref 44–121)
ALT SERPL-CCNC: 12 IU/L (ref 0–32)
APPEARANCE UR: CLEAR
AST SERPL-CCNC: 19 IU/L (ref 0–40)
BACTERIA #/AREA URNS HPF: NORMAL /[HPF]
BACTERIA UR CULT: NORMAL
BASOPHILS # BLD AUTO: 0.1 X10E3/UL (ref 0–0.2)
BASOPHILS NFR BLD AUTO: 1 %
BILIRUB SERPL-MCNC: 0.3 MG/DL (ref 0–1.2)
BILIRUB UR QL STRIP: NEGATIVE
BUN SERPL-MCNC: 11 MG/DL (ref 6–24)
BUN/CREAT SERPL: 13 (ref 9–23)
CALCIUM SERPL-MCNC: 9.5 MG/DL (ref 8.7–10.2)
CASTS URNS QL MICRO: NORMAL /LPF
CHLORIDE SERPL-SCNC: 100 MMOL/L (ref 96–106)
CO2 SERPL-SCNC: 23 MMOL/L (ref 20–29)
COLOR UR: YELLOW
CREAT SERPL-MCNC: 0.88 MG/DL (ref 0.57–1)
EGFR: 80 ML/MIN/1.73
EOSINOPHIL # BLD AUTO: 0.1 X10E3/UL (ref 0–0.4)
EOSINOPHIL NFR BLD AUTO: 2 %
EPI CELLS #/AREA URNS HPF: NORMAL /HPF (ref 0–10)
ERYTHROCYTE [DISTWIDTH] IN BLOOD BY AUTOMATED COUNT: 12.4 % (ref 11.7–15.4)
ERYTHROCYTE [SEDIMENTATION RATE] IN BLOOD BY WESTERGREN METHOD: 13 MM/HR (ref 0–40)
GLOBULIN SER CALC-MCNC: 2.6 G/DL (ref 1.5–4.5)
GLUCOSE SERPL-MCNC: 86 MG/DL (ref 65–99)
GLUCOSE UR QL STRIP: NEGATIVE
HCT VFR BLD AUTO: 43.6 % (ref 34–46.6)
HGB BLD-MCNC: 14.5 G/DL (ref 11.1–15.9)
HGB UR QL STRIP: NEGATIVE
IMM GRANULOCYTES # BLD AUTO: 0 X10E3/UL (ref 0–0.1)
IMM GRANULOCYTES NFR BLD AUTO: 0 %
KETONES UR QL STRIP: NEGATIVE
LEUKOCYTE ESTERASE UR QL STRIP: ABNORMAL
LYMPHOCYTES # BLD AUTO: 1.7 X10E3/UL (ref 0.7–3.1)
LYMPHOCYTES NFR BLD AUTO: 31 %
MCH RBC QN AUTO: 28.3 PG (ref 26.6–33)
MCHC RBC AUTO-ENTMCNC: 33.3 G/DL (ref 31.5–35.7)
MCV RBC AUTO: 85 FL (ref 79–97)
MICRO URNS: ABNORMAL
MONOCYTES # BLD AUTO: 0.5 X10E3/UL (ref 0.1–0.9)
MONOCYTES NFR BLD AUTO: 9 %
NEUTROPHILS # BLD AUTO: 3.1 X10E3/UL (ref 1.4–7)
NEUTROPHILS NFR BLD AUTO: 57 %
NITRITE UR QL STRIP: NEGATIVE
PH UR STRIP: 6.5 [PH] (ref 5–7.5)
PLATELET # BLD AUTO: 300 X10E3/UL (ref 150–450)
POTASSIUM SERPL-SCNC: 4.5 MMOL/L (ref 3.5–5.2)
PROT SERPL-MCNC: 7 G/DL (ref 6–8.5)
PROT UR QL STRIP: NEGATIVE
RBC # BLD AUTO: 5.13 X10E6/UL (ref 3.77–5.28)
RBC #/AREA URNS HPF: NORMAL /HPF (ref 0–2)
SODIUM SERPL-SCNC: 139 MMOL/L (ref 134–144)
SP GR UR STRIP: 1.01 (ref 1–1.03)
T4 FREE SERPL-MCNC: 1.17 NG/DL (ref 0.82–1.77)
TSH SERPL DL<=0.005 MIU/L-ACNC: 2.17 UIU/ML (ref 0.45–4.5)
UROBILINOGEN UR STRIP-MCNC: 0.2 MG/DL (ref 0.2–1)
WBC # BLD AUTO: 5.4 X10E3/UL (ref 3.4–10.8)
WBC #/AREA URNS HPF: NORMAL /HPF (ref 0–5)

## 2022-06-23 DIAGNOSIS — F32.9 REACTIVE DEPRESSION: ICD-10-CM

## 2022-06-26 RX ORDER — BUPROPION HYDROCHLORIDE 300 MG/1
300 TABLET ORAL
Qty: 90 TABLET | Refills: 1 | Status: SHIPPED | OUTPATIENT
Start: 2022-06-26

## 2022-06-30 NOTE — TELEPHONE ENCOUNTER
PCP: Cindy Lewis MD    Last appt: 7/31/2020  Future Appointments   Date Time Provider Jen Augustin   7/18/2022  8:40 AM Alaina Ma MD [facility] BS AMB   8/3/2022  8:40 AM Cindy Lewis MD [facility] BS AMB   10/14/2022 10:50 AM Samy Morales MD [facility] BS AMB       Requested Prescriptions     Pending Prescriptions Disp Refills    ondansetron (ZOFRAN ODT) 4 mg disintegrating tablet 9 Tablet 0     Sig: Take 1 Tablet by mouth every eight (8) hours as needed for Nausea or Vomiting.

## 2022-07-02 RX ORDER — ONDANSETRON 4 MG/1
4 TABLET, ORALLY DISINTEGRATING ORAL
Qty: 9 TABLET | Refills: 0 | Status: SHIPPED | OUTPATIENT
Start: 2022-07-02

## 2022-07-18 ENCOUNTER — OFFICE VISIT (OUTPATIENT)
Dept: NEUROLOGY | Age: 52
End: 2022-07-18
Payer: COMMERCIAL

## 2022-07-18 ENCOUNTER — TELEPHONE (OUTPATIENT)
Dept: NEUROLOGY | Age: 52
End: 2022-07-18

## 2022-07-18 VITALS
SYSTOLIC BLOOD PRESSURE: 110 MMHG | TEMPERATURE: 97.4 F | RESPIRATION RATE: 15 BRPM | HEART RATE: 59 BPM | BODY MASS INDEX: 30.72 KG/M2 | OXYGEN SATURATION: 98 % | HEIGHT: 65 IN | WEIGHT: 184.4 LBS | DIASTOLIC BLOOD PRESSURE: 78 MMHG

## 2022-07-18 DIAGNOSIS — E55.9 VITAMIN D DEFICIENCY: ICD-10-CM

## 2022-07-18 DIAGNOSIS — G44.031 INTRACTABLE EPISODIC PAROXYSMAL HEMICRANIA: ICD-10-CM

## 2022-07-18 DIAGNOSIS — G44.009 MIGRAINE-CLUSTER HEADACHE SYNDROME: ICD-10-CM

## 2022-07-18 DIAGNOSIS — G43.719 INTRACTABLE CHRONIC MIGRAINE WITHOUT AURA AND WITHOUT STATUS MIGRAINOSUS: Primary | ICD-10-CM

## 2022-07-18 PROCEDURE — 99214 OFFICE O/P EST MOD 30 MIN: CPT | Performed by: PSYCHIATRY & NEUROLOGY

## 2022-07-18 RX ORDER — FREMANEZUMAB-VFRM 225 MG/1.5ML
225 INJECTION SUBCUTANEOUS
Qty: 1.5 ML | Refills: 4 | Status: SHIPPED | OUTPATIENT
Start: 2022-07-18

## 2022-07-18 RX ORDER — BUTALBITAL, ACETAMINOPHEN AND CAFFEINE 50; 325; 40 MG/1; MG/1; MG/1
1 TABLET ORAL AS NEEDED
Qty: 20 TABLET | Refills: 2 | Status: SHIPPED | OUTPATIENT
Start: 2022-07-18 | End: 2022-08-12 | Stop reason: SDUPTHER

## 2022-07-18 RX ORDER — KETOROLAC TROMETHAMINE 10 MG/1
10 TABLET, FILM COATED ORAL AS NEEDED
Qty: 10 TABLET | Refills: 0 | Status: SHIPPED | OUTPATIENT
Start: 2022-07-18 | End: 2022-08-12 | Stop reason: SDUPTHER

## 2022-07-18 RX ORDER — FREMANEZUMAB-VFRM 225 MG/1.5ML
225 INJECTION SUBCUTANEOUS ONCE
Qty: 2 ML | Refills: 0 | Status: SHIPPED | COMMUNITY
Start: 2022-07-18 | End: 2022-07-18

## 2022-07-18 RX ORDER — NEOMYCIN/BACITRACIN/POLYMYXINB 3.5-400-5K
1 OINTMENT (GRAM) TOPICAL AS NEEDED
COMMUNITY

## 2022-07-18 NOTE — PROGRESS NOTES
1. Have you been to the ER, urgent care clinic since your last visit? Hospitalized since your last visit? No.    2. Have you seen or consulted any other health care providers outside of the [address] [place] [address] since your last visit? Include any pap smears or colon screening.    No.        Chief Complaint   Patient presents with    Migraine     4 month- pt denies any symptoms

## 2022-07-18 NOTE — TELEPHONE ENCOUNTER
Returned call to pharmacy and she advised for Toradol to leave off the takes with Benadryl and Toradol. Also needs it to have a frequency as to how many hours example take 1 tab every 6,8, 12 or how many hours pt can take medication as needed. Also needing frequency for Fioricet 1 tab every 6, 8, 12 hours as needed for headaches. She has deleted scripts sent so we can send new Rx to pharmacy. Advised I will have  send in new order to pharmacy.

## 2022-07-18 NOTE — PROGRESS NOTES
Neurology Progress Note    NAME:  Mirtha Valles   :   1970   MRN:   [id number]     Date/Time:  2022  Subjective:    Mirtha Valles is a 49 y.o. female here today for follow-up for migraine headache. Patient says that she continues to have headaches however, frequency has been variable. As was previously stated, patient has been tried and failed the following for prophylaxis  Topamax  Elavil  Propanolol  Verapamil  Patient was not able to tolerate triptans as well patient has been using Fioricet and Toradol which can cause rebound headaches  Blood work is involved for low vitamin D 15.1, patient is started on vitamin D2 50,000 units q. weekly. Review of Systems - General ROS: positive for  - fatigue and sleep disturbance  Psychological ROS: positive for - anxiety and sleep disturbances  Ophthalmic ROS: positive for - blurry vision and photophobia  ENT ROS: positive for - headaches and visual changes  Allergy and Immunology ROS: negative  Hematological and Lymphatic ROS: negative  Endocrine ROS: negative  Respiratory ROS: no cough, shortness of breath, or wheezing  Cardiovascular ROS: no chest pain or dyspnea on exertion  Gastrointestinal ROS: no abdominal pain, change in bowel habits, or black or bloody stools  Genito-Urinary ROS: no dysuria, trouble voiding, or hematuria  Musculoskeletal ROS: negative  Neurological ROS: positive for - dizziness, headaches and visual changes  Dermatological ROS: negative          Medications reviewed:  Current Outpatient Medications   Medication Sig Dispense Refill    pyridoxine-melatonin 5-1 mg tab Take 1 mg by mouth as needed. fremanezumab-vfrm (Ajovy Autoinjector) 225 mg/1.5 mL auto-injector 1.5 mL by SubCUTAneous route every month. 1.5 mL 4    ondansetron (ZOFRAN ODT) 4 mg disintegrating tablet Take 1 Tablet by mouth every eight (8) hours as needed for Nausea or Vomiting.  9 Tablet 0    buPROPion XL (WELLBUTRIN XL) 300 mg XL tablet Take 1 Tablet by mouth every morning. 90 Tablet 1    ergocalciferol (ERGOCALCIFEROL) 1,250 mcg (50,000 unit) capsule Take 1 Capsule by mouth every seven (7) days. 4 Capsule 3    hydrocortisone (HYTONE) 2.5 % ointment       mupirocin (BACTROBAN) 2 % ointment APPLY TO AFFECTED AREA TWICE A DAY UNTIL HEALED      tiZANidine (ZANAFLEX) 4 mg tablet Take 1 Tablet by mouth nightly. (Patient taking differently: Take 4 mg by mouth as needed.) 30 Tablet 3    diphenhydrAMINE (BENADRYL) 25 mg tablet Take 25 mg by mouth as needed. Takes with Benadryl and Toradol when she has to take them      rizatriptan (MAXALT) 10 mg tablet Take 10 mg by mouth once as needed. butalbital-acetaminophen-caffeine (FIORICET, ESGIC) -40 mg per tablet Take 1 Tablet by mouth every six (6) hours as needed for Headache. Takes with Benadryl and Toradol when she has to take them 20 Tablet 2    ketorolac (TORADOL) 10 mg tablet Take 1 Tablet by mouth every eight (8) hours as needed for Pain. Takes with Benadryl and Toradol when she has to take them 10 Tablet 0    levonorgestreL (Mirena) 20 mcg/24 hours (7 yrs) 52 mg IUD Mirena 20 mcg/24 hours (7 yrs) 52 mg intrauterine device   Take 1 device by intrauterine route.           Objective:   Vitals:  Vitals:    07/18/22 0847   BP: 110/78   Pulse: (!) 59   Resp: 15   Temp: 97.4 °F (36.3 °C)   TempSrc: Temporal   SpO2: 98%   Weight: 184 lb 6.4 oz (83.6 kg)   Height: 5' 5\" (1.651 m)   PainSc:   0 - No pain               Lab Data Reviewed:  Lab Results   Component Value Date/Time    WBC 5.4 05/12/2022 02:39 PM    HCT 43.6 05/12/2022 02:39 PM    HGB 14.5 05/12/2022 02:39 PM    PLATELET 985 25/60/5321 02:39 PM       Lab Results   Component Value Date/Time    Sodium 141 08/03/2022 10:29 AM    Potassium 4.9 08/03/2022 10:29 AM    Chloride 108 08/03/2022 10:29 AM    CO2 26 08/03/2022 10:29 AM    Glucose 87 08/03/2022 10:29 AM    BUN 10 08/03/2022 10:29 AM    Creatinine 0.89 08/03/2022 10:29 AM    Calcium 9.7 08/03/2022 10:29 AM       No components found for: TROPQUANT    No results found for: ANNA      Lab Results   Component Value Date/Time    Hemoglobin A1c 5.6 05/20/2016 08:07 AM        Lab Results   Component Value Date/Time    Vitamin B12 459 12/08/2021 09:00 AM       No results found for: ANNA, Mckayla Pia, XBANA    Lab Results   Component Value Date/Time    Cholesterol, total 167 08/03/2022 10:29 AM    HDL Cholesterol 89 08/03/2022 10:29 AM    LDL, calculated 64.6 08/03/2022 10:29 AM    VLDL, calculated 13.4 08/03/2022 10:29 AM    Triglyceride 67 08/03/2022 10:29 AM    CHOL/HDL Ratio 1.9 08/03/2022 10:29 AM         CT Results (recent):  No results found for this or any previous visit. MRI Results (recent):  No results found for this or any previous visit. IR Results (recent):  No results found for this or any previous visit. VAS/US Results (recent):  No results found for this or any previous visit. PHYSICAL EXAM:  General:    Alert, cooperative, no distress, appears stated age. Head:   Normocephalic, without obvious abnormality, atraumatic. Eyes:   Conjunctivae/corneas clear. PERRLA  Nose:  Nares normal. No drainage or sinus tenderness. Throat:    Lips, mucosa, and tongue normal.  No Thrush  Neck:  Supple, symmetrical,  no adenopathy, thyroid: non tender    no carotid bruit and no JVD. Back:    Symmetric,  No CVA tenderness. Lungs:   Clear to auscultation bilaterally. No Wheezing or Rhonchi. No rales. Chest wall:  No tenderness or deformity. No Accessory muscle use. Heart:   Regular rate and rhythm,  no murmur, rub or gallop. Abdomen:   Soft, non-tender. Not distended. Bowel sounds normal. No masses  Extremities: Extremities normal, atraumatic, No cyanosis. No edema. No clubbing  Skin:     Texture, turgor normal. No rashes or lesions. Not Jaundiced  Lymph nodes: Cervical, supraclavicular normal.  Psych:  Good insight. Not depressed. Not anxious or agitated. NEUROLOGICAL EXAM:  Appearance:   The patient is well developed, well nourished, provides a coherent history and is in no acute distress. Mental Status: Oriented to time, place and person. Mood and affect appropriate. Cranial Nerves:   Intact visual fields. Fundi are benign. BILLY, EOM's full, no nystagmus, no ptosis. Facial sensation is normal. Corneal reflexes are intact. Facial movement is symmetric. Hearing is normal bilaterally. Palate is midline with normal sternocleidomastoid and trapezius muscles are normal. Tongue is midline. Motor:  5/5 strength in upper and lower proximal and distal muscles. Normal bulk and tone. No fasciculations. Reflexes:   Deep tendon reflexes 2+/4 and symmetrical.   Sensory:   Normal to touch, pinprick and vibration. Gait:  Normal gait. Tremor:   No tremor noted. Cerebellar:  No cerebellar signs present. Neurovascular:  Normal heart sounds and regular rhythm, peripheral pulses intact, and no carotid bruits. Assesment  1. Intractable chronic migraine without aura and without status migrainosus  Ajovy    2. Migraine-cluster headache syndrome  Nurtec    3. Intractable episodic paroxysmal hemicrania  Nurtec    4. Vitamin D deficiency  Vitamin D replacement    ___________________________________________________  PLAN: Medication and plan discussed with patient      ICD-10-CM ICD-9-CM    1. Intractable chronic migraine without aura and without status migrainosus  G43.719 346.71       2. Migraine-cluster headache syndrome  G44.009 339.00       3. Intractable episodic paroxysmal hemicrania  G44.031 339.03       4. Vitamin D deficiency  E55.9 268.9         Follow-up and Dispositions    Return in about 6 months (around 1/18/2023).                ___________________________________________________    Attending Physician: Stone Melendrez MD

## 2022-07-18 NOTE — TELEPHONE ENCOUNTER
[employer]    . [facility] has questions about the directions for the Toradol and fioricet.  Please call [phone]

## 2022-07-20 NOTE — TELEPHONE ENCOUNTER
[employer] has not gotten updates to toradol & fioricet scripts.  Please call [phone]

## 2022-08-03 ENCOUNTER — OFFICE VISIT (OUTPATIENT)
Dept: INTERNAL MEDICINE CLINIC | Age: 52
End: 2022-08-03
Payer: COMMERCIAL

## 2022-08-03 VITALS
HEART RATE: 62 BPM | BODY MASS INDEX: 30.79 KG/M2 | RESPIRATION RATE: 16 BRPM | WEIGHT: 184.8 LBS | TEMPERATURE: 98.7 F | HEIGHT: 65 IN | OXYGEN SATURATION: 97 % | SYSTOLIC BLOOD PRESSURE: 110 MMHG | DIASTOLIC BLOOD PRESSURE: 70 MMHG

## 2022-08-03 DIAGNOSIS — Z00.00 ANNUAL PHYSICAL EXAM: Primary | ICD-10-CM

## 2022-08-03 DIAGNOSIS — E55.9 VITAMIN D DEFICIENCY: ICD-10-CM

## 2022-08-03 LAB
25(OH)D3 SERPL-MCNC: 54.5 NG/ML (ref 30–100)
ALBUMIN SERPL-MCNC: 3.8 G/DL (ref 3.5–5)
ALBUMIN/GLOB SERPL: 1.3 {RATIO} (ref 1.1–2.2)
ALP SERPL-CCNC: 59 U/L (ref 45–117)
ALT SERPL-CCNC: 22 U/L (ref 12–78)
ANION GAP SERPL CALC-SCNC: 7 MMOL/L (ref 5–15)
AST SERPL-CCNC: 19 U/L (ref 15–37)
BILIRUB SERPL-MCNC: 0.6 MG/DL (ref 0.2–1)
BUN SERPL-MCNC: 10 MG/DL (ref 6–20)
BUN/CREAT SERPL: 11 (ref 12–20)
CALCIUM SERPL-MCNC: 9.7 MG/DL (ref 8.5–10.1)
CHLORIDE SERPL-SCNC: 108 MMOL/L (ref 97–108)
CHOLEST SERPL-MCNC: 167 MG/DL
CO2 SERPL-SCNC: 26 MMOL/L (ref 21–32)
CREAT SERPL-MCNC: 0.89 MG/DL (ref 0.55–1.02)
GLOBULIN SER CALC-MCNC: 3 G/DL (ref 2–4)
GLUCOSE SERPL-MCNC: 87 MG/DL (ref 65–100)
HDLC SERPL-MCNC: 89 MG/DL
HDLC SERPL: 1.9 {RATIO} (ref 0–5)
LDLC SERPL CALC-MCNC: 64.6 MG/DL (ref 0–100)
POTASSIUM SERPL-SCNC: 4.9 MMOL/L (ref 3.5–5.1)
PROT SERPL-MCNC: 6.8 G/DL (ref 6.4–8.2)
SODIUM SERPL-SCNC: 141 MMOL/L (ref 136–145)
TRIGL SERPL-MCNC: 67 MG/DL (ref ?–150)
VLDLC SERPL CALC-MCNC: 13.4 MG/DL

## 2022-08-03 PROCEDURE — 99396 PREV VISIT EST AGE 40-64: CPT | Performed by: FAMILY MEDICINE

## 2022-08-03 RX ORDER — LEVONORGESTREL 52 MG/1
INTRAUTERINE DEVICE INTRAUTERINE
COMMUNITY

## 2022-08-03 NOTE — PROGRESS NOTES
1. \"Have you been to the ER, urgent care clinic since your last visit? Hospitalized since your last visit? \" No    2. \"Have you seen or consulted any other health care providers outside of the [address] [place] [address] since your last visit? \" No     3. For patients aged 39-70: Has the patient had a colonoscopy / FIT/ Cologuard? Yes - Care Gap present. Rooming MA/LPN to request most recent results      If the patient is female:    4. For patients aged 41-77: Has the patient had a mammogram within the past 2 years? Yes - Care Gap present. Most recent result on file due 2025      5. For patients aged 21-65: Has the patient had a pap smear?  YES

## 2022-08-03 NOTE — PROGRESS NOTES
SUBJECTIVE:   Karma Nguyen is a 49 y.o. female who is here for complete physical exam. She is doing well. Physical Exam: Pt specifically denies changes in vision or hearing, trouble with swallowing or taste, CP, SOB, heartburn or upset stomach, change in bowel habits, problems urinating, unusual joint or muscle pains, numbness or tingling in extremities, or skin lesions of concern. She experiences intermittent RUQ pain, which was reviewed by her GYN office. She experiences plantar fascitis, but she does not follow up with a podiatrist. She has almost completed her course of vitamin D. Her insurance has not approved her prescription of Ajovy. Her last colonoscopy was in October. Sleep Quality: She is using melatonin every night to sleep, since she notes an overactive brain at night. She is waking up at 3-4am in the morning. She has not previously tried [place]. She is taking Wellbutrin (between 8am-12pm) and Menopause supplements (contains [employer]). At this time, she is otherwise doing well and has brought no other complaints to my attention today. For a list of the medical issues addressed today, see the assessment and plan below. PREVENTIVE:  Colonoscopy: UTD ( Oct 2022 with 10 yr follow up)          PMH:   Past Medical History:   Diagnosis Date    Headache     Headache(784.0)     migraine    Migraine 2018    TMJ (dislocation of temporomandibular joint)        PSH:  has a past surgical history that includes hx orthopaedic and hx cyst removal (2003). Allergies: has No Known Allergies. Meds:   Current Outpatient Medications   Medication Sig    levonorgestreL (Mirena) 20 mcg/24 hours (7 yrs) 52 mg IUD Mirena 20 mcg/24 hours (7 yrs) 52 mg intrauterine device   Take 1 device by intrauterine route. pyridoxine-melatonin 5-1 mg tab Take 1 mg by mouth as needed.     fremanezumab-vfrm (Ajovy Autoinjector) 225 mg/1.5 mL auto-injector 1.5 mL by SubCUTAneous route every month.    ketorolac (TORADOL) 10 mg tablet Take 1 Tablet by mouth as needed for Pain. Takes with Benadryl and Toradol when she has to take them    butalbital-acetaminophen-caffeine (FIORICET, ESGIC) -40 mg per tablet Take 1 Tablet by mouth as needed for Headache. Takes with Benadryl and Toradol when she has to take them    ondansetron (ZOFRAN ODT) 4 mg disintegrating tablet Take 1 Tablet by mouth every eight (8) hours as needed for Nausea or Vomiting. buPROPion XL (WELLBUTRIN XL) 300 mg XL tablet Take 1 Tablet by mouth every morning.    ergocalciferol (ERGOCALCIFEROL) 1,250 mcg (50,000 unit) capsule Take 1 Capsule by mouth every seven (7) days. hydrocortisone (HYTONE) 2.5 % ointment     mupirocin (BACTROBAN) 2 % ointment APPLY TO AFFECTED AREA TWICE A DAY UNTIL HEALED    tiZANidine (ZANAFLEX) 4 mg tablet Take 1 Tablet by mouth nightly. (Patient taking differently: Take 4 mg by mouth as needed.)    diphenhydrAMINE (BENADRYL) 25 mg tablet Take 25 mg by mouth as needed. Takes with Benadryl and Toradol when she has to take them    rizatriptan (MAXALT) 10 mg tablet Take 10 mg by mouth once as needed. No current facility-administered medications for this visit. Fam hx: family history includes Alzheimer's Disease in her paternal grandmother; Cancer in her father, paternal grandfather, and paternal uncle; Glaucoma in her mother; Hypertension in her brother and mother; Other in her father and mother; Stroke in her paternal grandmother. Soc hx:  reports that she has never smoked. She has never used smokeless tobacco. She reports that she does not currently use alcohol. She reports that she does not use drugs.       Review of Systems - History obtained from the patient  General ROS: negative  Psychological ROS: negative  Ophthalmic ROS: negative  ENT ROS: negative  Respiratory ROS: no cough, shortness of breath, or wheezing  Cardiovascular ROS: no chest pain or dyspnea on exertion  Gastrointestinal ROS: no abdominal pain, change in bowel habits, or black or bloody stools  Genito-Urinary ROS: negative  Musculoskeletal ROS: negative  Neurological ROS: negative  Dermatological ROS: negative    OBJECTIVE:   Vitals: Visit Vitals  /70 (BP 1 Location: Left arm, BP Patient Position: Sitting, BP Cuff Size: Small adult)   Pulse 62   Temp 98.7 °F (37.1 °C) (Temporal)   Resp 16   Ht 5' 5\" (1.651 m)   Wt 184 lb 12.8 oz (83.8 kg)   SpO2 97%   BMI 30.75 kg/m²     Gen: Pleasant 49 y.o. female in NAD. HEENT: PERRLA. EOMI. OP moist and pink. EARS: TMs normal and canals equal bilaterally. NECK: Supple. No LAD. No thyromegaly. HEART: RRR, No M/G/R.     LUNGS: CTAB No W/R. ABDOMEN: S, NT, ND, BS+. EXTREMITIES: Warm. No C/C/E.    MUSCULOSKELETAL: Normal ROM, muscle strength 5/5 all groups. NEURO: Alert and oriented x 3. Cranial nerves grossly intact. No focal sensory or motor deficits noted. SKIN: Warm. Dry. No rashes or other lesions noted. ASSESSMENT/ PLAN:     Diagnoses and all orders for this visit:    1. Annual physical exam  -     METABOLIC PANEL, COMPREHENSIVE; Future  -     LIPID PANEL; Future  -     VITAMIN D, 25 HYDROXY; Future  -     REFERRAL TO PODIATRY    2. Vitamin D deficiency  -     VITAMIN D, 25 HYDROXY; Future      Reji Roberts's physical exam was normal. Pt was given lab orders for a CMP and lipid panel to have done when she is fasting. I referred pt to Dr. Jamel Ibarra. Vitamin D Deficiency: I ordered a vitamin D test.     She will complete a nurse visit for shingles shot. Follow-up and Dispositions    Return in about 1 year (around 8/3/2023) for CPE. I have reviewed the patient's medications and risks/side effects/benefits were discussed. Diagnosis(-es) explained to patient and questions answered. Literature provided where appropriate.      Written by Smith Gotti, as dictated by Gee Tovar MD.

## 2022-08-12 DIAGNOSIS — G44.009 MIGRAINE-CLUSTER HEADACHE SYNDROME: Primary | ICD-10-CM

## 2022-08-12 DIAGNOSIS — G44.009 MIGRAINE-CLUSTER HEADACHE SYNDROME: ICD-10-CM

## 2022-08-12 RX ORDER — KETOROLAC TROMETHAMINE 10 MG/1
10 TABLET, FILM COATED ORAL
Qty: 10 TABLET | Refills: 0 | Status: SHIPPED | OUTPATIENT
Start: 2022-08-12

## 2022-08-12 RX ORDER — BUTALBITAL, ACETAMINOPHEN AND CAFFEINE 50; 325; 40 MG/1; MG/1; MG/1
1 TABLET ORAL
Qty: 20 TABLET | Refills: 2 | Status: SHIPPED | OUTPATIENT
Start: 2022-08-12

## 2022-08-12 RX ORDER — KETOROLAC TROMETHAMINE 10 MG/1
10 TABLET, FILM COATED ORAL AS NEEDED
Qty: 10 TABLET | Refills: 0 | Status: SHIPPED | OUTPATIENT
Start: 2022-08-12 | End: 2022-08-12 | Stop reason: SDUPTHER

## 2022-08-12 RX ORDER — BUTALBITAL, ACETAMINOPHEN AND CAFFEINE 50; 325; 40 MG/1; MG/1; MG/1
1 TABLET ORAL AS NEEDED
Qty: 20 TABLET | Refills: 2 | Status: SHIPPED | OUTPATIENT
Start: 2022-08-12 | End: 2022-08-12 | Stop reason: SDUPTHER

## 2022-08-12 NOTE — TELEPHONE ENCOUNTER
Russ Mckoy MD  You 1 hour ago (9:04 AM)     TS    Rx sent in as requested       Mirza Morgan MD 2 hours ago (8:40 AM)     MS    Please advise on the Fioricet and Toradol scripts, thanks.

## 2022-08-15 NOTE — TELEPHONE ENCOUNTER
MD Freda Orantes LPN 3 days ago     TS    Sorry about that, I just reordered the prescription that was on the chart, not realizing there were no directions on it, I redid it again, should be okay now          Noted, both medications have been resent with every 8 hours as needed.

## 2022-08-23 RX ORDER — FREMANEZUMAB-VFRM 225 MG/1.5ML
225 INJECTION SUBCUTANEOUS
Qty: 1.5 ML | Refills: 4 | Status: CANCELLED | OUTPATIENT
Start: 2022-08-23

## 2022-08-23 NOTE — TELEPHONE ENCOUNTER
This was sent on 7/18/22 with 4 refills. Called pharmacy and spoke to pharmacist-he advised the Ajovy is needing a PA on it. Will send to PA auth.

## 2022-08-24 ENCOUNTER — PATIENT MESSAGE (OUTPATIENT)
Dept: NEUROLOGY | Age: 52
End: 2022-08-24

## 2022-08-24 NOTE — TELEPHONE ENCOUNTER
Good Afternoon Radha,    Nurtec does not replace Ajovy but is in addition to. Nurtec can be taken once a day AS NEEDED for severe headache. Do not exceed 1 tablet in 24 hours.  So glad to hear that the Ajovy is helping.  :-)

## 2022-08-26 ENCOUNTER — TELEPHONE (OUTPATIENT)
Dept: NEUROLOGY | Age: 52
End: 2022-08-26

## 2022-08-26 NOTE — TELEPHONE ENCOUNTER
Re: Opal SCHULTE request, created case in [facility]# [id number], submitted and awaiting update.

## 2022-08-29 NOTE — TELEPHONE ENCOUNTER
Re: Barnie Denver PA approval letter, effective 08/26/22-08/26/23 auth# [id number]. Scanned to chart. Called pharmacy and left v/m.

## 2022-08-30 NOTE — TELEPHONE ENCOUNTER
Berta Arellano  Received: 4 days ago  Oswaldo Saxena sent to Sana Newby LPN  Caller: Unspecified (1 week ago, 11:07 AM)  Phone Number: [phone]     FYI only: PA has been started. Thanks. This was approved. See other encounter. Will close this one out.

## 2022-10-14 ENCOUNTER — OFFICE VISIT (OUTPATIENT)
Dept: ENDOCRINOLOGY | Age: 52
End: 2022-10-14
Payer: COMMERCIAL

## 2022-10-14 VITALS
DIASTOLIC BLOOD PRESSURE: 85 MMHG | SYSTOLIC BLOOD PRESSURE: 135 MMHG | BODY MASS INDEX: 31.22 KG/M2 | HEART RATE: 56 BPM | HEIGHT: 65 IN | WEIGHT: 187.4 LBS

## 2022-10-14 DIAGNOSIS — L68.0 HIRSUTISM: ICD-10-CM

## 2022-10-14 DIAGNOSIS — E55.9 VITAMIN D DEFICIENCY: ICD-10-CM

## 2022-10-14 DIAGNOSIS — R94.6 BORDERLINE ABNORMAL TFTS: Primary | ICD-10-CM

## 2022-10-14 DIAGNOSIS — N91.4 SECONDARY OLIGOMENORRHEA: ICD-10-CM

## 2022-10-14 PROCEDURE — 99204 OFFICE O/P NEW MOD 45 MIN: CPT | Performed by: INTERNAL MEDICINE

## 2022-10-14 NOTE — PATIENT INSTRUCTIONS
1) I will check your thyroid in more detail to screen for hashimoto's with thyroid antibodies and determine if you may benefit from a trial of thyroid hormone. 2) I will check your vitamin D and testosterone (for polycystic ovarian syndrome) and your cortisol level. 3)  I will send you a message through [employer] with your lab results to determine a plan.

## 2022-10-14 NOTE — PROGRESS NOTES
Chief Complaint   Patient presents with    New Patient     PCP and pharmacy confirmed     Thyroid Problem     History of Present Illness: Selena Odonnell is a 47 y.o. female who is a new patient for thyroid. She is friends with Michelle No and Kaitlin Angel who recommended she come to see me. Over the past year has noticed more dyspnea with going up the stairs of her house. Gained about 25 lbs from 3/21 to 3/22. Had been on some prednisone during this time for headaches. Tries to work out on [employer] or walking most days of the week cardio 3 days a week and some strength training too. Tends to be more tired despite eating salads for lunch and doing some intermittent fasting. Throat gets dry at night and has to take a cough drop but no GERD. Has more pain in her wrists limiting ability to do push-ups. Seeing a chiropractor for this without success. Has been taking migraine meds since 2018 and have become more frequent and intense the past 2 years. Has had some plantar fasciitis that has been worse recently and has a lot of feet pain and stretches to help this. Has had more right hip flexor tightness and tries to do yoga and stretching to help. Just had an IUD inserted in 7/22 and has spotting for about 2 weeks a month and is under the care of Lisset Crespo and was put in to help with her migraines. No breast pain or discharge. Bowels were on the slower side and salads have helped and now they are fairly regular. No hair loss. Does have some dry skin on her hands and feet. Some brittle nails. Tends to be colder at times. Her vitamin D was low at 29.7 in 5/22 and took 8 weeks of ergocalciferol and level was up to 54 in 8/22. Did feel a little better on the vitamin D with less fatigue and less brain fog and these symptoms are starting to return off the treatment. Her TSH was 2.17 and FT4 1.17 in 5/22 and TSH was 2.4 in 5/16 and 1.6 in 11/15. Has never been told she had cysts on her ovaries.   Does have to pluck some facial hair under her chin and around her lip about once a week. Periods had been irregular about 4 per year prior to her son being born and then became more regular for awhile and then started being irregular again and then about 2 years ago were more regular but heavier. No abd striae. Past Medical History:   Diagnosis Date    Migraine 2018    TMJ (dislocation of temporomandibular joint)     Vitamin D deficiency      Past Surgical History:   Procedure Laterality Date    HX ORTHOPAEDIC  2003    ganglion cyst removal from right wrist     Current Outpatient Medications   Medication Sig    OTHER AM - PM perimenopoause vitamin twice daily    ACETAMINOPHEN PO Take  by mouth. As needed    rimegepant (NURTEC) 75 mg disintegrating tablet Take 1 tablet p.o. as needed for severe headache, not to exceed 1 tablet in 24 hours. butalbital-acetaminophen-caffeine (FIORICET, ESGIC) -40 mg per tablet Take 1 Tablet by mouth every six (6) hours as needed for Headache. Takes with Benadryl and Toradol when she has to take them    ketorolac (TORADOL) 10 mg tablet Take 1 Tablet by mouth every eight (8) hours as needed for Pain. Takes with Benadryl and Toradol when she has to take them    levonorgestreL (Mirena) 20 mcg/24 hours (7 yrs) 52 mg IUD Mirena 20 mcg/24 hours (7 yrs) 52 mg intrauterine device   Take 1 device by intrauterine route. pyridoxine-melatonin 5-1 mg tab Take 1 mg by mouth as needed. fremanezumab-vfrm (Ajovy Autoinjector) 225 mg/1.5 mL auto-injector 1.5 mL by SubCUTAneous route every month. ondansetron (ZOFRAN ODT) 4 mg disintegrating tablet Take 1 Tablet by mouth every eight (8) hours as needed for Nausea or Vomiting. buPROPion XL (WELLBUTRIN XL) 300 mg XL tablet Take 1 Tablet by mouth every morning.     hydrocortisone (HYTONE) 2.5 % ointment     mupirocin (BACTROBAN) 2 % ointment APPLY TO AFFECTED AREA TWICE A DAY UNTIL HEALED    tiZANidine (ZANAFLEX) 4 mg tablet Take 1 Tablet by mouth nightly. (Patient taking differently: Take 4 mg by mouth as needed.)    diphenhydrAMINE (BENADRYL) 25 mg tablet Take 25 mg by mouth as needed. Takes with Benadryl and Toradol when she has to take them    rizatriptan (MAXALT) 10 mg tablet Take 10 mg by mouth once as needed. No current facility-administered medications for this visit. No Known Allergies      Family History   Problem Relation Age of Onset    Hypertension Mother     Glaucoma Mother     Other Mother         shingles    Cancer Father         was never diagnosed    Other Father         quadriplegic from motor vehicle accident    Hypertension Brother     Stroke Paternal Grandmother     Alzheimer's Disease Paternal Grandmother     Cancer Paternal Grandfather         lung    Cancer Paternal Uncle         pancreatic    Thyroid Disease Neg Hx      Social History     Socioeconomic History    Marital status:      Spouse name: Not on file    Number of children: Not on file    Years of education: Not on file    Highest education level: Not on file   Occupational History    Occupation: marketing   Tobacco Use    Smoking status: Never    Smokeless tobacco: Never   Vaping Use    Vaping Use: Never used   Substance and Sexual Activity    Alcohol use: Not Currently     Comment: a few drinks per week    Drug use: Never    Sexual activity: Yes     Partners: Male     Birth control/protection: Rhythm   Other Topics Concern     Service Not Asked    Blood Transfusions Not Asked    Caffeine Concern Not Asked    Occupational Exposure Not Asked    Hobby Hazards Not Asked    Sleep Concern No    Stress Concern Yes    Weight Concern No    Special Diet Yes    Back Care Not Asked    Exercise Yes    Bike Helmet Not Asked    Seat Belt Yes    Self-Exams Yes   Social History Narrative    Lives in [place] with her  and 15 yo son. 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   Likes to exercise and garden and used to bake more in the past.     Social Determinants of Health     Financial Resource Strain: Not on file   Food Insecurity: Not on file   Transportation Needs: Not on file   Physical Activity: Not on file   Stress: Not on file   Social Connections: Not on file   Intimate Partner Violence: Not on file   Housing Stability: Not on file     Review of Systems: per HPI    Physical Examination:  Blood pressure 135/85, pulse (!) 56, height 5' 5\" (1.651 m), weight 187 lb 6.4 oz (85 kg). General: pleasant, no distress, good eye contact  HEENT: no exopthalmos, no periorbital edema, no scleral/conjunctival injection, EOMI, no lid lag or stare  Neck: supple, small amount of palpable thyroid tissue without nodules or thyroid bruits, no masses, lymph nodes, or carotid bruits, no supraclavicular or dorsocervical fat pads  Cardiovascular: regular, normal rate, normal S1 and S2, no murmurs/rubs/gallops   Respiratory: clear to auscultation bilaterally  Gastrointestinal: soft, nontender, nondistended, no masses, no hepatosplenomegaly  Musculoskeletal: no proximal muscle weakness in upper or lower extremities  Integumentary: no acanthosis nigricans, no rashes, no edema  Neurological: reflexes 2+ at biceps, no tremor  Psychiatric: normal mood and affect    Data Reviewed:   Component      Latest Ref Rng & Units 8/3/2022 5/12/2022   WBC      3.4 - 10.8 x10E3/uL  5.4   RBC      3.77 - 5.28 x10E6/uL  5.13   HGB      11.1 - 15.9 g/dL  14.5   HCT      34.0 - 46.6 %  43.6   MCV      79 - 97 fL  85   MCH      26.6 - 33.0 pg  28.3   MCHC      31.5 - 35.7 g/dL  33.3   RDW      11.7 - 15.4 %  12.4   PLATELET      304 - 415 x10E3/uL  300   NEUTROPHILS      Not Estab. %  57   Lymphocytes      Not Estab. %  31   MONOCYTES      Not Estab. %  9   EOSINOPHILS      Not Estab. %  2   BASOPHILS      Not Estab. %  1   ABS. NEUTROPHILS      1.4 - 7.0 x10E3/uL  3.1   Abs Lymphocytes      0.7 - 3.1 x10E3/uL  1.7   ABS. MONOCYTES      0.1 - 0.9 x10E3/uL  0.5   ABS.  EOSINOPHILS      0.0 - 0.4 x10E3/uL  0.1   ABS. BASOPHILS      0.0 - 0.2 x10E3/uL  0.1   IMMATURE GRANULOCYTES      Not Estab. %  0   ABS. IMM. GRANS.      0.0 - 0.1 x10E3/uL  0.0   Sodium      136 - 145 mmol/L 141 139   Potassium      3.5 - 5.1 mmol/L 4.9 4.5   Chloride      97 - 108 mmol/L 108 100   CO2      21 - 32 mmol/L 26 23   Anion gap      5 - 15 mmol/L 7    Glucose      65 - 100 mg/dL 87 86   BUN      6 - 20 MG/DL 10 11   Creatinine      0.55 - 1.02 MG/DL 0.89 0.88   BUN/Creatinine ratio      12 - 20   11 (L) 13   GFR est AA      >60 ml/min/1.73m2 >60    GFR est non-AA      >60 ml/min/1.73m2 >60    Calcium      8.5 - 10.1 MG/DL 9.7 9.5   Bilirubin, total      0.2 - 1.0 MG/DL 0.6 0.3   ALT      12 - 78 U/L 22 12   AST      15 - 37 U/L 19 19   Alk.  phosphatase      45 - 117 U/L 59 74   Protein, total      6.4 - 8.2 g/dL 6.8 7.0   Albumin      3.5 - 5.0 g/dL 3.8 4.4   Globulin      2.0 - 4.0 g/dL 3.0    A-G Ratio      1.1 - 2.2   1.3 1.7   eGFR      >59 mL/min/1.73  80   GLOBULIN, TOTAL      1.5 - 4.5 g/dL  2.6   Specific Gravity      1.005 - 1.030  1.007   pH (UA)      5.0 - 7.5  6.5   Color      Yellow  Yellow   Appearance      Clear  Clear   Leukocyte Esterase      Negative  3+ (A)   Protein      Negative/Trace  Negative   Glucose      Negative  Negative   Ketone      Negative  Negative   Blood      Negative  Negative   Bilirubin      Negative  Negative   Urobilinogen      0.2 - 1.0 mg/dL  0.2   Nitrites      Negative  Negative   Microscopic Examination        See additional order   Cholesterol, total      <200 MG/    Triglyceride      <150 MG/DL 67    HDL Cholesterol      MG/DL 89    LDL, calculated      0 - 100 MG/DL 64.6    VLDL, calculated      MG/DL 13.4    CHOL/HDL Ratio      0.0 - 5.0   1.9    WBC      0 - 5 /hpf  0-5   RBC      0 - 2 /hpf  0-2   Epithelial cells      0 - 10 /hpf  0-10   Casts      None seen /lpf  None seen   Bacteria      None seen/Few  Few   VITAMIN D, 25-HYDROXY      30.0 - 100.0 ng/mL  29.7 (L)   Sed rate (ESR)      0 - 40 mm/hr  13   T4, Free      0.82 - 1.77 ng/dL  1.17   TSH      0.450 - 4.500 uIU/mL  2.170   SARS-COV-2 PCR, POC      Negative  Negative   Urine Culture, Routine        Mixed urogenital cj . . .   Vitamin D 25-Hydroxy      30 - 100 ng/mL 54.5        Assessment/Plan:   1. Borderline abnormal TFTs: Her TSH was 1.6 in 11/15 and 2.4 in 5/16 and 2.17 and FT4 1.17 in 5/22. She has some mild hypothyroid symptoms so will screen for Hashimoto's. - check TSH, free T4 and Thyroid antibody panel today       2. Vitamin D deficiency: Her vitamin D was low at 29.7 in 5/22 and took 8 weeks of ergocalciferol and level was up to 54 in 8/22 but has been off supplementation since then. - check Vitamin D 25-OH level today        3. Secondary oligomenorrhea: has some feature of PCOS such as irregular periods and facial hair growth so will screen for this along with Cushing's. - check total testosterone and cortisol today        4. Hirsutism: possibly from PCOS so will evaluate as above. Patient Instructions   1) I will check your thyroid in more detail to screen for hashimoto's with thyroid antibodies and determine if you may benefit from a trial of thyroid hormone. 2) I will check your vitamin D and testosterone (for polycystic ovarian syndrome) and your cortisol level. 3)  I will send you a message through [employer] with your lab results to determine a plan. Follow-up and Dispositions    Return in about 6 months (around 4/14/2023).              Copy sent to:  John Yung MD as PCP - [facility]  Carin Olsen NP (Nurse Practitioner)      Lab follow up: 10/20/22  Component      Latest Ref Rng & Units 10/17/2022   Thyroid peroxidase Ab      0 - 34 IU/mL 36 (H)   Thyroglobulin Ab      0.0 - 0.9 IU/mL <1.0   TSH      0.36 - 3.74 uIU/mL 2.18   T4, Free      0.8 - 1.5 NG/DL 1.1   Cortisol, a.m.      4.30 - 22.45 ug/dL 10.6   Testosterone      ng/dL 22.7   Vitamin D 25-Hydroxy 30 - 100 ng/mL 31.2     Sent her the following message through [email or web address]:  Your vitamin D level is 31 which is normal but down from 54 after taking the 8 weeks of ergocalciferol (once weekly vitamin D). Goal is over 30. I recommend taking 2000 of over the counter vitamin D3 daily to keep your levels at goal.  -------------------------------------------------------------------------------------------------------------------  Your cortisol level is normal at 10.6 and your testosterone level is normal at 22.7 so you don't appear to have any trouble with your adrenal gland or PCOS as a cause of your symptoms. -------------------------------------------------------------------------------------------------------------------  TSH is a thyroid test.  Your level is 2.18 which is normal but above goal of 0.5-2.0 and your TPO antibody is slightly high at 36 which confirms you do have Hashimoto's as a cause of your mild HYPOthyroidism. This test goes opposite of your thyroid function and suggests you would benefit from starting a low dose of levothyroxine. I will begin levothyroxine 75 mcg daily and sent this to your local pharmacy. Take Levothyroxine either in the morning with just water, at least 30 minutes before breakfast and coffee and all other pills, or take it at bedtime on any empty stomach 2-3 hours after dinner. This must be  from vitamins, calcium, or iron by at least 4 hours. If you ever do miss a dose of levothyroxine, it's okay to take 2 pills the next day to catch up on your dose.   The goal is always to get 7 pills per week and even if you have to double up several days in a row to make up for missed doses, this is better than letting your weekly level fall.  -------------------------------------------------------------------------------------------------------------------  I will mail you 2 lab slips, one for 6 weeks and one to bring to [facility] to use to have your labs drawn in the 1-2 weeks prior to your next visit. Please make a note on your calendar in 6 weeks and at least 3-4 days prior to your visit to have your labs drawn that also reminds you to bring the lab slip in case [facility] does not have the orders on file. Thanks. Lab follow up: 12/06/22  Component      Latest Ref Rng & Units 12/5/2022 12/5/2022          12:00 AM 12:00 AM   T4, Free      0.82 - 1.77 ng/dL  1.51   TSH      0.450 - 4.500 uIU/mL 1.400      Sent her the following message through [email or web address]:  TSH is a thyroid test.  Your level is 1.4 which is normal and at goal of 0.45-2.0. This test goes opposite of your thyroid dose and suggests your dose of levothyroxine is working well. How are you feeling on your current dose? If feeling better, I'm happy to keep your dose the same but if you are still having some fatigue, I'm happy to try and slightly increase your dose to 88 mcg daily. Let me know when you have a chance. Addendum: 12/08/22  We had the following [employer]:    Ok. Then why don't you try taking 1 tab 6 days a week and 2 tabs one day a week to use up these pills. Once you are about to run out, let me know and then I change to the 88 mcg tabs to take 1 tab 7 days a week.  ===View-only below this line===      ----- Message -----       Brooke Mccain       BABITA:50[birth year]  4:38 PM EST         To:User Message Message List    Subject:lab results    Hi Dr Jo Ann Norton to see the numbers are moving in the right direction. I am feeling a little better, but am still experiencing fatigue in the evenings. Now, I'm mostly just tired and am not falling asleep. With that exception, I mostly feel the same. Perhaps a slight increase would be fine. I do still have a significant number of the original dosage left (I started with a 90 day supply) - should we wait until I need a refill?

## 2022-10-20 RX ORDER — LEVOTHYROXINE SODIUM 75 UG/1
75 TABLET ORAL
Qty: 90 TABLET | Refills: 3 | Status: SHIPPED | OUTPATIENT
Start: 2022-10-20

## 2022-11-15 ENCOUNTER — TELEPHONE (OUTPATIENT)
Dept: NEUROLOGY | Age: 52
End: 2022-11-15

## 2022-11-15 NOTE — TELEPHONE ENCOUNTER
Voicemail:    Patient called to speak with Nurse. Stated that she believes she is allergic to her Ajovy. She said the last time she used it, her whole right arm swelled up and she had to take benadryl. Pt would like to know if [place] are  any medications she could take in place of the Ajovy. Please advise.  [phone]

## 2022-11-16 NOTE — TELEPHONE ENCOUNTER
Call placed to patient. 2 identifiers received. Patient stated she had an allergic reaction to Ajovy. Advised patient that Dr. Kamaljit Cole was out of the office until 11/21/22. Advised patient not to take Ajovy as it could produce adverse reactions. Patient indicated understanding. Patient stated she had fioricet and nurtec to use for migraines. Advised patient that either drug could be helpful. Also advised patient that she did not have a scheduled appointment with our practice. Patient requested an appointment with Dr. Moraima Landeros. Advised patient I would make the appointment and send a reminder in the mail. Patient indicated understanding.

## 2022-11-25 RX ORDER — BUPROPION HYDROCHLORIDE 150 MG/1
TABLET ORAL
Qty: 90 TABLET | Refills: 2 | Status: SHIPPED | OUTPATIENT
Start: 2022-11-25

## 2022-12-01 DIAGNOSIS — R94.6 BORDERLINE ABNORMAL TFTS: ICD-10-CM

## 2023-01-08 RX ORDER — LEVOTHYROXINE SODIUM 88 UG/1
TABLET ORAL
Qty: 90 TABLET | Refills: 3 | Status: SHIPPED | OUTPATIENT
Start: 2023-01-08

## 2023-02-22 DIAGNOSIS — G43.719 INTRACTABLE CHRONIC MIGRAINE WITHOUT AURA AND WITHOUT STATUS MIGRAINOSUS: ICD-10-CM

## 2023-02-22 DIAGNOSIS — G44.031 INTRACTABLE EPISODIC PAROXYSMAL HEMICRANIA: ICD-10-CM

## 2023-02-22 DIAGNOSIS — G44.009 MIGRAINE-CLUSTER HEADACHE SYNDROME: ICD-10-CM

## 2023-02-28 RX ORDER — TIZANIDINE 4 MG/1
4 TABLET ORAL
Qty: 30 TABLET | Refills: 3 | Status: SHIPPED | OUTPATIENT
Start: 2023-02-28

## 2023-02-28 NOTE — TELEPHONE ENCOUNTER
Received fax from [facility] requesting refill on tizanidine. Last OV with Dr Sibley Card 7/18/22-sees you on 6/15/22.  Last refilled on 3/16/22 # 30  3 r/f

## 2023-03-31 DIAGNOSIS — L68.0 HIRSUTISM: ICD-10-CM

## 2023-03-31 DIAGNOSIS — R94.6 BORDERLINE ABNORMAL TFTS: ICD-10-CM

## 2023-03-31 DIAGNOSIS — N91.4 SECONDARY OLIGOMENORRHEA: ICD-10-CM

## 2023-03-31 DIAGNOSIS — E55.9 VITAMIN D DEFICIENCY: ICD-10-CM

## 2023-04-23 DIAGNOSIS — E03.8 HYPOTHYROIDISM DUE TO HASHIMOTO'S THYROIDITIS: Primary | ICD-10-CM

## 2023-04-23 DIAGNOSIS — E55.9 VITAMIN D DEFICIENCY: ICD-10-CM

## 2023-04-23 DIAGNOSIS — E06.3 HYPOTHYROIDISM DUE TO HASHIMOTO'S THYROIDITIS: Primary | ICD-10-CM

## 2023-04-24 DIAGNOSIS — E06.3 HYPOTHYROIDISM DUE TO HASHIMOTO'S THYROIDITIS: Primary | ICD-10-CM

## 2023-04-24 DIAGNOSIS — E03.8 HYPOTHYROIDISM DUE TO HASHIMOTO'S THYROIDITIS: Primary | ICD-10-CM

## 2023-04-24 DIAGNOSIS — E55.9 VITAMIN D DEFICIENCY: ICD-10-CM

## 2023-05-16 ENCOUNTER — TELEPHONE (OUTPATIENT)
Age: 53
End: 2023-05-16

## 2023-05-16 NOTE — TELEPHONE ENCOUNTER
Please call pt to let her know she has an unread message in [email or web address]:    Do you want to try staying on the brand unithroid at 88 mcg daily and give it a few more weeks or do you want to go back to the generic levothyroxine and add some T3 in the form of liothyronine instead? Let me know when you have a chance.

## 2023-05-22 RX ORDER — LEVONORGESTREL 52 MG/1
INTRAUTERINE DEVICE INTRAUTERINE
COMMUNITY

## 2023-05-22 RX ORDER — AMOXICILLIN 500 MG/1
CAPSULE ORAL
COMMUNITY
Start: 2023-04-12 | End: 2023-06-15

## 2023-05-22 RX ORDER — TIZANIDINE 4 MG/1
1 TABLET ORAL NIGHTLY
COMMUNITY
Start: 2023-02-28 | End: 2023-07-14 | Stop reason: SDUPTHER

## 2023-05-22 RX ORDER — ONDANSETRON 4 MG/1
4 TABLET, ORALLY DISINTEGRATING ORAL EVERY 8 HOURS PRN
COMMUNITY
Start: 2022-07-02

## 2023-05-22 RX ORDER — KETOROLAC TROMETHAMINE 10 MG/1
10 TABLET, FILM COATED ORAL EVERY 8 HOURS PRN
COMMUNITY
Start: 2022-08-12

## 2023-05-22 RX ORDER — RIZATRIPTAN BENZOATE 10 MG/1
10 TABLET ORAL
COMMUNITY
End: 2023-06-15 | Stop reason: SDUPTHER

## 2023-05-22 RX ORDER — BUPROPION HYDROCHLORIDE 150 MG/1
1 TABLET ORAL EVERY MORNING
COMMUNITY
Start: 2022-11-25

## 2023-05-22 RX ORDER — BUTALBITAL, ACETAMINOPHEN AND CAFFEINE 50; 325; 40 MG/1; MG/1; MG/1
1 TABLET ORAL EVERY 6 HOURS PRN
COMMUNITY
Start: 2022-08-12

## 2023-06-26 DIAGNOSIS — E03.8 HYPOTHYROIDISM DUE TO HASHIMOTO'S THYROIDITIS: ICD-10-CM

## 2023-06-26 DIAGNOSIS — E06.3 HYPOTHYROIDISM DUE TO HASHIMOTO'S THYROIDITIS: ICD-10-CM

## 2023-06-26 DIAGNOSIS — E55.9 VITAMIN D DEFICIENCY: ICD-10-CM

## 2023-06-26 LAB
T4 FREE SERPL-MCNC: 1.1 NG/DL (ref 0.8–1.5)
TSH SERPL DL<=0.05 MIU/L-ACNC: 0.77 UIU/ML (ref 0.36–3.74)

## 2023-06-28 LAB — T3 SERPL-MCNC: 144 NG/DL (ref 71–180)

## 2023-07-13 ENCOUNTER — TELEPHONE (OUTPATIENT)
Age: 53
End: 2023-07-13

## 2023-07-13 NOTE — TELEPHONE ENCOUNTER
Noted, sent refill to [facility] for approval and to send to the pharmacy. Will close out this encounter.

## 2023-07-13 NOTE — TELEPHONE ENCOUNTER
Pt called stating that she needs a new script for her Tizanidine as it has  and she needs it refilled.      [facility] ([phone])

## 2023-07-14 RX ORDER — TIZANIDINE 4 MG/1
4 TABLET ORAL NIGHTLY
Qty: 30 TABLET | Refills: 5 | Status: SHIPPED | OUTPATIENT
Start: 2023-07-14

## 2023-08-11 RX ORDER — LEVOTHYROXINE SODIUM 88 UG/1
TABLET ORAL
Qty: 30 TABLET | Refills: 0 | Status: SHIPPED | OUTPATIENT
Start: 2023-08-11 | End: 2023-09-10

## 2023-08-11 NOTE — TELEPHONE ENCOUNTER
----- Message from Delia Francis sent at 2023  9:14 AM EDT -----  Regarding: Levo 88mg  Contact: [phone]  Good morning Doctor  Will you please send a refill for the levothyroxine over to my pharmacy? For some reason, it shows as . Thank you!   Rafael Noguera

## 2023-09-10 RX ORDER — LEVOTHYROXINE SODIUM 88 UG/1
TABLET ORAL
Qty: 85 TABLET | Refills: 3 | Status: SHIPPED | OUTPATIENT
Start: 2023-09-10

## 2023-10-11 SDOH — ECONOMIC STABILITY: TRANSPORTATION INSECURITY
IN THE PAST 12 MONTHS, HAS LACK OF TRANSPORTATION KEPT YOU FROM MEETINGS, WORK, OR FROM GETTING THINGS NEEDED FOR DAILY LIVING?: NO

## 2023-10-11 SDOH — ECONOMIC STABILITY: FOOD INSECURITY: WITHIN THE PAST 12 MONTHS, THE FOOD YOU BOUGHT JUST DIDN'T LAST AND YOU DIDN'T HAVE MONEY TO GET MORE.: NEVER TRUE

## 2023-10-11 SDOH — ECONOMIC STABILITY: FOOD INSECURITY: WITHIN THE PAST 12 MONTHS, YOU WORRIED THAT YOUR FOOD WOULD RUN OUT BEFORE YOU GOT MONEY TO BUY MORE.: NEVER TRUE

## 2023-10-11 SDOH — ECONOMIC STABILITY: INCOME INSECURITY: HOW HARD IS IT FOR YOU TO PAY FOR THE VERY BASICS LIKE FOOD, HOUSING, MEDICAL CARE, AND HEATING?: NOT VERY HARD

## 2023-10-11 SDOH — ECONOMIC STABILITY: HOUSING INSECURITY
IN THE LAST 12 MONTHS, WAS THERE A TIME WHEN YOU DID NOT HAVE A STEADY PLACE TO SLEEP OR SLEPT IN A SHELTER (INCLUDING NOW)?: NO

## 2023-10-12 ENCOUNTER — OFFICE VISIT (OUTPATIENT)
Age: 53
End: 2023-10-12
Payer: COMMERCIAL

## 2023-10-12 VITALS
DIASTOLIC BLOOD PRESSURE: 66 MMHG | OXYGEN SATURATION: 98 % | HEART RATE: 54 BPM | WEIGHT: 194 LBS | RESPIRATION RATE: 19 BRPM | BODY MASS INDEX: 32.32 KG/M2 | SYSTOLIC BLOOD PRESSURE: 109 MMHG | TEMPERATURE: 98.1 F | HEIGHT: 65 IN

## 2023-10-12 DIAGNOSIS — F51.01 PRIMARY INSOMNIA: ICD-10-CM

## 2023-10-12 DIAGNOSIS — E03.9 ACQUIRED HYPOTHYROIDISM: ICD-10-CM

## 2023-10-12 DIAGNOSIS — F33.41 RECURRENT MAJOR DEPRESSIVE DISORDER, IN PARTIAL REMISSION (HCC): ICD-10-CM

## 2023-10-12 DIAGNOSIS — E55.9 VITAMIN D DEFICIENCY, UNSPECIFIED: ICD-10-CM

## 2023-10-12 DIAGNOSIS — E66.1 CLASS 1 DRUG-INDUCED OBESITY WITHOUT SERIOUS COMORBIDITY WITH BODY MASS INDEX (BMI) OF 32.0 TO 32.9 IN ADULT: ICD-10-CM

## 2023-10-12 DIAGNOSIS — N93.9 ABNORMAL UTERINE BLEEDING (AUB): ICD-10-CM

## 2023-10-12 DIAGNOSIS — N95.1 HOT FLASH, MENOPAUSAL: Primary | ICD-10-CM

## 2023-10-12 PROCEDURE — 99214 OFFICE O/P EST MOD 30 MIN: CPT | Performed by: FAMILY MEDICINE

## 2023-10-12 RX ORDER — BUPROPION HYDROCHLORIDE 150 MG/1
150 TABLET ORAL EVERY MORNING
Qty: 90 TABLET | Refills: 2 | Status: SHIPPED | OUTPATIENT
Start: 2023-10-12

## 2023-10-12 NOTE — PROGRESS NOTES
1. \"Have you been to the ER, urgent care clinic since your last visit? Hospitalized since your last visit? \" No    2. \"Have you seen or consulted any other health care providers outside of the [address] [place] [address] since your last visit? \" No     3. For patients aged 43-73: Has the patient had a colonoscopy / FIT/ Cologuard? Yes - no Care Gap present      If the patient is female:    4. For patients aged 43-66: Has the patient had a mammogram within the past 2 years? Yes - no Care Gap present      5. For patients aged 21-65: Has the patient had a pap smear?  Yes - no Care Gap present
menopausal  Improved. Pt will continue with her diet modifications. Primary insomnia  Stable. Pt will continue current regimen. Vitamin D deficiency, unspecified  Pt continues with OTC Vitamin D supplement for the vitamin D deficiency. Recurrent major depressive disorder, in partial remission ([address])  The depression is well controlled on the current medication. Pt will continue current regimen. I refilled her Wellbutrin. -     buPROPion (WELLBUTRIN XL) 150 MG extended release tablet; Take 1 tablet by mouth every morning    Class 1 drug-induced obesity without serious comorbidity with body mass index (BMI) of 32.0 to 32.9 in adult  I encouraged pt to continue to work on improving her diet and get regular exercise. We discussed doing some resistance training. I also offered to refer her to a nutritionist for consultation on her diet. She is interested in this and I referred her to Pablo Metzger. I will also recheck her A1c and CMP.   -     [facility] - Pablo Metzger, RDN, MRM OP Nutrition, Nutrition Services, [place]  -     Hemoglobin A1C; Future  -     Comprehensive Metabolic Panel; Future    Acquired hypothyroidism  Followed by endocrinologist, Dr. Yesenia Newman. Pt will continue current regimen. Will recheck lipid panel.   -     Lipid Panel; Future    Abnormal uterine bleeding (AUB)  Pt has Mirena IUD device. She will follow up with her GYN regarding the spotting. I will recheck her CBC.   -     CBC with Auto Differential; Future      I have reviewed the patient's medications and risks/side effects/benefits were discussed. Diagnosis(-es) explained to patient and questions answered. Literature provided where appropriate.      Written by Giovanna Peralta, as dictated by Antoine Shaw MD.

## 2023-10-13 DIAGNOSIS — E03.8 HYPOTHYROIDISM DUE TO HASHIMOTO'S THYROIDITIS: ICD-10-CM

## 2023-10-13 DIAGNOSIS — E55.9 VITAMIN D DEFICIENCY: ICD-10-CM

## 2023-10-13 DIAGNOSIS — E06.3 HYPOTHYROIDISM DUE TO HASHIMOTO'S THYROIDITIS: ICD-10-CM

## 2023-10-13 DIAGNOSIS — E03.9 ACQUIRED HYPOTHYROIDISM: ICD-10-CM

## 2023-10-13 DIAGNOSIS — N93.9 ABNORMAL UTERINE BLEEDING (AUB): ICD-10-CM

## 2023-10-13 DIAGNOSIS — E66.1 CLASS 1 DRUG-INDUCED OBESITY WITHOUT SERIOUS COMORBIDITY WITH BODY MASS INDEX (BMI) OF 32.0 TO 32.9 IN ADULT: ICD-10-CM

## 2023-10-13 LAB
ALBUMIN SERPL-MCNC: 3.7 G/DL (ref 3.5–5)
ALBUMIN/GLOB SERPL: 1.2 (ref 1.1–2.2)
ALP SERPL-CCNC: 77 U/L (ref 45–117)
ALT SERPL-CCNC: 22 U/L (ref 12–78)
ANION GAP SERPL CALC-SCNC: 4 MMOL/L (ref 5–15)
AST SERPL-CCNC: 18 U/L (ref 15–37)
BASOPHILS # BLD: 0.1 K/UL (ref 0–0.1)
BASOPHILS NFR BLD: 1 % (ref 0–1)
BILIRUB SERPL-MCNC: 0.4 MG/DL (ref 0.2–1)
BUN SERPL-MCNC: 10 MG/DL (ref 6–20)
BUN/CREAT SERPL: 12 (ref 12–20)
CALCIUM SERPL-MCNC: 9.1 MG/DL (ref 8.5–10.1)
CHLORIDE SERPL-SCNC: 108 MMOL/L (ref 97–108)
CHOLEST SERPL-MCNC: 155 MG/DL
CO2 SERPL-SCNC: 28 MMOL/L (ref 21–32)
CREAT SERPL-MCNC: 0.85 MG/DL (ref 0.55–1.02)
DIFFERENTIAL METHOD BLD: NORMAL
EOSINOPHIL # BLD: 0.1 K/UL (ref 0–0.4)
EOSINOPHIL NFR BLD: 3 % (ref 0–7)
ERYTHROCYTE [DISTWIDTH] IN BLOOD BY AUTOMATED COUNT: 12.9 % (ref 11.5–14.5)
GLOBULIN SER CALC-MCNC: 3.2 G/DL (ref 2–4)
GLUCOSE SERPL-MCNC: 94 MG/DL (ref 65–100)
HCT VFR BLD AUTO: 41.1 % (ref 35–47)
HDLC SERPL-MCNC: 71 MG/DL
HDLC SERPL: 2.2 (ref 0–5)
HGB BLD-MCNC: 13.2 G/DL (ref 11.5–16)
IMM GRANULOCYTES # BLD AUTO: 0 K/UL (ref 0–0.04)
IMM GRANULOCYTES NFR BLD AUTO: 0 % (ref 0–0.5)
LDLC SERPL CALC-MCNC: 69 MG/DL (ref 0–100)
LYMPHOCYTES # BLD: 1.4 K/UL (ref 0.8–3.5)
LYMPHOCYTES NFR BLD: 27 % (ref 12–49)
MCH RBC QN AUTO: 28.1 PG (ref 26–34)
MCHC RBC AUTO-ENTMCNC: 32.1 G/DL (ref 30–36.5)
MCV RBC AUTO: 87.4 FL (ref 80–99)
MONOCYTES # BLD: 0.4 K/UL (ref 0–1)
MONOCYTES NFR BLD: 8 % (ref 5–13)
NEUTS SEG # BLD: 3 K/UL (ref 1.8–8)
NEUTS SEG NFR BLD: 61 % (ref 32–75)
NRBC # BLD: 0 K/UL (ref 0–0.01)
NRBC BLD-RTO: 0 PER 100 WBC
PLATELET # BLD AUTO: 266 K/UL (ref 150–400)
PMV BLD AUTO: 10.8 FL (ref 8.9–12.9)
POTASSIUM SERPL-SCNC: 4.8 MMOL/L (ref 3.5–5.1)
PROT SERPL-MCNC: 6.9 G/DL (ref 6.4–8.2)
RBC # BLD AUTO: 4.7 M/UL (ref 3.8–5.2)
SODIUM SERPL-SCNC: 140 MMOL/L (ref 136–145)
TRIGL SERPL-MCNC: 75 MG/DL
VLDLC SERPL CALC-MCNC: 15 MG/DL
WBC # BLD AUTO: 4.9 K/UL (ref 3.6–11)

## 2023-10-14 LAB
25(OH)D3 SERPL-MCNC: 72.4 NG/ML (ref 30–100)
EST. AVERAGE GLUCOSE BLD GHB EST-MCNC: 114 MG/DL
HBA1C MFR BLD: 5.6 % (ref 4–5.6)

## 2023-10-16 ENCOUNTER — CLINICAL DOCUMENTATION (OUTPATIENT)
Age: 53
End: 2023-10-16

## 2023-10-16 NOTE — PROGRESS NOTES
Received signed release of information document requesting records from [facility]  Faxed document to their office to obtain records and scanned in a copy of signed BHASKAR

## 2023-10-20 ENCOUNTER — OFFICE VISIT (OUTPATIENT)
Age: 53
End: 2023-10-20
Payer: COMMERCIAL

## 2023-10-20 VITALS
HEIGHT: 65 IN | BODY MASS INDEX: 32.46 KG/M2 | DIASTOLIC BLOOD PRESSURE: 81 MMHG | WEIGHT: 194.8 LBS | SYSTOLIC BLOOD PRESSURE: 120 MMHG | HEART RATE: 57 BPM

## 2023-10-20 DIAGNOSIS — E55.9 VITAMIN D DEFICIENCY, UNSPECIFIED: ICD-10-CM

## 2023-10-20 DIAGNOSIS — E06.3 HASHIMOTO'S DISEASE: Primary | ICD-10-CM

## 2023-10-20 PROCEDURE — 99214 OFFICE O/P EST MOD 30 MIN: CPT | Performed by: INTERNAL MEDICINE

## 2023-10-20 NOTE — PATIENT INSTRUCTIONS
1)  I will send you a message through [employer] with your lab results. 2) Keep taking levothyroxine 6 tabs/week + cytomel 7 tabs/week until you hear back from me. 3) Your vitamin D was normal at 75 but higher than I need it to be. It's fine to take 2 vitamin D caps in the winter but I would decrease to 1 cap daily starting in April when it gets warmer.

## 2023-10-20 NOTE — PROGRESS NOTES
Chief Complaint   Patient presents with    Thyroid Problem     PCP and pharmacy confirmed      History of Present Illness: Nilesh Dodd is a 47 y.o. female here for follow up of thyroid. Weight up 8 lbs since last visit in 4/23. Has been taking levothyroxine 88 mcg 6 tabs/week + cytomel 5 mcg daily and has felt better on this combination with improvement in her migraines. Did get prescribed progesterone in July or August by a functional med doctor but had more migraines with this and gained 5 lbs so she stopped this. Hasn't missed any doses. Bowels are sometimes loose and trying to eat more veggies that may be contributing to this. Can get a hot flash around the time of her period but is just spotting at this time. Still has IUD in place. Still taking vitamin D 2000 units daily.       Current Outpatient Medications   Medication Sig    buPROPion (WELLBUTRIN XL) 150 MG extended release tablet Take 1 tablet by mouth every morning    levothyroxine (SYNTHROID) 88 MCG tablet TAKE 1 TAB ON MON-SAT & NONE ON SUN--DELETE 75 MCG    tiZANidine (ZANAFLEX) 4 MG tablet Take 1 tablet by mouth nightly    Cholecalciferol (VITAMIN D3) 50 MCG (2000 UT) CAPS Take 1,000 Units by mouth daily    rizatriptan (MAXALT) 10 MG tablet Take 1 tablet by mouth once as needed for Migraine    Rimegepant Sulfate (NURTEC) 75 MG TBDP Take 75 mg by mouth every other day (Patient taking differently: Take 75 mg by mouth as needed)    liothyronine (CYTOMEL) 5 MCG tablet Take 1 tablet by mouth daily    butalbital-acetaminophen-caffeine (FIORICET, ESGIC) -40 MG per tablet Take 1 tablet by mouth every 6 hours as needed    diphenhydrAMINE (SOMINEX) 25 MG tablet Take 1 tablet by mouth as needed    hydrocortisone 2.5 % ointment ceived the following from [facility]: Outside name: hydrocortisone (HYTONE) 2.5 % ointment    ketorolac (TORADOL) 10 MG tablet Take 1 tablet by mouth every 8 hours as needed    levonorgestrel (MIRENA, 52

## 2023-11-13 NOTE — TELEPHONE ENCOUNTER
Informed pt Dr. Jami Nelson has sent a [employer] message that you have not yet read. Please read this as soon as possible. Explained to pt that the [employer] alex may need to be uninstalled then reinstalled due to the system upgrade. Offered pt [employer] number but she stated she will try to uninstall and reinstall and if that doesn't work she'll call office for [employer] number. Mercedes Flap Text: The defect edges were debeveled with a #15 scalpel blade.  Given the location of the defect, shape of the defect and the proximity to free margins a Mercedes flap was deemed most appropriate.  Using a sterile surgical marker, an appropriate advancement flap was drawn incorporating the defect and placing the expected incisions within the relaxed skin tension lines where possible. The area thus outlined was incised deep to adipose tissue with a #15 scalpel blade.  The skin margins were undermined to an appropriate distance in all directions utilizing iris scissors.

## 2024-01-10 RX ORDER — TIZANIDINE 4 MG/1
4 TABLET ORAL NIGHTLY
Qty: 30 TABLET | Refills: 5 | Status: SHIPPED | OUTPATIENT
Start: 2024-01-10

## 2024-01-10 NOTE — TELEPHONE ENCOUNTER
Last OV was 6/15/23  advised to follow up in one year.  Last filled on 7/14/23 # 30 with 5 refills

## 2024-01-13 RX ORDER — LIOTHYRONINE SODIUM 5 UG/1
5 TABLET ORAL DAILY
Qty: 90 TABLET | Refills: 3 | Status: SHIPPED | OUTPATIENT
Start: 2024-01-13

## 2024-04-12 DIAGNOSIS — E55.9 VITAMIN D DEFICIENCY, UNSPECIFIED: ICD-10-CM

## 2024-04-12 DIAGNOSIS — E06.3 HASHIMOTO'S DISEASE: ICD-10-CM

## 2024-04-12 LAB
25(OH)D3 SERPL-MCNC: 63 NG/ML (ref 30–100)
ANION GAP SERPL CALC-SCNC: 4 MMOL/L (ref 5–15)
BUN SERPL-MCNC: 14 MG/DL (ref 6–20)
BUN/CREAT SERPL: 16 (ref 12–20)
CALCIUM SERPL-MCNC: 9.4 MG/DL (ref 8.5–10.1)
CHLORIDE SERPL-SCNC: 111 MMOL/L (ref 97–108)
CO2 SERPL-SCNC: 25 MMOL/L (ref 21–32)
CREAT SERPL-MCNC: 0.9 MG/DL (ref 0.55–1.02)
GLUCOSE SERPL-MCNC: 100 MG/DL (ref 65–100)
POTASSIUM SERPL-SCNC: 4.6 MMOL/L (ref 3.5–5.1)
SODIUM SERPL-SCNC: 140 MMOL/L (ref 136–145)
T4 FREE SERPL-MCNC: 1.1 NG/DL (ref 0.8–1.5)
TSH SERPL DL<=0.05 MIU/L-ACNC: 1.28 UIU/ML (ref 0.36–3.74)

## 2024-04-15 LAB — T3 SERPL-MCNC: 175 NG/DL (ref 71–180)

## 2024-04-30 ENCOUNTER — OFFICE VISIT (OUTPATIENT)
Age: 54
End: 2024-04-30
Payer: COMMERCIAL

## 2024-04-30 VITALS
HEART RATE: 57 BPM | DIASTOLIC BLOOD PRESSURE: 84 MMHG | SYSTOLIC BLOOD PRESSURE: 135 MMHG | WEIGHT: 188.6 LBS | BODY MASS INDEX: 31.42 KG/M2 | HEIGHT: 65 IN

## 2024-04-30 DIAGNOSIS — E55.9 VITAMIN D DEFICIENCY, UNSPECIFIED: ICD-10-CM

## 2024-04-30 DIAGNOSIS — E06.3 HASHIMOTO'S DISEASE: Primary | ICD-10-CM

## 2024-04-30 PROCEDURE — 99214 OFFICE O/P EST MOD 30 MIN: CPT | Performed by: INTERNAL MEDICINE

## 2024-04-30 RX ORDER — LEVOTHYROXINE SODIUM 88 UG/1
TABLET ORAL
Qty: 90 TABLET | Refills: 3 | Status: SHIPPED | OUTPATIENT
Start: 2024-04-30

## 2024-04-30 RX ORDER — RIMEGEPANT SULFATE 75 MG/75MG
75 TABLET, ORALLY DISINTEGRATING ORAL EVERY OTHER DAY
Qty: 16 TABLET | Refills: 5
Start: 2024-04-30

## 2024-04-30 NOTE — PATIENT INSTRUCTIONS
1) TSH is a thyroid test.  Your level is 1.28 which is normal but just above my goal of 0.45-1.0.  The TSH goes opposite of your thyroid dose and suggests your dose of levothyroxine is not quite enough.  I will increase your dose to 1 tab 7 days a week and have sent a new prescription to your local pharmacy.  Keep taking liothyronine 5 mcg 1 tab 7 days a week.    2) BUN and creatinine are markers of kidney function.  Your values are normal.    3) Your electrolytes (sodium, potassium, and calcium) are all normal.  Your glucose (blood sugar) is normal.    4) Your vitamin D is normal.    5) Plan on repeating your labs in 6-8 weeks at the [facility] lab next to my office in [facility] [address].   I will send you a message through [employer] with your lab results.

## 2024-04-30 NOTE — PROGRESS NOTES
Chief Complaint   Patient presents with    Thyroid Problem     PCP and pharmacy confirmed     History of Present Illness: Alecia Munoz is a 53 y.o. female here for follow up of thyroid.  Weight down 6 lbs since last visit in 10/23.  Compliant with levothyroxine 88 mcg 6 days a week and cytomel 5 mcg daily and if she misses a dose, will make up for it which has only happened twice in the past 6 months.  Takes with just water at least 30 min before food and coffee usually in the middle of the night.  Has had some migraines but not as intense as in the past.  Bowels are regular.  No heat or cold intolerance but at times can feel hotter.  No change in hair, skin, or nails aside from a toenail fungus.  Overall energy is a little worse.      Current Outpatient Medications   Medication Sig    rimegepant sulfate (NURTEC) 75 MG TBDP Take 75 mg by mouth every other day put back on list 04/30/24    liothyronine (CYTOMEL) 5 MCG tablet Take 1 tablet by mouth daily    tiZANidine (ZANAFLEX) 4 MG tablet Take 1 tablet by mouth nightly    buPROPion (WELLBUTRIN XL) 150 MG extended release tablet Take 1 tablet by mouth every morning    levothyroxine (SYNTHROID) 88 MCG tablet TAKE 1 TAB ON MON-SAT & NONE ON SUN--DELETE 75 MCG    Cholecalciferol (VITAMIN D3) 50 MCG (2000 UT) CAPS Take 1,000 Units by mouth daily    rizatriptan (MAXALT) 10 MG tablet Take 1 tablet by mouth once as needed for Migraine    butalbital-acetaminophen-caffeine (FIORICET, ESGIC) -40 MG per tablet Take 1 tablet by mouth every 6 hours as needed    diphenhydrAMINE (SOMINEX) 25 MG tablet Take 1 tablet by mouth as needed    hydrocortisone 2.5 % ointment ceived the following from [facility]: Outside name: hydrocortisone (HYTONE) 2.5 % ointment    ketorolac (TORADOL) 10 MG tablet Take 1 tablet by mouth every 8 hours as needed    levonorgestrel (MIRENA, 52 MG,) IUD 52 mg Mirena 20 mcg/24 hours (7 yrs) 52 mg intrauterine device   Take 1 device by

## 2024-06-17 ENCOUNTER — TELEPHONE (OUTPATIENT)
Age: 54
End: 2024-06-17

## 2024-06-17 ENCOUNTER — OFFICE VISIT (OUTPATIENT)
Age: 54
End: 2024-06-17
Payer: COMMERCIAL

## 2024-06-17 VITALS
DIASTOLIC BLOOD PRESSURE: 78 MMHG | TEMPERATURE: 98.1 F | HEART RATE: 62 BPM | OXYGEN SATURATION: 95 % | HEIGHT: 65 IN | BODY MASS INDEX: 32.06 KG/M2 | SYSTOLIC BLOOD PRESSURE: 124 MMHG | RESPIRATION RATE: 16 BRPM | WEIGHT: 192.4 LBS

## 2024-06-17 DIAGNOSIS — G43.109 MIGRAINE WITH AURA AND WITHOUT STATUS MIGRAINOSUS, NOT INTRACTABLE: Primary | ICD-10-CM

## 2024-06-17 DIAGNOSIS — I67.89 CEREBRAL MICROVASCULAR DISEASE: ICD-10-CM

## 2024-06-17 DIAGNOSIS — I65.23 BILATERAL CAROTID ARTERY STENOSIS: ICD-10-CM

## 2024-06-17 DIAGNOSIS — G43.709 CHRONIC MIGRAINE W/O AURA W/O STATUS MIGRAINOSUS, NOT INTRACTABLE: ICD-10-CM

## 2024-06-17 DIAGNOSIS — E06.3 HASHIMOTO'S DISEASE: ICD-10-CM

## 2024-06-17 PROCEDURE — 99214 OFFICE O/P EST MOD 30 MIN: CPT | Performed by: PSYCHIATRY & NEUROLOGY

## 2024-06-17 RX ORDER — CYCLOBENZAPRINE HCL 10 MG
10 TABLET ORAL 3 TIMES DAILY PRN
COMMUNITY
Start: 2024-06-10

## 2024-06-17 RX ORDER — TIZANIDINE 4 MG/1
4 TABLET ORAL NIGHTLY
Qty: 90 TABLET | Refills: 3 | Status: SHIPPED | OUTPATIENT
Start: 2024-06-17

## 2024-06-17 RX ORDER — RIMEGEPANT SULFATE 75 MG/75MG
75 TABLET, ORALLY DISINTEGRATING ORAL EVERY OTHER DAY
Qty: 16 TABLET | Refills: 5 | Status: SHIPPED | OUTPATIENT
Start: 2024-06-17

## 2024-06-17 ASSESSMENT — PATIENT HEALTH QUESTIONNAIRE - PHQ9
SUM OF ALL RESPONSES TO PHQ QUESTIONS 1-9: 2
SUM OF ALL RESPONSES TO PHQ QUESTIONS 1-9: 2
1. LITTLE INTEREST OR PLEASURE IN DOING THINGS: SEVERAL DAYS
2. FEELING DOWN, DEPRESSED OR HOPELESS: SEVERAL DAYS
SUM OF ALL RESPONSES TO PHQ QUESTIONS 1-9: 2
SUM OF ALL RESPONSES TO PHQ9 QUESTIONS 1 & 2: 2
SUM OF ALL RESPONSES TO PHQ QUESTIONS 1-9: 2

## 2024-06-17 NOTE — TELEPHONE ENCOUNTER
Nurtec auto approved    Your PA request has been approved. Additional information will be provided in the approval communication. (Message 3214)   Approval Details    Authorized from June 17, 2024 to June 17, 2025      Electronic appeal: Not supported

## 2024-06-17 NOTE — PROGRESS NOTES
Consult  REFERRED BY:  Shy Srinivasan MD    CHIEF COMPLAINT: Persistent migraine headaches seen as a new patient to me, and for cerebral microvascular disease on her MRI scan      Subjective:     Alecia Munoz is a 53 y.o. right-handed -American female we are seeing as a new patient to me, at the request of Dr. Srinivasan for evaluation of persistent headaches with a migraine type, for which she is taking Nurtec as needed for the headaches, and has been taking Maxalt but no longer uses that but does use Fioricet occasionally for the headaches also.  She gets about 1-2 headaches per month normally, occasionally more.  She does not think she needs more preventive therapy and does not take the Nurtec on an every other day basis, just as needed.  On her MRI scan done 2021 she had some prominent microvascular disease which could be related to the headaches, she is never had a carotid Doppler study done so we will check back just to make sure there is no vascular insufficiency as a cause of her symptoms, and is probably best that she does not think that Maxalt because of that.  We will continue the Nurtec.  That is working for her and she has no side effects.  She otherwise is doing well and has no other major medical problems but is being treated by Dr. Faust for Hashimoto's thyroiditis and she does have some mild depression currently being treated with Wellbutrin.  She has had no other new focal weakness, no intractable headaches, no sensory loss, no visual problems, no cranial nerve problems, and overall is doing well.    Past Medical History:   Diagnosis Date    Hashimoto's disease     Migraine 2018    TMJ (dislocation of temporomandibular joint)     Vitamin D deficiency       Past Surgical History:   Procedure Laterality Date    ORTHOPEDIC SURGERY  2003    ganglion cyst removal from right wrist     Family History   Problem Relation Age of Onset    Glaucoma Mother     Other Mother         shingles    Cancer

## 2024-06-18 ENCOUNTER — TELEPHONE (OUTPATIENT)
Age: 54
End: 2024-06-18

## 2024-06-18 NOTE — TELEPHONE ENCOUNTER
Carotid Doppler  [facility] [address]  Order ID [id number]    Auth valid from 6-18-24 to 7-17-24    Uploaded to Media

## 2024-07-01 DIAGNOSIS — E06.3 HASHIMOTO'S DISEASE: ICD-10-CM

## 2024-07-01 LAB
T4 FREE SERPL-MCNC: 1.2 NG/DL (ref 0.8–1.5)
TSH SERPL DL<=0.05 MIU/L-ACNC: 1.51 UIU/ML (ref 0.36–3.74)

## 2024-07-02 LAB — T3 SERPL-MCNC: 122 NG/DL (ref 71–180)

## 2024-07-07 DIAGNOSIS — F33.41 RECURRENT MAJOR DEPRESSIVE DISORDER, IN PARTIAL REMISSION (HCC): ICD-10-CM

## 2024-07-08 RX ORDER — BUPROPION HYDROCHLORIDE 150 MG/1
150 TABLET ORAL EVERY MORNING
Qty: 90 TABLET | Refills: 2 | Status: SHIPPED | OUTPATIENT
Start: 2024-07-08

## 2024-07-08 RX ORDER — LEVOTHYROXINE SODIUM 88 UG/1
TABLET ORAL
Qty: 85 TABLET | Refills: 3 | OUTPATIENT
Start: 2024-07-08

## 2024-07-08 RX ORDER — LEVOTHYROXINE SODIUM 0.1 MG/1
TABLET ORAL
Qty: 90 TABLET | Refills: 3 | Status: SHIPPED | OUTPATIENT
Start: 2024-07-08

## 2024-08-09 ENCOUNTER — HOSPITAL ENCOUNTER (EMERGENCY)
Facility: HOSPITAL | Age: 54
Discharge: HOME | End: 2024-08-09
Payer: COMMERCIAL

## 2024-08-09 VITALS
HEIGHT: 65 IN | RESPIRATION RATE: 18 BRPM | TEMPERATURE: 98.6 F | SYSTOLIC BLOOD PRESSURE: 153 MMHG | OXYGEN SATURATION: 94 % | HEART RATE: 87 BPM | WEIGHT: 192 LBS | BODY MASS INDEX: 31.99 KG/M2 | DIASTOLIC BLOOD PRESSURE: 89 MMHG

## 2024-08-09 DIAGNOSIS — U07.1 COVID-19: Primary | ICD-10-CM

## 2024-08-09 LAB
ALBUMIN SERPL-MCNC: 3.7 G/DL (ref 3.5–5)
ALBUMIN/GLOB SERPL: 1.2 (ref 1.1–2.2)
ALP SERPL-CCNC: 70 U/L (ref 45–117)
ALT SERPL-CCNC: 23 U/L (ref 12–78)
ANION GAP SERPL CALC-SCNC: 6 MMOL/L (ref 5–15)
APPEARANCE UR: CLEAR
AST SERPL-CCNC: 14 U/L (ref 15–37)
BACTERIA URNS QL MICRO: ABNORMAL /HPF
BASOPHILS # BLD: 0 K/UL (ref 0–0.1)
BASOPHILS NFR BLD: 0 % (ref 0–1)
BILIRUB SERPL-MCNC: 0.7 MG/DL (ref 0.2–1)
BILIRUB UR QL: NEGATIVE
BUN SERPL-MCNC: 10 MG/DL (ref 6–20)
BUN/CREAT SERPL: 11 (ref 12–20)
CALCIUM SERPL-MCNC: 9.3 MG/DL (ref 8.5–10.1)
CHLORIDE SERPL-SCNC: 106 MMOL/L (ref 97–108)
CO2 SERPL-SCNC: 27 MMOL/L (ref 21–32)
COLOR UR: ABNORMAL
CREAT SERPL-MCNC: 0.88 MG/DL (ref 0.55–1.02)
DIFFERENTIAL METHOD BLD: ABNORMAL
EOSINOPHIL # BLD: 0 K/UL (ref 0–0.4)
EOSINOPHIL NFR BLD: 0 % (ref 0–7)
EPITH CASTS URNS QL MICRO: ABNORMAL /LPF
ERYTHROCYTE [DISTWIDTH] IN BLOOD BY AUTOMATED COUNT: 12.6 % (ref 11.5–14.5)
FLUAV RNA SPEC QL NAA+PROBE: NOT DETECTED
FLUBV RNA SPEC QL NAA+PROBE: NOT DETECTED
GLOBULIN SER CALC-MCNC: 3.2 G/DL (ref 2–4)
GLUCOSE SERPL-MCNC: 109 MG/DL (ref 65–100)
GLUCOSE UR STRIP.AUTO-MCNC: NEGATIVE MG/DL
HCT VFR BLD AUTO: 40.5 % (ref 35–47)
HGB BLD-MCNC: 13.8 G/DL (ref 11.5–16)
HGB UR QL STRIP: NEGATIVE
HYALINE CASTS URNS QL MICRO: ABNORMAL /LPF (ref 0–2)
IMM GRANULOCYTES # BLD AUTO: 0 K/UL (ref 0–0.04)
IMM GRANULOCYTES NFR BLD AUTO: 0 % (ref 0–0.5)
KETONES UR QL STRIP.AUTO: 40 MG/DL
LEUKOCYTE ESTERASE UR QL STRIP.AUTO: ABNORMAL
LIPASE SERPL-CCNC: 16 U/L (ref 13–75)
LYMPHOCYTES # BLD: 0.7 K/UL (ref 0.8–3.5)
LYMPHOCYTES NFR BLD: 9 % (ref 12–49)
MAGNESIUM SERPL-MCNC: 2.3 MG/DL (ref 1.6–2.4)
MCH RBC QN AUTO: 28.5 PG (ref 26–34)
MCHC RBC AUTO-ENTMCNC: 34.1 G/DL (ref 30–36.5)
MCV RBC AUTO: 83.5 FL (ref 80–99)
MONOCYTES # BLD: 0.7 K/UL (ref 0–1)
MONOCYTES NFR BLD: 9 % (ref 5–13)
NEUTS SEG # BLD: 6 K/UL (ref 1.8–8)
NEUTS SEG NFR BLD: 82 % (ref 32–75)
NITRITE UR QL STRIP.AUTO: NEGATIVE
NRBC # BLD: 0 K/UL (ref 0–0.01)
NRBC BLD-RTO: 0 PER 100 WBC
PH UR STRIP: 5.5 (ref 5–8)
PLATELET # BLD AUTO: 241 K/UL (ref 150–400)
PMV BLD AUTO: 9.9 FL (ref 8.9–12.9)
POTASSIUM SERPL-SCNC: 3.6 MMOL/L (ref 3.5–5.1)
PROT SERPL-MCNC: 6.9 G/DL (ref 6.4–8.2)
PROT UR STRIP-MCNC: 30 MG/DL
RBC # BLD AUTO: 4.85 M/UL (ref 3.8–5.2)
RBC #/AREA URNS HPF: ABNORMAL /HPF (ref 0–5)
RBC MORPH BLD: ABNORMAL
SARS-COV-2 RNA RESP QL NAA+PROBE: DETECTED
SODIUM SERPL-SCNC: 139 MMOL/L (ref 136–145)
SP GR UR REFRACTOMETRY: 1.03
TROPONIN I SERPL HS-MCNC: 19 NG/L (ref 0–51)
URINE CULTURE IF INDICATED: ABNORMAL
UROBILINOGEN UR QL STRIP.AUTO: 1 EU/DL (ref 0.2–1)
WBC # BLD AUTO: 7.4 K/UL (ref 3.6–11)
WBC URNS QL MICRO: ABNORMAL /HPF (ref 0–4)

## 2024-08-09 PROCEDURE — 80053 COMPREHEN METABOLIC PANEL: CPT

## 2024-08-09 PROCEDURE — 83735 ASSAY OF MAGNESIUM: CPT

## 2024-08-09 PROCEDURE — 36415 COLL VENOUS BLD VENIPUNCTURE: CPT

## 2024-08-09 PROCEDURE — 85025 COMPLETE CBC W/AUTO DIFF WBC: CPT

## 2024-08-09 PROCEDURE — 87086 URINE CULTURE/COLONY COUNT: CPT

## 2024-08-09 PROCEDURE — 84484 ASSAY OF TROPONIN QUANT: CPT

## 2024-08-09 PROCEDURE — 99284 EMERGENCY DEPT VISIT MOD MDM: CPT

## 2024-08-09 PROCEDURE — 2580000003 HC RX 258

## 2024-08-09 PROCEDURE — 83690 ASSAY OF LIPASE: CPT

## 2024-08-09 PROCEDURE — 87636 SARSCOV2 & INF A&B AMP PRB: CPT

## 2024-08-09 PROCEDURE — 96375 TX/PRO/DX INJ NEW DRUG ADDON: CPT

## 2024-08-09 PROCEDURE — 93005 ELECTROCARDIOGRAM TRACING: CPT

## 2024-08-09 PROCEDURE — 6360000002 HC RX W HCPCS

## 2024-08-09 PROCEDURE — 96361 HYDRATE IV INFUSION ADD-ON: CPT

## 2024-08-09 PROCEDURE — 81001 URINALYSIS AUTO W/SCOPE: CPT

## 2024-08-09 PROCEDURE — 96374 THER/PROPH/DIAG INJ IV PUSH: CPT

## 2024-08-09 RX ORDER — SODIUM CHLORIDE, SODIUM LACTATE, POTASSIUM CHLORIDE, AND CALCIUM CHLORIDE .6; .31; .03; .02 G/100ML; G/100ML; G/100ML; G/100ML
1000 INJECTION, SOLUTION INTRAVENOUS ONCE
Status: COMPLETED | OUTPATIENT
Start: 2024-08-09 | End: 2024-08-09

## 2024-08-09 RX ORDER — ONDANSETRON 4 MG/1
4 TABLET, FILM COATED ORAL 3 TIMES DAILY PRN
Qty: 15 TABLET | Refills: 0 | Status: SHIPPED | OUTPATIENT
Start: 2024-08-09

## 2024-08-09 RX ORDER — KETOROLAC TROMETHAMINE 30 MG/ML
15 INJECTION, SOLUTION INTRAMUSCULAR; INTRAVENOUS
Status: COMPLETED | OUTPATIENT
Start: 2024-08-09 | End: 2024-08-09

## 2024-08-09 RX ORDER — ONDANSETRON 2 MG/ML
4 INJECTION INTRAMUSCULAR; INTRAVENOUS
Status: COMPLETED | OUTPATIENT
Start: 2024-08-09 | End: 2024-08-09

## 2024-08-09 RX ADMIN — KETOROLAC TROMETHAMINE 15 MG: 30 INJECTION, SOLUTION INTRAMUSCULAR at 09:51

## 2024-08-09 RX ADMIN — ONDANSETRON 4 MG: 2 INJECTION INTRAMUSCULAR; INTRAVENOUS at 09:51

## 2024-08-09 RX ADMIN — SODIUM CHLORIDE, POTASSIUM CHLORIDE, SODIUM LACTATE AND CALCIUM CHLORIDE 1000 ML: 600; 310; 30; 20 INJECTION, SOLUTION INTRAVENOUS at 09:49

## 2024-08-09 ASSESSMENT — PAIN - FUNCTIONAL ASSESSMENT: PAIN_FUNCTIONAL_ASSESSMENT: 0-10

## 2024-08-09 ASSESSMENT — PAIN DESCRIPTION - LOCATION: LOCATION: ABDOMEN

## 2024-08-09 ASSESSMENT — PAIN SCALES - GENERAL: PAINLEVEL_OUTOF10: 6

## 2024-08-09 NOTE — DISCHARGE INSTRUCTIONS
Thank You!    It was a pleasure taking care of you in our Emergency Department today. We know that when you come to our Emergency Department, you are entrusting us with your health, comfort, and safety. Our physicians and nurses honor that trust, and truly appreciate the opportunity to care for you and your loved ones.      We also value your feedback. If you receive a survey about your Emergency Department experience today, please fill it out.  We care about our patients' feedback, and we listen to what you have to say.  Thank you.    Stan Caceres MD  ________________________________________________________________________  I have included a copy of your lab results and/or radiologic studies from today's visit so you can have them easily available at your follow-up visit. We hope you feel better and please do not hesitate to contact the ED if you have any questions at all!    Recent Results (from the past 12 hour(s))   CBC with Auto Differential    Collection Time: 08/09/24  9:31 AM   Result Value Ref Range    WBC 7.4 3.6 - 11.0 K/uL    RBC 4.85 3.80 - 5.20 M/uL    Hemoglobin 13.8 11.5 - 16.0 g/dL    Hematocrit 40.5 35.0 - 47.0 %    MCV 83.5 80.0 - 99.0 FL    MCH 28.5 26.0 - 34.0 PG    MCHC 34.1 30.0 - 36.5 g/dL    RDW 12.6 11.5 - 14.5 %    Platelets 241 150 - 400 K/uL    MPV 9.9 8.9 - 12.9 FL    Nucleated RBCs 0.0 0  WBC    nRBC 0.00 0.00 - 0.01 K/uL    Neutrophils % 82 (H) 32 - 75 %    Lymphocytes % 9 (L) 12 - 49 %    Monocytes % 9 5 - 13 %    Eosinophils % 0 0 - 7 %    Basophils % 0 0 - 1 %    Immature Granulocytes % 0 0.0 - 0.5 %    Neutrophils Absolute 6.0 1.8 - 8.0 K/UL    Lymphocytes Absolute 0.7 (L) 0.8 - 3.5 K/UL    Monocytes Absolute 0.7 0.0 - 1.0 K/UL    Eosinophils Absolute 0.0 0.0 - 0.4 K/UL    Basophils Absolute 0.0 0.0 - 0.1 K/UL    Immature Granulocytes Absolute 0.0 0.00 - 0.04 K/UL    Differential Type SMEAR SCANNED      RBC Comment MICROCYTOSIS  1+       Comprehensive Metabolic Panel  changes or other symptoms that concern you, return to the ED sooner. If you feel worse over the next 24 hours, please return to the ED. We are available 24 hours a day. Thank you for trusting us with your care!

## 2024-08-09 NOTE — ED PROVIDER NOTES
[facility] EMERGENCY DEPT  EMERGENCY DEPARTMENT ENCOUNTER    Patient Name: Alecia Munoz  MRN: [id number]  YOB: 1970  Provider: Stan Caceres MD  PCP: Shy Srinivasan MD    Time/Date of evaluation: 9:46 AM EDT on 8/9/24    History of Presenting Illness     Chief Complaint   Patient presents with    Abdominal Pain     Patient came in due to abdominal pain, ( pain 6/10)   Not eating/ drinking very well at home, urinary symptoms also   Diagnosed covid using home test      History Provided by: Patient   History is limited by: Nothing    HISTORY (Narrative):   Alecia Munoz is a 53 y.o. female with recent diagnosis of COVID presenting to the ER due to generalized abdominal discomfort, notes that she has had some urinary symptoms and was concerned she had a urinary tract infection, reports nausea and generalized fatigue with muscle aches as well as cough.  Patient denies headache, vision changes, chest pain, shortness of breath, diarrhea, constipation, or any other associated symptoms.      Nursing Notes were all reviewed and agreed with or any disagreements were addressed in the HPI.    Past History     PAST MEDICAL HISTORY:  Past Medical History:   Diagnosis Date    Hashimoto's disease     Migraine 2018    TMJ (dislocation of temporomandibular joint)     Vitamin D deficiency        PAST SURGICAL HISTORY:  Past Surgical History:   Procedure Laterality Date    ORTHOPEDIC SURGERY  2003    ganglion cyst removal from right wrist       FAMILY HISTORY:  Family History   Problem Relation Age of Onset    Glaucoma Mother     Other Mother         shingles    Cancer Father         was never diagnosed    Other Father         quadriplegic from motor vehicle accident    Hypertension Brother     Stroke Paternal Grandmother     Alzheimer's Disease Paternal Grandmother     Cancer Paternal Grandfather         lung    Cancer Paternal Uncle         pancreatic    Thyroid Disease Neg Hx     Hypertension Mother        SOCIAL  [address] [id number] [phone]    In 1 week      [facility] EMERGENCY DEPT  [address] [address]  [phone]    If symptoms worsen      DISCHARGE MEDICATIONS:       Medication List        START taking these medications      ondansetron 4 MG tablet  Commonly known as: ZOFRAN  Take 1 tablet by mouth 3 times daily as needed for Nausea or Vomiting            ASK your doctor about these medications      buPROPion 150 MG extended release tablet  Commonly known as: WELLBUTRIN XL  TAKE 1 TABLET BY MOUTH EVERY DAY IN THE MORNING     butalbital-acetaminophen-caffeine -40 MG per tablet  Commonly known as: FIORICET, ESGIC     cyclobenzaprine 10 MG tablet  Commonly known as: FLEXERIL     diphenhydrAMINE 25 MG tablet  Commonly known as: SOMINEX     estradiol 0.05 MG/24HR  Commonly known as: VIVELLE     hydrocortisone 2.5 % ointment     ketorolac 10 MG tablet  Commonly known as: TORADOL     levothyroxine 100 MCG tablet  Commonly known as: SYNTHROID  TAKE 1 TAB DAILY--Delete 88 mcg dose from profile     liothyronine 5 MCG tablet  Commonly known as: Cytomel  Take 1 tablet by mouth daily     Mirena (52 MG) IUD 52 mg  Generic drug: levonorgestrel     mupirocin 2 % ointment  Commonly known as: BACTROBAN     Nurtec 75 MG Tbdp  Generic drug: rimegepant sulfate  Take 75 mg by mouth every other day put back on list 04/30/24     ondansetron 4 MG disintegrating tablet  Commonly known as: ZOFRAN-ODT     rizatriptan 10 MG tablet  Commonly known as: MAXALT  Take 1 tablet by mouth once as needed for Migraine     tiZANidine 4 MG tablet  Commonly known as: ZANAFLEX  Take 1 tablet by mouth nightly     Vitamin D3 50 MCG (2000 UT) Caps               Where to Get Your Medications        These medications were sent to [facility]/pharmacy #[id number] - [place], [place] - [address] - P [phone] - F [phone] [address] [address]      Phone: [phone]   ondansetron 4 MG tablet

## 2024-08-09 NOTE — ED NOTES
Received patient from EMS   Came in due to abdominal pain and positive covid  No shortness of breath or chest pain   Not in distress , hooked to cardiac monitor   IV cannula inserted laboratory works done   Fluids given, medication started.   Lying comfortably in bed.   All needs attended

## 2024-08-09 NOTE — ED NOTES
Pt discharged by [facility]. Discharge instructions discussed and pt given opportunity to ask questions. Pt ambulatory out of ED

## 2024-08-10 LAB
BACTERIA SPEC CULT: NORMAL
CC UR VC: NORMAL
EKG ATRIAL RATE: 58 BPM
EKG DIAGNOSIS: NORMAL
EKG P AXIS: 48 DEGREES
EKG P-R INTERVAL: 168 MS
EKG Q-T INTERVAL: 432 MS
EKG QRS DURATION: 94 MS
EKG QTC CALCULATION (BAZETT): 424 MS
EKG R AXIS: -6 DEGREES
EKG T AXIS: -17 DEGREES
EKG VENTRICULAR RATE: 58 BPM
SERVICE CMNT-IMP: NORMAL

## 2024-10-17 DIAGNOSIS — E06.3 HASHIMOTO'S DISEASE: ICD-10-CM

## 2024-10-17 LAB
T4 FREE SERPL-MCNC: 1.2 NG/DL (ref 0.8–1.5)
TSH SERPL DL<=0.05 MIU/L-ACNC: 0.03 UIU/ML (ref 0.36–3.74)

## 2024-10-18 LAB — T3 SERPL-MCNC: 185 NG/DL (ref 71–180)

## 2024-10-23 NOTE — DISCHARGE INSTRUCTIONS
[facility]  [address] [address]  Dept: [phone]    Date of Service:  10/23/2024    Marcel Sanchez is a 17 y.o. male who presents in office today with Self and Parent.    Chief Complaint   Patient presents with    Mass     Pt is here for lump behind his neck, in the middle of his cervical spine. Pt states he found it last week. Pt states it has grown.         Diagnoses / Plan:   1. Lipoma of neck  Palpable lipoma, if it does continue to grow or change in size shape, please let office know right away.    .  Encouraged symptomatic treatment, rest, increase oral fluid intake.  Follow-up for worsening or persistent symptoms.  Patient verbalizes understanding regarding plan of care and all questions answered.    No follow-ups on file.     Subjective:   History of Present Illness:  Marcel is here today with his mother for a lump on the back of his neck.  She is concerned it is some sort of cyst or mass.  They noticed it approximately 2 or 3 weeks ago and think it might be growing in size.  It is nonpainful, mobile, nonerythemic.  No drainage, no change other than they believe it has grown some.      Current Outpatient Medications   Medication Sig Dispense Refill    escitalopram (LEXAPRO) 10 MG tablet Take 1 tablet by mouth daily 30 tablet 3    DULoxetine HCl 40 MG CPEP Take 1 capsule by mouth daily 90 capsule 0     No current facility-administered medications for this visit.       Review of Systems   Constitutional:  Negative for chills, fever and unexpected weight change.   Respiratory:  Negative for cough and shortness of breath.    Gastrointestinal:  Negative for abdominal pain.   Skin:  Negative for color change.              7/31/2024     1:21 PM 6/7/2023    12:28 PM   PHQ Scores   PHQ2 Score 2 0   PHQ9 Score 9 0     Interpretation of Total Score Depression Severity: 1-4 = Minimal depression, 5-9 = Mild depression, 10-14 = Moderate depression, 15-19 = Moderately severe  Abdominal Pain: Care Instructions  Your Care Instructions    Abdominal pain has many possible causes. Some aren't serious and get better on their own in a few days. Others need more testing and treatment. If your pain continues or gets worse, you need to be rechecked and may need more tests to find out what is wrong. You may need surgery to correct the problem. Don't ignore new symptoms, such as fever, nausea and vomiting, urination problems, pain that gets worse, and dizziness. These may be signs of a more serious problem. Your doctor may have recommended a follow-up visit in the next 8 to 12 hours. If you are not getting better, you may need more tests or treatment. The doctor has checked you carefully, but problems can develop later. If you notice any problems or new symptoms, get medical treatment right away. Follow-up care is a key part of your treatment and safety. Be sure to make and go to all appointments, and call your doctor if you are having problems. It's also a good idea to know your test results and keep a list of the medicines you take. How can you care for yourself at home? · Rest until you feel better. · To prevent dehydration, drink plenty of fluids, enough so that your urine is light yellow or clear like water. Choose water and other caffeine-free clear liquids until you feel better. If you have kidney, heart, or liver disease and have to limit fluids, talk with your doctor before you increase the amount of fluids you drink. · If your stomach is upset, eat mild foods, such as rice, dry toast or crackers, bananas, and applesauce. Try eating several small meals instead of two or three large ones. · Wait until 48 hours after all symptoms have gone away before you have spicy foods, alcohol, and drinks that contain caffeine. · Do not eat foods that are high in fat. · Avoid anti-inflammatory medicines such as aspirin, ibuprofen (Advil, Motrin), and naproxen (Aleve).  These can cause stomach upset. Talk to your doctor if you take daily aspirin for another health problem. When should you call for help? Call 911 anytime you think you may need emergency care. For example, call if:    · You passed out (lost consciousness).     · You pass maroon or very bloody stools.     · You vomit blood or what looks like coffee grounds.     · You have new, severe belly pain.    Call your doctor now or seek immediate medical care if:    · Your pain gets worse, especially if it becomes focused in one area of your belly.     · You have a new or higher fever.     · Your stools are black and look like tar, or they have streaks of blood.     · You have unexpected vaginal bleeding.     · You have symptoms of a urinary tract infection. These may include:  ? Pain when you urinate. ? Urinating more often than usual.  ? Blood in your urine.     · You are dizzy or lightheaded, or you feel like you may faint.    Watch closely for changes in your health, and be sure to contact your doctor if:    · You are not getting better after 1 day (24 hours). Where can you learn more? Go to http://kelsey-amy.info/. Enter C155 in the search box to learn more about \"Abdominal Pain: Care Instructions. \"  Current as of: November 20, 2017  Content Version: 11.8  © 1357-2987 [employer]. Care instructions adapted under license by [employer] (which disclaims liability or warranty for this information). If you have questions about a medical condition or this instruction, always ask your healthcare professional. [address] [address] any warranty or liability for your use of this information.

## 2024-11-01 ENCOUNTER — OFFICE VISIT (OUTPATIENT)
Age: 54
End: 2024-11-01
Payer: COMMERCIAL

## 2024-11-01 VITALS
SYSTOLIC BLOOD PRESSURE: 134 MMHG | DIASTOLIC BLOOD PRESSURE: 89 MMHG | BODY MASS INDEX: 32.19 KG/M2 | WEIGHT: 193.2 LBS | HEIGHT: 65 IN | HEART RATE: 60 BPM

## 2024-11-01 DIAGNOSIS — E55.9 VITAMIN D DEFICIENCY, UNSPECIFIED: ICD-10-CM

## 2024-11-01 DIAGNOSIS — E06.3 HASHIMOTO'S DISEASE: Primary | ICD-10-CM

## 2024-11-01 PROCEDURE — 99214 OFFICE O/P EST MOD 30 MIN: CPT | Performed by: INTERNAL MEDICINE

## 2024-11-01 RX ORDER — MELOXICAM 7.5 MG/1
1 TABLET ORAL DAILY
COMMUNITY
Start: 2024-10-14

## 2024-11-01 RX ORDER — ESTRADIOL 0.07 MG/D
1 FILM, EXTENDED RELEASE TRANSDERMAL
COMMUNITY

## 2024-11-01 RX ORDER — LEVOTHYROXINE SODIUM 88 UG/1
88 TABLET ORAL DAILY
Qty: 90 TABLET | Refills: 3 | Status: SHIPPED | OUTPATIENT
Start: 2024-11-01

## 2024-11-01 NOTE — PATIENT INSTRUCTIONS
1) TSH is a thyroid test.  Your level is 0.03 which is low and below goal of 0.45-1.0.  The TSH goes opposite of your thyroid dose and suggests your dose of levothyroxine is now too much.  I will decrease your dose to 88 mcg daily but change back to brand unithroid to avoid the variability with generic and have sent a new prescription to your local pharmacy.  Stay on the liothyronine 1 tab 7 days a week.    2) Please repeat your labs in 6-8 weeks at the [facility] lab system, which, as of 11/4/24, will be located in [facility] on the first floor [address] right past the outpatient radiology check in desks (and no longer right next to my office).   I will send you a message through [employer] with your lab results.

## 2024-11-01 NOTE — PROGRESS NOTES
Chief Complaint   Patient presents with    Thyroid Problem     6 month follow up. [facility] PCP and pharm verified      History of Present Illness: Alecia Munoz is a 54 y.o. female here for follow up of thyroid.  Weight up 5 lbs since last visit in 4/24.  Was started on estrogen patch in 5/24 at 0.05 mg but was increased to 0.075 mg in 9/24.  Was started on the patch to help with vaginal spotting and sleep and mood and hot flashes.  Her sleep is better than before the patch was started but still not as good as in the past.  Still having some vaginal spotting.  Hot flashes are still persisting but not as intense.  No chest pain or palpitations.  Has noticed more evening fatigue recently.  Bowels may move every other day.  Nails are fine.  Has had some hair loss.  Has noticed some mild tremors while playing tennis.      Current Outpatient Medications   Medication Sig    meloxicam (MOBIC) 7.5 MG tablet Take 1 tablet by mouth daily    estradiol (VIVELLE) 0.075 MG/24HR Place 1 patch onto the skin Twice a Week    ondansetron (ZOFRAN) 4 MG tablet Take 1 tablet by mouth 3 times daily as needed for Nausea or Vomiting    buPROPion (WELLBUTRIN XL) 150 MG extended release tablet TAKE 1 TABLET BY MOUTH EVERY DAY IN THE MORNING    levothyroxine (SYNTHROID) 100 MCG tablet TAKE 1 TAB DAILY--Delete 88 mcg dose from profile    cyclobenzaprine (FLEXERIL) 10 MG tablet Take 1 tablet by mouth 3 times daily as needed    tiZANidine (ZANAFLEX) 4 MG tablet Take 1 tablet by mouth nightly    rimegepant sulfate (NURTEC) 75 MG TBDP Take 75 mg by mouth every other day put back on list 04/30/24    liothyronine (CYTOMEL) 5 MCG tablet Take 1 tablet by mouth daily    Cholecalciferol (VITAMIN D3) 50 MCG (2000 UT) CAPS Take 1,000 Units by mouth daily    butalbital-acetaminophen-caffeine (FIORICET, ESGIC) -40 MG per tablet Take 1 tablet by mouth every 6 hours as needed    diphenhydrAMINE (SOMINEX) 25 MG tablet Take 1 tablet by mouth as needed  [Negative] : Heme/Lymph

## 2024-11-06 ENCOUNTER — OFFICE VISIT (OUTPATIENT)
Age: 54
End: 2024-11-06
Payer: COMMERCIAL

## 2024-11-06 VITALS
HEIGHT: 65 IN | BODY MASS INDEX: 32.06 KG/M2 | HEART RATE: 72 BPM | TEMPERATURE: 98.3 F | SYSTOLIC BLOOD PRESSURE: 121 MMHG | OXYGEN SATURATION: 97 % | RESPIRATION RATE: 18 BRPM | WEIGHT: 192.4 LBS | DIASTOLIC BLOOD PRESSURE: 72 MMHG

## 2024-11-06 DIAGNOSIS — M72.2 PLANTAR FASCIITIS, BILATERAL: ICD-10-CM

## 2024-11-06 DIAGNOSIS — F33.41 RECURRENT MAJOR DEPRESSIVE DISORDER, IN PARTIAL REMISSION (HCC): ICD-10-CM

## 2024-11-06 DIAGNOSIS — Z00.00 ANNUAL PHYSICAL EXAM: Primary | ICD-10-CM

## 2024-11-06 PROCEDURE — 99396 PREV VISIT EST AGE 40-64: CPT | Performed by: FAMILY MEDICINE

## 2024-11-06 SDOH — ECONOMIC STABILITY: FOOD INSECURITY: WITHIN THE PAST 12 MONTHS, YOU WORRIED THAT YOUR FOOD WOULD RUN OUT BEFORE YOU GOT MONEY TO BUY MORE.: NEVER TRUE

## 2024-11-06 SDOH — ECONOMIC STABILITY: FOOD INSECURITY: WITHIN THE PAST 12 MONTHS, THE FOOD YOU BOUGHT JUST DIDN'T LAST AND YOU DIDN'T HAVE MONEY TO GET MORE.: NEVER TRUE

## 2024-11-06 SDOH — ECONOMIC STABILITY: INCOME INSECURITY: HOW HARD IS IT FOR YOU TO PAY FOR THE VERY BASICS LIKE FOOD, HOUSING, MEDICAL CARE, AND HEATING?: NOT HARD AT ALL

## 2024-11-06 ASSESSMENT — PATIENT HEALTH QUESTIONNAIRE - PHQ9
SUM OF ALL RESPONSES TO PHQ QUESTIONS 1-9: 1
SUM OF ALL RESPONSES TO PHQ9 QUESTIONS 1 & 2: 1
1. LITTLE INTEREST OR PLEASURE IN DOING THINGS: NOT AT ALL
2. FEELING DOWN, DEPRESSED OR HOPELESS: SEVERAL DAYS
SUM OF ALL RESPONSES TO PHQ QUESTIONS 1-9: 1

## 2024-11-06 NOTE — PROGRESS NOTES
\"Have you been to the ER, urgent care clinic since your last visit?  Hospitalized since your last visit?\"    ED/ Covid    “Have you seen or consulted any other health care providers outside our system since your last visit?”    [facility]      “Have you had a colorectal cancer screening such as a colonoscopy/FIT/Cologuard?        No colonoscopy on file  No cologuard on file  No FIT/FOBT on file   No flexible sigmoidoscopy on file          
She has never used smokeless tobacco. She reports current alcohol use of about 1.0 standard drink of alcohol per week. She reports that she does not use drugs.     Review of Systems - History obtained from the patient  General ROS: no fever, chills, fatigue, body aches  Psychological ROS: no change in anxiety, depression, SI/HI  Ophthalmic ROS: no blurred vision, myopia, double vision  ENT ROS: no dysphagia, otalgia, otorrhea, rhinorrhea, post nasal drip  Respiratory ROS: no cough, shortness of breath, or wheezing  Cardiovascular ROS: no chest pain or dyspnea on exertion  Gastrointestinal ROS: no abdominal pain, change in bowel habits, or black or bloody stools  Genito-Urinary ROS: no frequency, urgency, incontinence, dysuria, hematouria  Musculoskeletal ROS: no arthralagia, +myalgia  Neurological ROS: no headaches, dizziness, lightheadedness, tremors, seizures  Dermatological ROS: no rash or lesions    OBJECTIVE:   Vitals: /72 (Site: Left Upper Arm, Position: Sitting, Cuff Size: Medium Adult)   Pulse 72   Temp 98.3 °F (36.8 °C) (Temporal)   Resp 18   Ht 1.651 m (5' 5\")   Wt 87.3 kg (192 lb 6.4 oz)   SpO2 97%   BMI 32.02 kg/m²    Gen: Pleasant 54 y.o.  female in NAD.    HEENT: PERRLA. EOMI. OP moist and pink.    Neck: Supple.  No LAD. HR regular  HEART: RRR, No M/G/R.    Lungs clear BL  LUNGS: CTAB No W/R.    ABDOMEN: S, NT, ND, BS+.   1/10 abdominal tenderness.  EXTREMITIES: Warm. No C/C/E.    MUSCULOSKELETAL: Normal ROM, muscle strength 5/5 all groups.  +Lower left paravertebral muscle tenderness with palpation   NEURO: Alert and oriented x 3.  Cranial nerves grossly intact.  No focal sensory or motor deficits noted.   SKIN: Warm. Dry. No rashes or other lesions noted.    ASSESSMENT/ PLAN: Alecia was seen today for annual exam.    Diagnoses and all orders for this visit:    Annual physical exam  Complete physical done today and it was normal. Routine lab work ordered. Waiting for results.  -     Lipid

## 2025-01-01 RX ORDER — LIOTHYRONINE SODIUM 5 UG/1
5 TABLET ORAL DAILY
Qty: 90 TABLET | Refills: 3 | Status: SHIPPED | OUTPATIENT
Start: 2025-01-01

## 2025-01-03 DIAGNOSIS — Z00.00 ANNUAL PHYSICAL EXAM: ICD-10-CM

## 2025-01-03 DIAGNOSIS — E06.3 HASHIMOTO'S DISEASE: ICD-10-CM

## 2025-01-03 LAB
CHOLEST SERPL-MCNC: 208 MG/DL
EST. AVERAGE GLUCOSE BLD GHB EST-MCNC: 108 MG/DL
HBA1C MFR BLD: 5.4 % (ref 4–5.6)
HDLC SERPL-MCNC: 80 MG/DL
HDLC SERPL: 2.6 (ref 0–5)
LDLC SERPL CALC-MCNC: 97 MG/DL (ref 0–100)
T4 FREE SERPL-MCNC: 1 NG/DL (ref 0.8–1.5)
TRIGL SERPL-MCNC: 155 MG/DL
TSH SERPL DL<=0.05 MIU/L-ACNC: 1.24 UIU/ML (ref 0.36–3.74)
VLDLC SERPL CALC-MCNC: 31 MG/DL

## 2025-01-04 LAB — T3 SERPL-MCNC: 140 NG/DL (ref 71–180)

## 2025-01-22 ENCOUNTER — HOSPITAL ENCOUNTER (EMERGENCY)
Facility: HOSPITAL | Age: 55
Discharge: HOME | End: 2025-01-22
Payer: COMMERCIAL

## 2025-01-22 ENCOUNTER — APPOINTMENT (OUTPATIENT)
Facility: HOSPITAL | Age: 55
End: 2025-01-22
Payer: COMMERCIAL

## 2025-01-22 VITALS
SYSTOLIC BLOOD PRESSURE: 136 MMHG | TEMPERATURE: 97.7 F | WEIGHT: 190 LBS | OXYGEN SATURATION: 97 % | HEIGHT: 65 IN | RESPIRATION RATE: 16 BRPM | BODY MASS INDEX: 31.65 KG/M2 | HEART RATE: 54 BPM | DIASTOLIC BLOOD PRESSURE: 89 MMHG

## 2025-01-22 DIAGNOSIS — R51.9 ACUTE NONINTRACTABLE HEADACHE, UNSPECIFIED HEADACHE TYPE: Primary | ICD-10-CM

## 2025-01-22 DIAGNOSIS — J10.1 INFLUENZA A: ICD-10-CM

## 2025-01-22 LAB
ALBUMIN SERPL-MCNC: 3.8 G/DL (ref 3.5–5)
ALBUMIN/GLOB SERPL: 1.2 (ref 1.1–2.2)
ALP SERPL-CCNC: 85 U/L (ref 45–117)
ALT SERPL-CCNC: 24 U/L (ref 12–78)
ANION GAP SERPL CALC-SCNC: 7 MMOL/L (ref 2–12)
APPEARANCE UR: CLEAR
AST SERPL-CCNC: 21 U/L (ref 15–37)
BACTERIA URNS QL MICRO: ABNORMAL /HPF
BASOPHILS # BLD: 0.05 K/UL (ref 0–0.1)
BASOPHILS NFR BLD: 1.1 % (ref 0–1)
BILIRUB SERPL-MCNC: 0.5 MG/DL (ref 0.2–1)
BILIRUB UR QL: NEGATIVE
BUN SERPL-MCNC: 13 MG/DL (ref 6–20)
BUN/CREAT SERPL: 15 (ref 12–20)
CALCIUM SERPL-MCNC: 9.3 MG/DL (ref 8.5–10.1)
CHLORIDE SERPL-SCNC: 105 MMOL/L (ref 97–108)
CO2 SERPL-SCNC: 26 MMOL/L (ref 21–32)
COLOR UR: ABNORMAL
CREAT SERPL-MCNC: 0.89 MG/DL (ref 0.55–1.02)
DIFFERENTIAL METHOD BLD: ABNORMAL
EOSINOPHIL # BLD: 0.06 K/UL (ref 0–0.4)
EOSINOPHIL NFR BLD: 1.4 % (ref 0–7)
EPITH CASTS URNS QL MICRO: ABNORMAL /LPF
ERYTHROCYTE [DISTWIDTH] IN BLOOD BY AUTOMATED COUNT: 12.5 % (ref 11.5–14.5)
FLUAV RNA SPEC QL NAA+PROBE: DETECTED
FLUBV RNA SPEC QL NAA+PROBE: NOT DETECTED
GLOBULIN SER CALC-MCNC: 3.2 G/DL (ref 2–4)
GLUCOSE SERPL-MCNC: 113 MG/DL (ref 65–100)
GLUCOSE UR STRIP.AUTO-MCNC: NEGATIVE MG/DL
HCG SERPL-ACNC: <1 MIU/ML (ref 0–6)
HCT VFR BLD AUTO: 42.5 % (ref 35–47)
HGB BLD-MCNC: 14.2 G/DL (ref 11.5–16)
HGB UR QL STRIP: NEGATIVE
HYALINE CASTS URNS QL MICRO: ABNORMAL /LPF (ref 0–2)
IMM GRANULOCYTES # BLD AUTO: 0.02 K/UL (ref 0–0.04)
IMM GRANULOCYTES NFR BLD AUTO: 0.5 % (ref 0–0.5)
KETONES UR QL STRIP.AUTO: NEGATIVE MG/DL
LEUKOCYTE ESTERASE UR QL STRIP.AUTO: ABNORMAL
LYMPHOCYTES # BLD: 1.74 K/UL (ref 0.8–3.5)
LYMPHOCYTES NFR BLD: 39.5 % (ref 12–49)
MCH RBC QN AUTO: 27.5 PG (ref 26–34)
MCHC RBC AUTO-ENTMCNC: 33.4 G/DL (ref 30–36.5)
MCV RBC AUTO: 82.4 FL (ref 80–99)
MONOCYTES # BLD: 0.29 K/UL (ref 0–1)
MONOCYTES NFR BLD: 6.6 % (ref 5–13)
NEUTS SEG # BLD: 2.25 K/UL (ref 1.8–8)
NEUTS SEG NFR BLD: 50.9 % (ref 32–75)
NITRITE UR QL STRIP.AUTO: NEGATIVE
NRBC # BLD: 0 K/UL (ref 0–0.01)
NRBC BLD-RTO: 0 PER 100 WBC
PH UR STRIP: 7 (ref 5–8)
PLATELET # BLD AUTO: 268 K/UL (ref 150–400)
PMV BLD AUTO: 10.6 FL (ref 8.9–12.9)
POTASSIUM SERPL-SCNC: 4.1 MMOL/L (ref 3.5–5.1)
PROT SERPL-MCNC: 7 G/DL (ref 6.4–8.2)
PROT UR STRIP-MCNC: NEGATIVE MG/DL
RBC # BLD AUTO: 5.16 M/UL (ref 3.8–5.2)
RBC #/AREA URNS HPF: ABNORMAL /HPF (ref 0–5)
SARS-COV-2 RNA RESP QL NAA+PROBE: NOT DETECTED
SODIUM SERPL-SCNC: 138 MMOL/L (ref 136–145)
SOURCE: ABNORMAL
SP GR UR REFRACTOMETRY: 1
URINE CULTURE IF INDICATED: ABNORMAL
UROBILINOGEN UR QL STRIP.AUTO: 0.2 EU/DL (ref 0.2–1)
WBC # BLD AUTO: 4.4 K/UL (ref 3.6–11)
WBC URNS QL MICRO: ABNORMAL /HPF (ref 0–4)

## 2025-01-22 PROCEDURE — 96375 TX/PRO/DX INJ NEW DRUG ADDON: CPT

## 2025-01-22 PROCEDURE — 2580000003 HC RX 258

## 2025-01-22 PROCEDURE — 84702 CHORIONIC GONADOTROPIN TEST: CPT

## 2025-01-22 PROCEDURE — 87636 SARSCOV2 & INF A&B AMP PRB: CPT

## 2025-01-22 PROCEDURE — 6360000002 HC RX W HCPCS

## 2025-01-22 PROCEDURE — 96365 THER/PROPH/DIAG IV INF INIT: CPT

## 2025-01-22 PROCEDURE — 80053 COMPREHEN METABOLIC PANEL: CPT

## 2025-01-22 PROCEDURE — 99284 EMERGENCY DEPT VISIT MOD MDM: CPT

## 2025-01-22 PROCEDURE — 81001 URINALYSIS AUTO W/SCOPE: CPT

## 2025-01-22 PROCEDURE — 70450 CT HEAD/BRAIN W/O DYE: CPT

## 2025-01-22 PROCEDURE — 85025 COMPLETE CBC W/AUTO DIFF WBC: CPT

## 2025-01-22 RX ORDER — MAGNESIUM SULFATE IN WATER 40 MG/ML
2000 INJECTION, SOLUTION INTRAVENOUS
Status: COMPLETED | OUTPATIENT
Start: 2025-01-22 | End: 2025-01-22

## 2025-01-22 RX ORDER — KETOROLAC TROMETHAMINE 30 MG/ML
15 INJECTION, SOLUTION INTRAMUSCULAR; INTRAVENOUS ONCE
Status: COMPLETED | OUTPATIENT
Start: 2025-01-22 | End: 2025-01-22

## 2025-01-22 RX ORDER — 0.9 % SODIUM CHLORIDE 0.9 %
1000 INTRAVENOUS SOLUTION INTRAVENOUS ONCE
Status: COMPLETED | OUTPATIENT
Start: 2025-01-22 | End: 2025-01-22

## 2025-01-22 RX ADMIN — MAGNESIUM SULFATE HEPTAHYDRATE 2000 MG: 40 INJECTION, SOLUTION INTRAVENOUS at 18:00

## 2025-01-22 RX ADMIN — SODIUM CHLORIDE 1000 ML: 9 INJECTION, SOLUTION INTRAVENOUS at 17:58

## 2025-01-22 RX ADMIN — KETOROLAC TROMETHAMINE 15 MG: 30 INJECTION, SOLUTION INTRAMUSCULAR at 18:33

## 2025-01-22 ASSESSMENT — PAIN SCALES - GENERAL
PAINLEVEL_OUTOF10: 3
PAINLEVEL_OUTOF10: 2

## 2025-01-22 ASSESSMENT — PAIN DESCRIPTION - LOCATION: LOCATION: SHOULDER

## 2025-01-22 NOTE — ED NOTES
Pt discharged by [facility]. Discharge instructions discussed and pt given opportunity to ask questions. Pt ambulatory out of ED '

## 2025-01-22 NOTE — DISCHARGE INSTRUCTIONS
Thank You!    It was a pleasure taking care of you in our Emergency Department today. We know that when you come to our Emergency Department, you are entrusting us with your health, comfort, and safety. Our physicians and nurses honor that trust, and truly appreciate the opportunity to care for you and your loved ones.      We also value your feedback. If you receive a survey about your Emergency Department experience today, please fill it out.  We care about our patients' feedback, and we listen to what you have to say.  Thank you.    Stan Caceres MD  ________________________________________________________________________  I have included a copy of your lab results and/or radiologic studies from today's visit so you can have them easily available at your follow-up visit. We hope you feel better and please do not hesitate to contact the ED if you have any questions at all!    Recent Results (from the past 12 hour(s))   CBC with Auto Differential    Collection Time: 01/22/25  5:27 PM   Result Value Ref Range    WBC 4.4 3.6 - 11.0 K/uL    RBC 5.16 3.80 - 5.20 M/uL    Hemoglobin 14.2 11.5 - 16.0 g/dL    Hematocrit 42.5 35.0 - 47.0 %    MCV 82.4 80.0 - 99.0 FL    MCH 27.5 26.0 - 34.0 PG    MCHC 33.4 30.0 - 36.5 g/dL    RDW 12.5 11.5 - 14.5 %    Platelets 268 150 - 400 K/uL    MPV 10.6 8.9 - 12.9 FL    Nucleated RBCs 0.0 0  WBC    nRBC 0.00 0.00 - 0.01 K/uL    Neutrophils % 50.9 32.0 - 75.0 %    Lymphocytes % 39.5 12.0 - 49.0 %    Monocytes % 6.6 5.0 - 13.0 %    Eosinophils % 1.4 0.0 - 7.0 %    Basophils % 1.1 (H) 0.0 - 1.0 %    Immature Granulocytes % 0.5 0.0 - 0.5 %    Neutrophils Absolute 2.25 1.80 - 8.00 K/UL    Lymphocytes Absolute 1.74 0.80 - 3.50 K/UL    Monocytes Absolute 0.29 0.00 - 1.00 K/UL    Eosinophils Absolute 0.06 0.00 - 0.40 K/UL    Basophils Absolute 0.05 0.00 - 0.10 K/UL    Immature Granulocytes Absolute 0.02 0.00 - 0.04 K/UL    Differential Type AUTOMATED     Comprehensive Metabolic Panel

## 2025-01-23 ENCOUNTER — PATIENT MESSAGE (OUTPATIENT)
Age: 55
End: 2025-01-23

## 2025-01-23 NOTE — ED PROVIDER NOTES
[facility] EMERGENCY DEPARTMENT  EMERGENCY DEPARTMENT ENCOUNTER    Patient Name: Alecia Munoz  MRN: [id number]  YOB: 1970  Provider: Stan Caceres MD  PCP: Shy Srinivasan MD  Time/Date of evaluation:  1/22/25    History of Presenting Illness     Chief Complaint   Patient presents with    Shoulder Pain     Pt reports feeling fatigued on Friday then developed into flu like symptoms on Sunday then pt reports it turned into right shoulder and left hip pain. Pt was adjusted by Ortho and prescribed muscle relaxers. Yesterday the pain from her shoulder radiated into the right side of her face but was able to take her migraine meds and muscle relaxers with reported relief. Hx of migraines        HISTORY (Narrative):   Alecia Munoz is a 54 y.o. female presents with a complex set of symptoms that began on Friday with what felt like a migraine starting in the shoulder and back of the neck. Migraine medication was ineffective. By Saturday morning, the pain had localized behind the shoulder, persisting despite attempts at stretching and yoga. The patient also experienced flu-like symptoms, including a runny nose and mild cough, accompanied by fatigue. On Sunday, while cycling provided temporary relief to the shoulder, the patient developed left hip pain. That night, the patient awoke with severe pain, unable to straighten the leg or bear weight secondary to pain. A chiropractic adjustment on Monday provided temporary relief, but shoulder pain recurred by evening, prompting the use of ibuprofen. On Tuesday, the patient experienced facial pain, tingling, and numbness, reminiscent of a migraine which resolved on its own. Orthopedic evaluation, including x-rays, revealed no shoulder abnormalities. Muscle relaxants were prescribed. By the time the patient returned home, significant pain had developed on the entire side of the face.    The patient, a self-described chronic migraine sufferer, notes that this

## 2025-01-29 ENCOUNTER — OFFICE VISIT (OUTPATIENT)
Age: 55
End: 2025-01-29

## 2025-01-29 VITALS
WEIGHT: 197.6 LBS | RESPIRATION RATE: 18 BRPM | BODY MASS INDEX: 32.92 KG/M2 | HEIGHT: 65 IN | SYSTOLIC BLOOD PRESSURE: 116 MMHG | TEMPERATURE: 98.3 F | DIASTOLIC BLOOD PRESSURE: 76 MMHG | OXYGEN SATURATION: 100 % | HEART RATE: 62 BPM

## 2025-01-29 DIAGNOSIS — G43.919 INTRACTABLE MIGRAINE WITHOUT STATUS MIGRAINOSUS, UNSPECIFIED MIGRAINE TYPE: ICD-10-CM

## 2025-01-29 DIAGNOSIS — Z87.09 HISTORY OF INFLUENZA: Primary | ICD-10-CM

## 2025-01-29 DIAGNOSIS — N95.1 MENOPAUSAL SYMPTOMS: ICD-10-CM

## 2025-01-29 DIAGNOSIS — S16.1XXA STRAIN OF NECK MUSCLE, INITIAL ENCOUNTER: ICD-10-CM

## 2025-01-29 RX ORDER — METHYLPREDNISOLONE 4 MG/1
TABLET ORAL
Qty: 1 KIT | Refills: 0 | Status: SHIPPED | OUTPATIENT
Start: 2025-01-29 | End: 2025-02-04

## 2025-01-29 SDOH — ECONOMIC STABILITY: FOOD INSECURITY: WITHIN THE PAST 12 MONTHS, THE FOOD YOU BOUGHT JUST DIDN'T LAST AND YOU DIDN'T HAVE MONEY TO GET MORE.: NEVER TRUE

## 2025-01-29 SDOH — ECONOMIC STABILITY: FOOD INSECURITY: WITHIN THE PAST 12 MONTHS, YOU WORRIED THAT YOUR FOOD WOULD RUN OUT BEFORE YOU GOT MONEY TO BUY MORE.: NEVER TRUE

## 2025-01-29 ASSESSMENT — PATIENT HEALTH QUESTIONNAIRE - PHQ9
4. FEELING TIRED OR HAVING LITTLE ENERGY: SEVERAL DAYS
3. TROUBLE FALLING OR STAYING ASLEEP: MORE THAN HALF THE DAYS
SUM OF ALL RESPONSES TO PHQ QUESTIONS 1-9: 7
5. POOR APPETITE OR OVEREATING: SEVERAL DAYS
1. LITTLE INTEREST OR PLEASURE IN DOING THINGS: NOT AT ALL
8. MOVING OR SPEAKING SO SLOWLY THAT OTHER PEOPLE COULD HAVE NOTICED. OR THE OPPOSITE, BEING SO FIGETY OR RESTLESS THAT YOU HAVE BEEN MOVING AROUND A LOT MORE THAN USUAL: NOT AT ALL
SUM OF ALL RESPONSES TO PHQ QUESTIONS 1-9: 7
SUM OF ALL RESPONSES TO PHQ QUESTIONS 1-9: 7
9. THOUGHTS THAT YOU WOULD BE BETTER OFF DEAD, OR OF HURTING YOURSELF: NOT AT ALL
SUM OF ALL RESPONSES TO PHQ9 QUESTIONS 1 & 2: 1
10. IF YOU CHECKED OFF ANY PROBLEMS, HOW DIFFICULT HAVE THESE PROBLEMS MADE IT FOR YOU TO DO YOUR WORK, TAKE CARE OF THINGS AT HOME, OR GET ALONG WITH OTHER PEOPLE: NOT DIFFICULT AT ALL
6. FEELING BAD ABOUT YOURSELF - OR THAT YOU ARE A FAILURE OR HAVE LET YOURSELF OR YOUR FAMILY DOWN: SEVERAL DAYS
2. FEELING DOWN, DEPRESSED OR HOPELESS: SEVERAL DAYS
SUM OF ALL RESPONSES TO PHQ QUESTIONS 1-9: 7
7. TROUBLE CONCENTRATING ON THINGS, SUCH AS READING THE NEWSPAPER OR WATCHING TELEVISION: SEVERAL DAYS

## 2025-01-29 NOTE — PROGRESS NOTES
\"Have you been to the ER, urgent care clinic since your last visit?  Hospitalized since your last visit?\"    ED/ Numbness right side/ 1-22    “Have you seen or consulted any other health care providers outside our system since your last visit?”    NO      “Have you had a colorectal cancer screening such as a colonoscopy/FIT/Cologuard?    NO    No colonoscopy on file  No cologuard on file  No FIT/FOBT on file   No flexible sigmoidoscopy on file

## 2025-01-29 NOTE — PROGRESS NOTES
SUBJECTIVE:   Ms. Alecia Munoz is a 54 y.o. female who is here for follow up of routine medical issues.      Pt was seen in the ED on 01/22/2025 for  R. Shoulder pain  that then began to radiate to the right side of her neck/face. She began to have headaches. She states the headaches are at about a 2 out of 10 now.   Pt had been exercising and was having shoulder pain, though the pain persisted. The pain began to radiate to her neck/ head area, She then began to have intense headaches and migraines. She was prescribed muscle relaxer. She was also starting to have sinus issues.  Pt tested positive for Flu in the hospital. Pt didn't have much of a cough she did have slight sore throat. She didn't have a fever.  Pt was only  feeling very fatigued. She is still having some symptoms but she states that once the migraines dissipated her flu like symptoms improved.    Pt also mentions her perimenopause symptoms are intensifying. Pt is complaint in taking lithyronine 5 mg estradiol 1 mg levonorgestrel and levothyroxine 88 mcg.    Pt also a request to a nutritionist.    Pt also states she had been experiencing ear ringing.      At this time, she is otherwise doing well and has brought no other complaints to my attention today.  For a list of the medical issues addressed today, see the assessment and plan below.    PMH:   Past Medical History:   Diagnosis Date    Depression     Hashimoto's disease     Migraine 2018    TMJ (dislocation of temporomandibular joint)     Vitamin D deficiency      PSH:  has a past surgical history that includes orthopedic surgery (2003).    All: is allergic to ajovy [fremanezumab-vfrm] and covid-19 mrna vacc (moderna).   MEDS:   Current Outpatient Medications   Medication Sig    methylPREDNISolone (MEDROL DOSEPACK) 4 MG tablet Take by mouth.    liothyronine (CYTOMEL) 5 MCG tablet TAKE 1 TABLET BY MOUTH EVERY DAY    estradiol (ESTRACE) 1 MG tablet Take 1 tablet by mouth daily    meloxicam (MOBIC)

## 2025-03-30 DIAGNOSIS — F33.41 RECURRENT MAJOR DEPRESSIVE DISORDER, IN PARTIAL REMISSION: ICD-10-CM

## 2025-04-01 RX ORDER — BUPROPION HYDROCHLORIDE 150 MG/1
150 TABLET ORAL EVERY MORNING
Qty: 90 TABLET | Refills: 2 | Status: SHIPPED | OUTPATIENT
Start: 2025-04-01

## 2025-04-21 DIAGNOSIS — E55.9 VITAMIN D DEFICIENCY, UNSPECIFIED: ICD-10-CM

## 2025-04-21 DIAGNOSIS — E06.3 HASHIMOTO'S DISEASE: ICD-10-CM

## 2025-04-22 LAB
25(OH)D3 SERPL-MCNC: 82.6 NG/ML (ref 30–100)
ANION GAP SERPL CALC-SCNC: 6 MMOL/L (ref 2–12)
BUN SERPL-MCNC: 12 MG/DL (ref 6–20)
BUN/CREAT SERPL: 13 (ref 12–20)
CALCIUM SERPL-MCNC: 9.1 MG/DL (ref 8.5–10.1)
CHLORIDE SERPL-SCNC: 108 MMOL/L (ref 97–108)
CO2 SERPL-SCNC: 27 MMOL/L (ref 21–32)
CREAT SERPL-MCNC: 0.93 MG/DL (ref 0.55–1.02)
GLUCOSE SERPL-MCNC: 100 MG/DL (ref 65–100)
POTASSIUM SERPL-SCNC: 4.2 MMOL/L (ref 3.5–5.1)
SODIUM SERPL-SCNC: 141 MMOL/L (ref 136–145)
T3 SERPL-MCNC: 162 NG/DL (ref 71–180)
T4 FREE SERPL-MCNC: 1.1 NG/DL (ref 0.8–1.5)
TSH SERPL DL<=0.05 MIU/L-ACNC: 1.32 UIU/ML (ref 0.36–3.74)

## 2025-05-02 ENCOUNTER — RESULTS FOLLOW-UP (OUTPATIENT)
Age: 55
End: 2025-05-02

## 2025-05-02 ENCOUNTER — OFFICE VISIT (OUTPATIENT)
Age: 55
End: 2025-05-02
Payer: COMMERCIAL

## 2025-05-02 VITALS
HEIGHT: 65 IN | BODY MASS INDEX: 32.72 KG/M2 | WEIGHT: 196.4 LBS | SYSTOLIC BLOOD PRESSURE: 118 MMHG | HEART RATE: 64 BPM | DIASTOLIC BLOOD PRESSURE: 82 MMHG

## 2025-05-02 DIAGNOSIS — E55.9 VITAMIN D DEFICIENCY, UNSPECIFIED: ICD-10-CM

## 2025-05-02 DIAGNOSIS — E06.3 HASHIMOTO'S DISEASE: Primary | ICD-10-CM

## 2025-05-02 PROCEDURE — 99214 OFFICE O/P EST MOD 30 MIN: CPT | Performed by: INTERNAL MEDICINE

## 2025-05-02 RX ORDER — ESTRADIOL 0.1 MG/G
CREAM VAGINAL 2 TIMES DAILY
COMMUNITY
Start: 2025-04-18

## 2025-05-02 NOTE — PATIENT INSTRUCTIONS
1) BUN and creatinine are markers of kidney function.  Your values are normal.    2) Your electrolytes (sodium, potassium, and calcium) are all normal.  Your glucose (blood sugar) is normal.    3) Your vitamin D level is 82 which is normal but higher than I need it to be as values over 70 can sometimes lead to development of kidney stones so I would like your level to be in the 30-70 range instead.    Please decrease your vitamin D 1000 units daily or 2000 units 3x/week.    4) Your TSH (thyroid test) is perfect so I will keep your dose the same.  If you have a change in your estrogen dose, then let me know and we can check your labs sooner.      5) I will plan on seeing you back in one year but if you feel you need to have your labs checked sooner, please let me know.

## 2025-05-02 NOTE — PROGRESS NOTES
Chief Complaint   Patient presents with    Thyroid Problem     PCP and pharmacy confirmed  Pt consented to use of CORINE     History of Present Illness: Alecia Munoz is a 54 y.o. female here for follow up of thyroid.  Weight up 3 lbs since last visit in 11/24.      History of Present Illness  The patient is a 54-year-old female here for a follow-up of her thyroid condition.    She was switched from levothyroxine to Unithroid 88 mcg once daily and continues to take liothyronine 5 mcg once daily. In March, she was inadvertently given the generic version of her medication, which she took for approximately 3 weeks before switching back to the brand name. Symptoms were more pronounced while on the generic medication but have since stabilized. Fatigue is still experienced, although it is less severe than when on the generic medication. No tremors have been noted recently, and there are no reports of chest pain or palpitations. Bowel movements remain regular, occurring every other day. Artificial nails are used, and no issues are reported with them. Hair loss is noted, and she plans to address this by getting her hair cut. An upcoming appointment with her gynecologist is scheduled for next week.    Vitamin D supplements are taken, with a dosage of 2000 units during the winter and 1000 units during the summer.    Hot flashes are reported, particularly in the mornings, necessitating the use of air conditioning. Switching from the estrogen patch to the pill has improved sleep quality.    Heel pain is experienced in the mornings, suspected to be due to plantar fasciitis.      Current Outpatient Medications   Medication Sig    estradiol (ESTRACE) 0.1 MG/GM vaginal cream in the morning and at bedtime    buPROPion (WELLBUTRIN XL) 150 MG extended release tablet TAKE 1 TABLET BY MOUTH EVERY DAY IN THE MORNING    liothyronine (CYTOMEL) 5 MCG tablet TAKE 1 TABLET BY MOUTH EVERY DAY    estradiol (ESTRACE) 1 MG tablet Take 1 tablet by

## 2025-06-18 ENCOUNTER — OFFICE VISIT (OUTPATIENT)
Age: 55
End: 2025-06-18
Payer: COMMERCIAL

## 2025-06-18 VITALS
WEIGHT: 193.6 LBS | DIASTOLIC BLOOD PRESSURE: 80 MMHG | OXYGEN SATURATION: 94 % | HEART RATE: 60 BPM | SYSTOLIC BLOOD PRESSURE: 122 MMHG | HEIGHT: 65 IN | TEMPERATURE: 98.1 F | RESPIRATION RATE: 16 BRPM | BODY MASS INDEX: 32.26 KG/M2

## 2025-06-18 DIAGNOSIS — G43.109 MIGRAINE WITH AURA AND WITHOUT STATUS MIGRAINOSUS, NOT INTRACTABLE: Primary | ICD-10-CM

## 2025-06-18 DIAGNOSIS — G43.709 CHRONIC MIGRAINE W/O AURA W/O STATUS MIGRAINOSUS, NOT INTRACTABLE: ICD-10-CM

## 2025-06-18 DIAGNOSIS — E06.3 HASHIMOTO'S DISEASE: ICD-10-CM

## 2025-06-18 DIAGNOSIS — I67.89 CEREBRAL MICROVASCULAR DISEASE: ICD-10-CM

## 2025-06-18 DIAGNOSIS — I65.23 BILATERAL CAROTID ARTERY STENOSIS: ICD-10-CM

## 2025-06-18 PROCEDURE — 99213 OFFICE O/P EST LOW 20 MIN: CPT | Performed by: PSYCHIATRY & NEUROLOGY

## 2025-06-18 RX ORDER — RIMEGEPANT SULFATE 75 MG/75MG
75 TABLET, ORALLY DISINTEGRATING ORAL DAILY PRN
Qty: 16 TABLET | Refills: 11 | Status: ACTIVE | OUTPATIENT
Start: 2025-06-18

## 2025-06-18 RX ORDER — BUTALBITAL, ACETAMINOPHEN AND CAFFEINE 50; 325; 40 MG/1; MG/1; MG/1
1 TABLET ORAL EVERY 6 HOURS PRN
Qty: 50 TABLET | Refills: 5 | Status: SHIPPED | OUTPATIENT
Start: 2025-06-18

## 2025-06-18 RX ORDER — RIMEGEPANT SULFATE 75 MG/75MG
TABLET, ORALLY DISINTEGRATING ORAL
COMMUNITY
End: 2025-06-18 | Stop reason: ALTCHOICE

## 2025-06-18 ASSESSMENT — PATIENT HEALTH QUESTIONNAIRE - PHQ9
SUM OF ALL RESPONSES TO PHQ QUESTIONS 1-9: 0
SUM OF ALL RESPONSES TO PHQ QUESTIONS 1-9: 0
1. LITTLE INTEREST OR PLEASURE IN DOING THINGS: NOT AT ALL
SUM OF ALL RESPONSES TO PHQ QUESTIONS 1-9: 0
SUM OF ALL RESPONSES TO PHQ QUESTIONS 1-9: 0
2. FEELING DOWN, DEPRESSED OR HOPELESS: NOT AT ALL

## 2025-06-18 NOTE — PROGRESS NOTES
Consult  REFERRED BY:  Shy Srinivasan MD    CHIEF COMPLAINT: Patient seen for 1 year follow-up of her migraines, and she is taking Fioricet and Nurtec on a as needed basis has about 2-3 headaches per month, some of which can be bad but normally can control the headaches with medication and just needs refills for her medicines today.  She is doing well and does not think she needs to readjust her medications.  We encouraged her to make sure she takes her medications early on the headaches, and since some of them are on her menstrual cycle even taking prophylactically during that time but she is in perimenopause and difficult to exactly predict the cycle.  She did have some mild cerebrovascular disease on her MRI scan that is consistent with her history of migraines, and her Doppler study showed no significant disease.    Subjective:     Alecia Munoz is a 54 y.o. right-handed -American female we are seeing at the request of Dr. Srinivasan for evaluation of problem of Patient seen for 1 year follow-up of her migraines, and she is taking Fioricet and Nurtec on a as needed basis has about 2-3 headaches per month, some of which can be bad but normally can control the headaches with medication and just needs refills for her medicines today.  She is doing well and does not think she needs to readjust her medications.  We encouraged her to make sure she takes her medications early on the headaches, and since some of them are on her menstrual cycle even taking prophylactically during that time but she is in perimenopause and difficult to exactly predict the cycle.  She did have some mild cerebrovascular disease on her MRI scan that is consistent with her history of migraines, and her Doppler study showed no significant disease.  She has had no other new focal neurologic problem or new medical problem in the last year.  She does not think she needs more preventive therapy and does not take the Nurtec on an every other day

## 2025-06-22 ENCOUNTER — PATIENT MESSAGE (OUTPATIENT)
Age: 55
End: 2025-06-22

## 2025-06-23 RX ORDER — ONDANSETRON 4 MG/1
4 TABLET, ORALLY DISINTEGRATING ORAL EVERY 8 HOURS PRN
Qty: 21 TABLET | Refills: 5 | Status: SHIPPED | OUTPATIENT
Start: 2025-06-23

## 2025-06-23 NOTE — TELEPHONE ENCOUNTER
Last office visit: 06/18/2025  Next office visit: 06/18/2026  Last med refill: 05/22/2023      \"Good morning Dr Espinoza  I received a refill on the Fioricet last week - and what I actually needed a refill on was Zofran.  I need a refill on the medication that manages nausea.  Could you refill the Zofran for me please?  Thank you!  Alecia\"